# Patient Record
Sex: FEMALE | Race: WHITE | NOT HISPANIC OR LATINO | Employment: FULL TIME | URBAN - METROPOLITAN AREA
[De-identification: names, ages, dates, MRNs, and addresses within clinical notes are randomized per-mention and may not be internally consistent; named-entity substitution may affect disease eponyms.]

---

## 2017-05-31 ENCOUNTER — LAB (OUTPATIENT)
Dept: LAB | Facility: HOSPITAL | Age: 37
End: 2017-05-31

## 2017-05-31 ENCOUNTER — OFFICE VISIT (OUTPATIENT)
Dept: ONCOLOGY | Facility: CLINIC | Age: 37
End: 2017-05-31

## 2017-05-31 VITALS
BODY MASS INDEX: 19.15 KG/M2 | SYSTOLIC BLOOD PRESSURE: 106 MMHG | TEMPERATURE: 98.2 F | RESPIRATION RATE: 12 BRPM | HEIGHT: 64 IN | WEIGHT: 112.2 LBS | OXYGEN SATURATION: 100 % | HEART RATE: 96 BPM | DIASTOLIC BLOOD PRESSURE: 70 MMHG

## 2017-05-31 DIAGNOSIS — D68.61 ANTIPHOSPHOLIPID ANTIBODY SYNDROME (HCC): Primary | ICD-10-CM

## 2017-05-31 LAB
BASOPHILS # BLD AUTO: 0.03 10*3/MM3 (ref 0–0.1)
BASOPHILS NFR BLD AUTO: 0.5 % (ref 0–1.1)
DEPRECATED RDW RBC AUTO: 36.8 FL (ref 37–49)
EOSINOPHIL # BLD AUTO: 0.06 10*3/MM3 (ref 0–0.36)
EOSINOPHIL NFR BLD AUTO: 0.9 % (ref 1–5)
ERYTHROCYTE [DISTWIDTH] IN BLOOD BY AUTOMATED COUNT: 12.3 % (ref 11.7–14.5)
HCT VFR BLD AUTO: 40.4 % (ref 34–45)
HGB BLD-MCNC: 13.6 G/DL (ref 11.5–14.9)
IMM GRANULOCYTES # BLD: 0.02 10*3/MM3 (ref 0–0.03)
IMM GRANULOCYTES NFR BLD: 0.3 % (ref 0–0.5)
LYMPHOCYTES # BLD AUTO: 1.73 10*3/MM3 (ref 1–3.5)
LYMPHOCYTES NFR BLD AUTO: 26.7 % (ref 20–49)
MCH RBC QN AUTO: 27.5 PG (ref 27–33)
MCHC RBC AUTO-ENTMCNC: 33.7 G/DL (ref 32–35)
MCV RBC AUTO: 81.8 FL (ref 83–97)
MONOCYTES # BLD AUTO: 0.54 10*3/MM3 (ref 0.25–0.8)
MONOCYTES NFR BLD AUTO: 8.3 % (ref 4–12)
NEUTROPHILS # BLD AUTO: 4.09 10*3/MM3 (ref 1.5–7)
NEUTROPHILS NFR BLD AUTO: 63.3 % (ref 39–75)
NRBC BLD MANUAL-RTO: 0 /100 WBC (ref 0–0)
PLATELET # BLD AUTO: 68 10*3/MM3 (ref 150–375)
PLATELETS.RETICULATED NFR BLD AUTO: 7.3 % (ref 1.1–6.1)
PMV BLD AUTO: 10.8 FL (ref 8.9–12.1)
RBC # BLD AUTO: 4.94 10*6/MM3 (ref 3.9–5)
WBC NRBC COR # BLD: 6.47 10*3/MM3 (ref 4–10)

## 2017-05-31 PROCEDURE — 85055 RETICULATED PLATELET ASSAY: CPT

## 2017-05-31 PROCEDURE — 99213 OFFICE O/P EST LOW 20 MIN: CPT | Performed by: INTERNAL MEDICINE

## 2017-05-31 PROCEDURE — 36416 COLLJ CAPILLARY BLOOD SPEC: CPT

## 2017-05-31 PROCEDURE — 85025 COMPLETE CBC W/AUTO DIFF WBC: CPT

## 2019-08-26 ENCOUNTER — TELEPHONE (OUTPATIENT)
Dept: ONCOLOGY | Facility: HOSPITAL | Age: 39
End: 2019-08-26

## 2019-08-26 NOTE — TELEPHONE ENCOUNTER
----- Message from Terri Collins sent at 8/26/2019  8:01 AM EDT -----  [phone]   needs an order sent to her local doctor to get her platelets checked      Pt has not been seen in over 2 years. Explained to pt that she would need to have her PCP order the CBC and if there was an area of concern, we would gladly see her again but she would need to be referred back. Pt v/u.

## 2022-02-18 ENCOUNTER — APPOINTMENT (OUTPATIENT)
Dept: WOMENS IMAGING | Facility: HOSPITAL | Age: 42
End: 2022-02-18

## 2022-02-18 PROCEDURE — 77063 BREAST TOMOSYNTHESIS BI: CPT | Performed by: RADIOLOGY

## 2022-02-18 PROCEDURE — 77067 SCR MAMMO BI INCL CAD: CPT | Performed by: RADIOLOGY

## 2022-05-02 ENCOUNTER — OFFICE VISIT (OUTPATIENT)
Dept: GYNECOLOGIC ONCOLOGY | Facility: CLINIC | Age: 42
End: 2022-05-02

## 2022-05-02 ENCOUNTER — PREP FOR SURGERY (OUTPATIENT)
Dept: OTHER | Facility: HOSPITAL | Age: 42
End: 2022-05-02

## 2022-05-02 VITALS
OXYGEN SATURATION: 99 % | TEMPERATURE: 98.1 F | RESPIRATION RATE: 16 BRPM | HEIGHT: 64 IN | WEIGHT: 114.3 LBS | BODY MASS INDEX: 19.52 KG/M2 | DIASTOLIC BLOOD PRESSURE: 77 MMHG | SYSTOLIC BLOOD PRESSURE: 117 MMHG | HEART RATE: 91 BPM

## 2022-05-02 DIAGNOSIS — N83.8 OVARIAN MASS: Primary | ICD-10-CM

## 2022-05-02 DIAGNOSIS — N83.209 CYST OF OVARY, UNSPECIFIED LATERALITY: Primary | ICD-10-CM

## 2022-05-02 PROCEDURE — 99204 OFFICE O/P NEW MOD 45 MIN: CPT | Performed by: OBSTETRICS & GYNECOLOGY

## 2022-05-02 RX ORDER — CETIRIZINE HYDROCHLORIDE 10 MG/1
TABLET ORAL DAILY
COMMUNITY

## 2022-05-02 RX ORDER — MULTIPLE VITAMINS W/ MINERALS TAB 9MG-400MCG
1 TAB ORAL DAILY
COMMUNITY

## 2022-05-02 RX ORDER — SODIUM CHLORIDE 0.9 % (FLUSH) 0.9 %
10 SYRINGE (ML) INJECTION AS NEEDED
Status: CANCELLED | OUTPATIENT
Start: 2022-07-01

## 2022-05-02 RX ORDER — SODIUM CHLORIDE, SODIUM LACTATE, POTASSIUM CHLORIDE, CALCIUM CHLORIDE 600; 310; 30; 20 MG/100ML; MG/100ML; MG/100ML; MG/100ML
100 INJECTION, SOLUTION INTRAVENOUS CONTINUOUS
Status: CANCELLED | OUTPATIENT
Start: 2022-07-01

## 2022-05-02 RX ORDER — SODIUM CHLORIDE 0.9 % (FLUSH) 0.9 %
10 SYRINGE (ML) INJECTION EVERY 12 HOURS SCHEDULED
Status: CANCELLED | OUTPATIENT
Start: 2022-07-01

## 2022-05-02 RX ORDER — ONDANSETRON 2 MG/ML
4 INJECTION INTRAMUSCULAR; INTRAVENOUS EVERY 6 HOURS PRN
Status: CANCELLED | OUTPATIENT
Start: 2022-05-02

## 2022-05-02 RX ORDER — CEFAZOLIN SODIUM 2 G/100ML
2 INJECTION, SOLUTION INTRAVENOUS ONCE
Status: CANCELLED | OUTPATIENT
Start: 2022-07-01 | End: 2022-05-02

## 2022-05-02 NOTE — PROGRESS NOTES
Age: 42 y.o.  Sex: female  :  1980  MRN: [id number]       REFERRING PHYSICIAN: No ref. provider found  DATE OF VISIT: 2022        Regi Glass presents to [facility] for evaluation and management of ovrian cyst. TVUS shows uterus 10cm, ET 8mm, Right ovary 1cm, left ovary 7.5cm. She has no bloating or pelvic pain. No family history of breast or ovarian cancer.   Pt with known h/o ITP, plts usually around 60k. Sees Dr Eastman. +SLE/antiphoscpholipid Ab      Oncology/Hematology History Overview Note   Regi Glass is a 42 y.o. female referred by Dr. Olga Mello ([facility]) for left ovarian cysts.    • 22: Pap smear-negative  • 22: TVUS-uterus-97 x 60 x 72 mm, ET-8.9 mm, right ovary-28 x 15 x 16 mm, left ovary-66 x 55 x 45 mm, cyst-31 x 17 x 29 mm, cyst-46 x 40 x 43 mm.  • 22: TVUS-uterus-107 x 62 x 62 mm, ET-8.8 mm, right ovary-20 x 15 x 17 mm, left ovary-75 x 70 x 52 mm, cyst-61 x 53 x 47 mm, cyst-30 x 30 x 24 mm.         Past Medical History:  Past Medical History:   Diagnosis Date   • Cytopenia    • Endometriosis    • Erythematous rash     ON ABDOMEN, BACK AND LOWER EXTREMITIES   • Gestational thrombocytopenia (HCC)    • H/O idiopathic thrombocytopenic purpura    • Hx of folliculitis     IN THE LEFT OUTER ASPECT OF LABIA   • ITP (idiopathic thrombocytopenic purpura)     INCLUDING PLATELET CLUMPING   • Joint pain    • Thrombocytopenia (HCC)        Past Surgical History:  Past Surgical History:   Procedure Laterality Date   • DIAGNOSTIC LAPAROSCOPY EXPLORATORY LAPAROTOMY      1.Status post exploratory laparoscopy diagnosed with endometriosis in 2006.   • INCISION AND DRAINAGE ABSCESS      FOLLICULITIS IN TE LEFT OUTER ASPECT OF LABIA        MEDICATIONS:    Current Outpatient Medications:   •  cetirizine (zyrTEC) 10 MG tablet, Take  by mouth Daily., Disp: , Rfl:   •  hydroxychloroquine (PLAQUENIL) 200 MG tablet, Take  by mouth daily., Disp: , Rfl:  "  •  multivitamin with minerals (MULTIVITAMIN ADULT PO), Take 1 tablet by mouth Daily., Disp: , Rfl:     ALLERGIES:  Allergies   Allergen Reactions   • Bactrim [Sulfamethoxazole-Trimethoprim]          ROS:  CONSTITUTIONAL:  Denies fever or chills.   NEUROLOGIC:  Denies headache, focal weakness or sensory changes.   EYES:  Denies change in visual acuity.  HEENT:  Denies nasal congestion or sore throat.   RESPIRATORY:  Denies cough or shortness of breath.   CARDIOVASCULAR:  Denies chest pain or edema.   GI:  Denies abdominal pain, nausea, vomiting, bloody stools or diarrhea.   :  Denies dysuria, leaking or incontinence.  MUSCULOSKELETAL:  Denies back pain or joint pain.   INTEGUMENT:  Denies rash.   ENDOCRINE:  Denies polyuria or polydipsia.   LYMPHATIC:  Denies swollen glands or lymphedema.   PSYCHIATRIC:  Denies depression or anxiety.      PHYSICAL EXAM:  Vitals:    05/02/22 1118   BP: 117/77   Pulse: 91   Resp: 16   Temp: 98.1 °F (36.7 °C)   TempSrc: Oral   SpO2: 99%   Weight: 51.8 kg (114 lb 4.8 oz)   Height: 162 cm (63.78\")  Comment: new patient height   PainSc: 0-No pain     Body mass index is 19.76 kg/m².  Current Status 5/2/2022   ECOG score 0     PHQ-9 Total Score:         GEN: alert and oriented x 3, normal affect, well nourished and hydrated.  CARDIO: regular rate and rhythm.  PULM: Lungs CTA b.l, no RRW   ABD: Soft, nontender, nondistended.   GYN: External genitalia normal, no lesions. Speculum with normal vagina, normal appearingcervix without lesions. Bimanual exam does not reveal any palpable lesions. +Left adnexal fullness  EXT: No petechiae, bruising, rash, candida. No CCE.       Result Review :  The pertinent labs, images, and/or pathology as noted in the oncology history were reviewed independently and discussed with the patient.   Aurora Craven DO   05/02/2022    [facility] [place] [facility]   WBC   Date Value Ref Range Status   05/31/2017 6.47 4.00 - 10.00 10*3/mm3 Final     RBC   Date Value Ref Range Status "   05/31/2017 4.94 3.90 - 5.00 10*6/mm3 Final     Hemoglobin   Date Value Ref Range Status   05/31/2017 13.6 11.5 - 14.9 g/dL Final     Hematocrit   Date Value Ref Range Status   05/31/2017 40.4 34.0 - 45.0 % Final     Platelets   Date Value Ref Range Status   05/31/2017 68 (L) 150 - 375 10*3/mm3 Final     No results found for:     [facility]:  No radiology results for the last 90 days.          ASSESSMENT :  • Left ovarian cyst 7.5cm   • Thrombocytopenia (ITP)  • SLE / antiphospholipid Ab          PLAN :  Perioperative mgmt: platelets available in pre op   Med onc clearance - improve platelet counts as much as possible before surgery     The case was reviewed with the patient in detail, taking into consideration symptoms, laboratory results, imaging, pathology and physical exam findings.  At this point in time, I am recommending Exam under anesthesia, Robotic assisted total laparoscopic hysterectomy, bilateral salpingectomy, unilateral oophorecotmy, possible staging, possible laparotomy.       Risks, benefits, alternatives and indications to the procedure were reviewed in detail.    Risks of surgery include bleeding/hemorrhage which may require transfusion and associated transfusion risk (transfusion reaction, HCV, TRALI ); Infection, which may require antibiotic therapy or abscess drainage; Damage to surrounding internal organs (bowel, bladder, or urinary tract) which may result in obstruction or fistula; Nerve, or vascular injury which may result in numbness, pain, swelling or loss of strength. Risk of possible incomplete resolution of symptoms or failure to cure.  She understands that it is possible that intraoperative findings may warrant further treatment.  There is a low, but real, risk of unanticipated return to the OR for a problem associated with the surgery and a remote possibility of death.      • All questions were addressed and answered to the patient's satisfaction. She indicates understanding  of these risks and desires to proceed. She wishes me to use my judgement to do the procedure that I feel is in her best interest. Surgical consents were signed.  •             Aurora Craven D.O  5/2/2022    Gynecologic Oncology   [address] [place] [address] [address] [place] [id number] [phone] office

## 2022-05-05 ENCOUNTER — PATIENT ROUNDING (BHMG ONLY) (OUTPATIENT)
Dept: GYNECOLOGIC ONCOLOGY | Facility: CLINIC | Age: 42
End: 2022-05-05

## 2022-05-05 DIAGNOSIS — D69.6 TEMPORARY LOW PLATELET COUNT: Primary | ICD-10-CM

## 2022-05-05 NOTE — PROGRESS NOTES
May 5, 2022    Hello, may I speak with Regi Glass?    My name is TATA HERNANDEZ     I am  with [facility]  [address] [address] [phone].    Before we get started may I verify your date of birth? 1980    I am calling to officially welcome you to our practice and ask about your recent visit. Is this a good time to talk? YES    Tell me about your visit with us. What things went well?  CHECK IN WAS GREAT. THEY WERE SO NICE. EVERYONE VERY WELCOMING. ALL MY QUESTIONS WERE ANSWERED. VERY INFORMATIVE. I REALLY LIKE THE PT EDUCATION BINDER.        We're always looking for ways to make our patients' experiences even better. Do you have recommendations on ways we may improve?  NO    Overall were you satisfied with your first visit to our practice? YES       I appreciate you taking the time to speak with me today. Is there anything else I can do for you?  NO      Thank you, and have a great day.

## 2022-05-31 ENCOUNTER — CONSULT (OUTPATIENT)
Dept: ONCOLOGY | Facility: CLINIC | Age: 42
End: 2022-05-31

## 2022-05-31 ENCOUNTER — LAB (OUTPATIENT)
Dept: OTHER | Facility: HOSPITAL | Age: 42
End: 2022-05-31

## 2022-05-31 VITALS
OXYGEN SATURATION: 99 % | SYSTOLIC BLOOD PRESSURE: 122 MMHG | BODY MASS INDEX: 19.72 KG/M2 | HEART RATE: 86 BPM | RESPIRATION RATE: 16 BRPM | HEIGHT: 64 IN | DIASTOLIC BLOOD PRESSURE: 84 MMHG | TEMPERATURE: 97.1 F | WEIGHT: 115.5 LBS

## 2022-05-31 DIAGNOSIS — D69.6 TEMPORARY LOW PLATELET COUNT: Primary | ICD-10-CM

## 2022-05-31 DIAGNOSIS — D68.61 ANTIPHOSPHOLIPID ANTIBODY SYNDROME: Primary | ICD-10-CM

## 2022-05-31 DIAGNOSIS — N83.8 OVARIAN MASS: ICD-10-CM

## 2022-05-31 LAB
BASOPHILS # BLD AUTO: 0.02 10*3/MM3 (ref 0–0.2)
BASOPHILS NFR BLD AUTO: 0.2 % (ref 0–1.5)
CANCER AG125 SERPL QL: 90.1 U/ML (ref 0–38.1)
DEPRECATED RDW RBC AUTO: 38.5 FL (ref 37–54)
EOSINOPHIL # BLD AUTO: 0.06 10*3/MM3 (ref 0–0.4)
EOSINOPHIL NFR BLD AUTO: 0.7 % (ref 0.3–6.2)
ERYTHROCYTE [DISTWIDTH] IN BLOOD BY AUTOMATED COUNT: 12.2 % (ref 12.3–15.4)
HCT VFR BLD AUTO: 43.2 % (ref 34–46.6)
HGB BLD-MCNC: 13.9 G/DL (ref 12–15.9)
IMM GRANULOCYTES # BLD AUTO: 0.03 10*3/MM3 (ref 0–0.05)
IMM GRANULOCYTES NFR BLD AUTO: 0.4 % (ref 0–0.5)
LYMPHOCYTES # BLD AUTO: 1.74 10*3/MM3 (ref 0.7–3.1)
LYMPHOCYTES NFR BLD AUTO: 21.3 % (ref 19.6–45.3)
MCH RBC QN AUTO: 27.7 PG (ref 26.6–33)
MCHC RBC AUTO-ENTMCNC: 32.2 G/DL (ref 31.5–35.7)
MCV RBC AUTO: 86.1 FL (ref 79–97)
MONOCYTES # BLD AUTO: 0.8 10*3/MM3 (ref 0.1–0.9)
MONOCYTES NFR BLD AUTO: 9.8 % (ref 5–12)
NEUTROPHILS NFR BLD AUTO: 5.5 10*3/MM3 (ref 1.7–7)
NEUTROPHILS NFR BLD AUTO: 67.6 % (ref 42.7–76)
NRBC BLD AUTO-RTO: 0 /100 WBC (ref 0–0.2)
PLATELET # BLD AUTO: 65 10*3/MM3 (ref 140–450)
PLATELET # BLD AUTO: 65 10*3/MM3 (ref 140–450)
PLATELETS.RETICULATED NFR BLD AUTO: 7.3 % (ref 0.9–6.5)
PMV BLD AUTO: 10.6 FL (ref 6–12)
RBC # BLD AUTO: 5.02 10*6/MM3 (ref 3.77–5.28)
WBC NRBC COR # BLD: 8.15 10*3/MM3 (ref 3.4–10.8)

## 2022-05-31 PROCEDURE — 99215 OFFICE O/P EST HI 40 MIN: CPT | Performed by: INTERNAL MEDICINE

## 2022-05-31 PROCEDURE — 85613 RUSSELL VIPER VENOM DILUTED: CPT | Performed by: INTERNAL MEDICINE

## 2022-05-31 PROCEDURE — 86147 CARDIOLIPIN ANTIBODY EA IG: CPT | Performed by: INTERNAL MEDICINE

## 2022-05-31 PROCEDURE — 86304 IMMUNOASSAY TUMOR CA 125: CPT | Performed by: INTERNAL MEDICINE

## 2022-05-31 PROCEDURE — 36415 COLL VENOUS BLD VENIPUNCTURE: CPT

## 2022-05-31 PROCEDURE — 85055 RETICULATED PLATELET ASSAY: CPT | Performed by: INTERNAL MEDICINE

## 2022-05-31 PROCEDURE — 85025 COMPLETE CBC W/AUTO DIFF WBC: CPT | Performed by: INTERNAL MEDICINE

## 2022-05-31 PROCEDURE — 85670 THROMBIN TIME PLASMA: CPT | Performed by: INTERNAL MEDICINE

## 2022-05-31 PROCEDURE — 85705 THROMBOPLASTIN INHIBITION: CPT | Performed by: INTERNAL MEDICINE

## 2022-05-31 PROCEDURE — 86146 BETA-2 GLYCOPROTEIN ANTIBODY: CPT | Performed by: INTERNAL MEDICINE

## 2022-05-31 PROCEDURE — 85732 THROMBOPLASTIN TIME PARTIAL: CPT | Performed by: INTERNAL MEDICINE

## 2022-05-31 RX ORDER — PREDNISONE 20 MG/1
20 TABLET ORAL
Qty: 60 TABLET | Refills: 1 | Status: SHIPPED | OUTPATIENT
Start: 2022-05-31 | End: 2022-07-02 | Stop reason: HOSPADM

## 2022-05-31 NOTE — PROGRESS NOTES
Subjective .  Discussed previous history, current status                                                                          REASONS FOR FOLLOWUP:    1. Idiopathic thrombocytopenic purpura.   2. Variable thrombocytopenia including platelet clumping. ?Gestational thrombocytopenia noted when reviewed 01/16/2012 and 02/14/2012.   3. Patient seen 01/16/2012 when 16 weeks pregnant.   4. Patient seen 05/01/2012 nearing term. Platelet count approximately 80,000.   5. Patient seen 01/24/2014, further thrombocytopenia, now associated with increasing joint pain-polyarticular joint garo n, rheumatologic assessment initiated, consult requested, and prednisone initiated 20 mg oral daily.   6. Patient status post rheumatologic assessment and results pending, platelet count responsive to oral steroids, reduced; steroid taper planned.   7. Patient seen on 02/21/2014, platelet count approximately 70,000 on 5 mg every other day per prednisone. Rheumatologic assessment ongoing. SLA(?). Mild increase in antiphospholipid antibody levels.   8. The patient seen on 06/13/2014, Plaquenil initiated for apparel sy stemic lupus erythematosus associated elevated antiphospholipid antibody, further thrombocytopenia noted in our office and to restart steroids over a 2 week period.   9. The patient seen 07/14/2014 - improvement per generalized rheumatologic symptoms from cyto penia persisting, Plaquenil continued. Recheck over the subsequent 2 months planned.   10. Patient seen 11/10/2014, platelet count approximately 50,000, pending stabilization of rheumatologic symptoms. Reassessment in 4 months planned.   11. Patient was seen on 03 /23/2015, platelet count between 40,000 and 50,000, again with stabilization of rheumatologic symptoms, reassessment in 6 months' time.   12. Patient seen 09/16/2015, platelet count of 92,000, improvement of rheumatologic symptoms, every 6 month assessments pl anned.   13. Patient seen June 01, 2016 stable,  platelet count 1 or 16,000 additional rheumatologic symptoms controlled?  Length of Imuran use  14. Patient reviewed , off Imuran for approximate 6 months, anticardiolipin antibodies, lupus anticoagulant, beta-2 glycoprotein antibodies rechecked  15. Patient reviewed May 31, 2017 clinically stable, platelet count acceptable, yearly follow-up planned  16. Patient reassessed 22 with bilateral ovarian cysts, plans for surgical intervention per gynecologic oncology, reassessment per antiphospholipid antibody syndrome, steroids initiated with prednisone 1 mg/kg        History of Present Illness          The patient is now a 42-year-old female with a history of ITP and variable thrombocytopenia also associated with platelet clumping and gestational thrombocytopenia. The patient was seen during her pregnancy 2012,  and again 2012 at approximately 27 weeks, platelet count 70,000-80,000. She was admitted to [facility] with a platelet count of 1000 along with red purpura and petechiae. She has a history of thrombocytopenia dating to  when she was pregnant with a platelet count in the 50,000 range. She was also treated throughout her pregnancy with Lovenox at prophylactic doses due to prior miscarriages. She tolerated this well with no bleeding complications, platelet count appar ently spontaneously improved to the 80,000 range, eventually was 95,000 at the time of .   Approximately 10 years prior to hospitalization here at [facility], she had an area of folliculitis in the left outer aspect of labia requi ring incision and drainage treated empirically with antibiotics with Bactrim and doxycycline which she received over 10 days.   On the morning of admission she noted development of diffuse erythematous rash on her abdomen and back as well as lower extremities and buccal mucosa. She presented to Dr. Lee in [place] and was found to  have a white count of 5000, hemoglobin 14.2 a n d platelet count of 1000. She was transferred to [facility] for admission and treatment for presumed ITP. Admitting studies include a CRP of .6, ANTONI 1:80 positive, , IPF 31.8, initial CBC with platelet count of 2000, H and H 13.9 , 40.7, WBC 4140. She was placed on Decadron orally and IVIG. Sedimentation rate was found to be 8 and on the 24th IPF was still 31.7. AFO screening was also positive incidentally and CMV antibody IgG was greater than 13.2, IgM .28, EBV antibodies were a lso considerably high, EBV antibody/nuclear antigen greater than 600 and EBV antibodies ECA IgG of 450 with an EBV antibody early IgG of 17. All of these are excessively high. The patient did fortunately respond however, by the 26th platelet count was u p to 84,000, IPF 6.7. It was elected at this point for the patient to be discharged home and followed back in the office with weekly counts and tapered steroids thereafter.   She had her counts done each week including 12/13/2010 at which time she was 201 ,000 and when seen on 12/20/2010, platelet count was 136,000. In reviewing her circumstances on that day, she went up to 30 mg of steroids and had her cut back very slowly on a weekly basis.   She returned on 01/17/2011, feeling well. Her IPF is also at normal levels today. Additional studies have been done to be certain that she does have additional hypercoagulable state as well including lupus anticoagulant including lipid antibody screening. She has had both Pneumovax and meningococcal vaccine but no Haemophilus.   The patient presented back to the office 02/14/2011 after tapering off steroids and has been off of them for 1 week. Fortunately she remained relatively stable with platelet count 116,000 and we elected to follow her on an every other we ek basis. Fortunately her subsequent counts have been stable. She has been able to complete her vaccinations as  necessary. Her subsequent testing here has shown platelets in the 205,000 range on 03/28/2011, 168,000 on 04/11/2011 and 148 on 04/18/2011. She continues to feel well otherwise and has agreed that she would have monthly checks for a period of time longer for at least a 6 month period from her hospitalization.   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 She may have had a viral illness as of late. Her counts checked 05/17/2011 with a platelet count of 198,000, 06/13/2011 of 138,000. As she returns it i s clear that her platelet count is actually lower into the near 90,000 range. We discussed this in part as possibly not necessarily related to ICP since her IPF is actually quite low and her smears do not show enlarged platelets.   As a result of the ab ove the patient was asked to have counts done closer to home and these were done at every two week intervals with a considerable variation seen. This includes on 08/03 of 146,000, 08/10 of 168,000, 08/17 of 113,000, 8/24 of 96,000 and today 106,000 here in the office. As a result of these findings in its variability thereof suggests that perhaps the patient has platelet clumping ongoing, and today we will test her for this as well.   The patient thereafter continued her usual lifestyle. She and her [facility] nd had made plans for her to try to become pregnant and she did quite quickly do so. Thereafter she now sees Dr. Vic Lock, high-risk obstetrics, and her platelet count checked there approximately every other week has been dropping. She is now seen back in our office and actually has a platelet count of 56,000 with an IPF of 7. A peripheral smear is currently pending.   As result of review 01/16/2012 the patient had multifactorial reasons for her thrombocytopenia including ITP, likely additional pl atelet clumping, and?  gestational thrombocytopenia. In any case, she was reasonably stable at that point and we elected to follow her counts every other week. She returns back today having done this. She was also placed on aspirin by her high-risk OB a n d she is having increasing gum bleeding likely as a result of several reasons now. Her most recent platelet counts including 84,000 on 2012 and 68,000 when seen today, 2012. IPF interestingly is 4.7. Her pregnancy has otherwise gone well wi th no additional issues thus far. She is approximately 20 weeks' gestation.   The patient was asked to be checked at her family physicians office. She also followed up with her high risk OB and has platelet count ranging from 60,000 to as much as 80,000. Today when seen her platelet count is 79,000, IPF of 6.5. I discussed with Dr. Tomi Lock her high risk OB and plans were to try to hold off steroids unless she is below 50,000 wherein she would also discontinue aspirin. When seen today the pregn leanne has progressed nicely without any complication thus far. It is not clear if her delivery date is to be scheduled, but one expects that it might well be.   The patient's case was discussed with Dr. Tomi Lock with plans for her to again hold off steroids until she is below 50,000 at which time she will discontinue aspirin.   When seen again on 2012, pregnancy has progressed nicely and she is to see Dr. Tomi Lock in approximately 2 weeks. It is likely that she will undergo .   The patient did proceed onto steroids just prior to her pregnancy and fortunately did quite well peripartum. The patient had not been seen since that time approximately a year when she is now seen back 2013. She had presented to her PCP' s office jus t recently with abdominal pain, slowly worsening without nausea, vomiting or change in bowel habits. She underwent CT scan of the abdomen, pelvis without abnormalities noted though her blood  count was somewhat suspicious with an elevated white count to 18 , 000 and platelet count down to 33,000. Normal hemoglobin and hematocrit 14.3 and 41.97. Patient rechecked two days later with platelet count of 43,000 and white count apparently normal? She now presents back to our practice for reassessment feeling much b tereza per her abdominal pain with a normal performance status, otherwise no change in bowel function.   The patient thereafter was asked to be seen back and now presents 01/24/2014. She has been noticing in the last several weeks to months, pain in multiple joints including right hip, left shoulder and the bilateral knees. She notes a general stiffness throughout multiple joints in the a.m. as well. This is a new finding for her and she is quite troubled by it. She was undergoing assessment for it recently with additional laboratory studies done including 10/10/2013 with a platelet count found to be reduced to 86,000.   Additional laboratory results done also in [place] in June 2013 include normal serum chemistries; platelet count now is 53,000, WBC 8700, he moglobin and hematocrit 14.6, and 43.6. ANTONI titer positive, RA rheumatoid factor of 7, ASO of 137, uric acid 3.7. The patient was thereafter referred back to our practice for her thrombocytopenia. She feels well today except for again a degree of joint d iscomfort in the distribution as described above.   The patient as a result of the above and suspicions for rheumatologic disease was asked to undergo a series of studies. This included an ANTONI comprehensive panel that revealed anti-double-stranded DNA elevated at 21. Additionally, her RA panel suggested e a rly rheumatoid arthritis with a positive anti-CCP. As the patient's studies with Dr. Gonzáles however were not yet completed at the time she is seen back in our office. The patient at this point had continued steroids though when seen back in the office 01/3 1 /2014 her platelet count was  199,000. She was asked to taper prednisone down to 10 mg daily and when seen 02/07/2014 her platelet count is 89,000. The patient feels well overall today when seen. The joint discomfort has improved remarkably on the steroi d doses.   Patient seen on 02/21/2014 platelet count approximately 70,000 on 5 mg every other day per prednisone. Rheumatologic assessment ongoing mild increase in antiphospholipid antibody level.   The patient, after last being seen, was asked to drop predn isone down to 5 mg daily and then 5 mg every other day. She is now seeing Dr. Minoo Gonzáles who is assessing her as well and we sent additional laboratory studies, demonstrating mild elevation in antiphospholipid antibodies as well as glycoprotein antibody . It was also noted that her TTP level was up to 63. Ms. Glass indicates that she is being considered for potentially lupus as an underlying diagnosis. Dr. Gonzáles would like to check her off steroids and thus we discussed tapering off further at this po int with platelet count is now reasonably acceptable.   The patient thereafter came off steroids and has been doing relatively well. As she now however, presents back it is recognized that her platelet count has been slowly dropping. This includes the 05/27 /2014 assessment with a platelet count of 30,000 with assessment on 06/11/2014 at 25,000 and today when seen on 06/13/2014 the platelet count is 20,000 with IPF 14.5. The patient states she is having increasing joint pain but has had no evidence of ecchym oses or petechial lesions and no additional gingival bleeding, epistaxis or vaginal bleeding. She in fact feels reasonably good except for the joint pain that she feels is manageable. She is, however, concerned about her platelet count and rightly so.   The patient thereafter was started on Plaquenil through Rheumatology and over the next several weeks, she had felt somewhat better. In fact when she is seen back today, 07/14/2014, she  no longer has joint discomfort. She does have thrombocytopenia, however, a t approximately the same levels as she did previously. She had been on steroids in the last several weeks as they were tapered off. This was revealed when her platelet count was 23,000 three weeks ago and is recognized today. Today, however, her platelet count is 28,000. She has had no additional bleeding issues however, since last seen.   The patient, after being reviewed in July 2014, was asked to followup with plans for her to remain on Plaquenil, initiate Imuran which began in August. She stayed on th e medication, fortunately tolerating it well and as she was seen back today, 11/17/2014, overall feels as if she is having fewer rheumatologic symptoms. Unfortunately, hematologically, she remains [id number] with her platelet count at close to 60, 000.   The patient thereafter has followed up with rheumatology on an every 2 month basis and now returned back here 4 months from previous on 03/23/2015. Her symptoms for her rheumatologic disorder remains under control though her Imuran has been moved to 150 mg a day as well as her Plaquenil, maintained at the same dose. She feels reasonably good overall today without any new symptoms, but concerned about her platelet count that was close to 40,000 when reviewed today. The patient has had no bleeding in g ums, epistaxis, GI or  tract.   The patient is seen back in followup and indicates that her rheumatologic symptoms remain under control with Imuran and Plaquenil. We find wonderfully enough that her platelet count is also improved to approximately 100,000 when seen in the office today as well.   Patient is now next reviewed June 01, 2016.  She is feeling well overall without additional rheumatologic symptoms.  She has been on Imuran for approximately 2 years and is a question of how long she'll need to stay on it.  She fortunately is hematologically stable when reviewed today platelet  count of 116,000, and otherwise normal CBC     The patient is now reviewed December 50,016.  As a result of running out of her Imuran not having it refilled by a rheumatologist she has remained off Imuran.  She, fortunately, has not had any additional symptoms including rash development and/or joint discomfort.  We discussed rechecking her anticardiolipin antibodies, beta-2 glycoprotein antibodies and lupus anticoagulant at this point since she has a further degree of thrombocytopenia also present today.    The patient's subsequent laboratory studies were otherwise negative and she is now seen back May 31, 2017.  She continues to feel well without any joint disturbance but she does note bilateral malar rash which she tends to cover with makeup.          The patient presented February 18, 2022 to [facility] for preventive examination at this point having fairly heavy periods-?  Ablation or hysterectomy.Subsequent CBC including H&H of 13.3 and 41.4 white count of 9200, MCV 82.8, MCH of 26.6 and platelet count of 68,000, normal automated differential. Subsequent pelvic ultrasound revealed homogenous appearance to endometrium per uterus, endometrial borders well-defined, no intracavitary mass, normal endometrial thickness, normal cervix, right ovary normal, left ovary of 2 cysts 1 at just over 60 mm and possible endometrioma and 1 at just over 30 mm also possible endometrioma.  Unfortunately by late April 2022 her degree of menorrhagia was worsening and she was referred to Dr. Craven who felt she should undergo a laparoscopic hysterectomy, bilateral salpingo-oophorectomy unilateral oophorectomy and possible staging possible laparotomy.  She is now referred back to try to improve her platelet count prior to any surgical procedure.     The patient is seen 5/31/2022 indicating that she has been routinely assessed by rheumatology at every 6 months while she is remained on Plaquenil undergoing laboratory studies and  ophthalmologic assessments every 6 months.  She has had no issues until recently indicating her platelet count has been between 65 and 80,000.  She understands the above issues and the need to try to bring her platelet count up prior to surgical intervention by Dr. Craven.  She has had no symptoms, however, of progressive rheumatologic disease including lack of Raynaud's, further malar rash, joint discomfort, shortness of breath, cough, abdominal pain, change in bowel habit, joint swelling or neuropathy.  Additionally she has had no pelvic discomfort.        Past Medical History:   Diagnosis Date   • Cytopenia    • Endometriosis 2006   • Erythematous rash     ON ABDOMEN, BACK AND LOWER EXTREMITIES   • Gestational thrombocytopenia (HCC)    • H/O idiopathic thrombocytopenic purpura    • Hx of folliculitis     IN THE LEFT OUTER ASPECT OF LABIA   • ITP (idiopathic thrombocytopenic purpura)     INCLUDING PLATELET CLUMPING   • Joint pain    • Thrombocytopenia (HCC)        ONCOLOGIC HISTORY:  (History from previous dates can be found in the separate document.)    Current Outpatient Medications on File Prior to Visit   Medication Sig Dispense Refill   • cetirizine (zyrTEC) 10 MG tablet Take  by mouth Daily.     • hydroxychloroquine (PLAQUENIL) 200 MG tablet Take  by mouth daily.     • multivitamin with minerals tablet tablet Take 1 tablet by mouth Daily.       No current facility-administered medications on file prior to visit.       ALLERGIES:     Allergies   Allergen Reactions   • Bactrim [Sulfamethoxazole-Trimethoprim]        Social History     Socioeconomic History   • Marital status:    Tobacco Use   • Smoking status: Never Smoker   Substance and Sexual Activity   • Alcohol use: No   • Drug use: No         Cancer-related family history is negative for Cancer.     Review of Systems  A comprehensive 14 point review of systems was performed and was negative except as mentioned.    Objective      Vitals:     "05/31/22 0951   BP: 122/84   Pulse: 86   Resp: 16   Temp: 97.1 °F (36.2 °C)   TempSrc: Temporal   SpO2: 99%   Weight: 52.4 kg (115 lb 8 oz)   Height: 162 cm (63.78\")   PainSc: 0-No pain     Current Status 5/2/2022   ECOG score 0       Physical Exam    GENERAL: Well-developed, well-nourished female in no acute distress.   SKIN: Warm, dry without rashes, purpura or petechiae. Patient has a macular, erythematous rash noted ridge nasally   HEAD: Normocephalic.   EYES: Pupils equal, round and reactive to light. EOMs intact. Conjunctivae normal.   EARS: Hearing intact.   NOSE: Septum midline. No excoriations or nasal discharge.   MOUTH: Tongue is well-papillated; no stomatitis or ulcers. Lips normal.   THROAT: Oropharynx without lesions or exudates.   NECK: Supple with good range of motion; no thyromegaly or masses, no JVD or bruits.   LYMPHATICS: No cervical, supraclavicular, axillary or inguinal adenopathy.   CHEST: Lungs clear to percussion and auscultation.   CARDIAC: Regular rate and rhythm without murmurs, rubs or gallops.   ABDOMEN: Soft, nontender with no organomegaly or masses.   EXTREMITIES: No clubbing, cyanosis or edema.   NEUROLOGICAL: No focal neurological deficits.   RECENT LABS:  Hematology WBC   Date Value Ref Range Status   05/31/2022 8.15 3.40 - 10.80 10*3/mm3 Final     RBC   Date Value Ref Range Status   05/31/2022 5.02 3.77 - 5.28 10*6/mm3 Final     Hemoglobin   Date Value Ref Range Status   05/31/2022 13.9 12.0 - 15.9 g/dL Final     Hematocrit   Date Value Ref Range Status   05/31/2022 43.2 34.0 - 46.6 % Final     Platelets   Date Value Ref Range Status   05/31/2022 65 (L) 140 - 450 10*3/mm3 Final   05/31/2022 65 (L) 140 - 450 10*3/mm3 Final        Assessment & Plan        42-year-old female with a history of idiopathic thrombocytopenic purpura during previous pregnancy. In addition, she has had a degree of pseudothrombocytopenia noted on periodic blood smear and likely a component of gestational " thromboc y topenia previously. She had more recently a relapse of ITP, improved on steroid dosing. Her symptoms suggested she be tested further for rheumatologic disorder. She was seen by Dr. Gonzáles who diagnosed systemic lupus erythematosus, which evidently includes a component of antiphospholipid antibody syndrome as well. The patient was placed on Plaquenil and later Imuran, to which she continues to respond. Her platelet count had been noted to improved somewhat when she was seen in June of this year.  Now which is reviewed in December she continues to do well clinically though her platelet count has dropped modestly with a very minimally elevated IPF.  It was suggested rechecking her anticardiolipin antibodies, beta-2 glycoprotein antibodies and lupus anticoagulant today while she remained off Imuran.  These studies were negative.    She is now seen May 31 again doing relatively well with acceptable platelet count and no additional clinical findings except her malar rash which she states is stable also.  We'll plan to see her now at yearly follow-ups and she'll keep appointments already scheduled with rheumatology.      The patient is seen 5/31/2022 indicating that she has been routinely assessed by rheumatology at every 6 months while she is remained on Plaquenil undergoing laboratory studies and ophthalmologic assessments every 6 months.  She has had no issues until recently indicating her platelet count has been between 65 and 80,000.       The patient presented February 18, 2022 to [facility] for preventive examination at this point having fairly heavy periods-?  Ablation or hysterectomy.Subsequent CBC including H&H of 13.3 and 41.4 white count of 9200, MCV 82.8, MCH of 26.6 and platelet count of 68,000, normal automated differential. Subsequent pelvic ultrasound revealed homogenous appearance to endometrium per uterus, endometrial borders well-defined, no intracavitary mass, normal endometrial thickness,  normal cervix, right ovary normal, left ovary of 2 cysts 1 at just over 60 mm and possible endometrioma and 1 at just over 30 mm also possible endometrioma.  Unfortunately by late April 2022 her degree of menorrhagia was worsening and she was referred to Dr. Craven who felt she should undergo a laparoscopic hysterectomy, bilateral salpingo-oophorectomy unilateral oophorectomy and possible staging possible laparotomy.  She is now referred back to try to improve her platelet count prior to any surgical procedure.        She has had no symptoms, however, of progressive rheumatologic disease including lack of Raynaud's, further malar rash, joint discomfort, shortness of breath, cough, abdominal pain, change in bowel habit, joint swelling or neuropathy.  Additionally she has had no pelvic discomfort.    Plan:  *Return to laboratory today for anticardiolipin antibodies, beta-2 glycoprotein antibodies, lupus anticoagulant testing,  level    *Start prednisone 20 mg p.o. 3 times daily for 40 mg p.o. every morning, 20 mg p.o. every afternoon at breakfast and lunch respectively    *1 week MD follow-up with CBC and IPF.      I spent 45 minutes caring for Regi on this date of service. This time includes time spent by me in the following activities: preparing for the visit, reviewing tests, obtaining and/or reviewing a separately obtained history, performing a medically appropriate examination and/or evaluation, counseling and educating the patient/family/caregiver, ordering medications, tests, or procedures, referring and communicating with other health care professionals, documenting information in the medical record, independently interpreting results and communicating that information with the patient/family/caregiver and care coordination.

## 2022-06-01 ENCOUNTER — TELEPHONE (OUTPATIENT)
Dept: ONCOLOGY | Facility: CLINIC | Age: 42
End: 2022-06-01

## 2022-06-01 LAB
CARDIOLIPIN IGG SER IA-ACNC: <9 GPL U/ML (ref 0–14)
CARDIOLIPIN IGM SER IA-ACNC: <9 MPL U/ML (ref 0–12)

## 2022-06-01 NOTE — TELEPHONE ENCOUNTER
Caller: ISAIAH    Relationship to patient: SELF    Best call back number: [phone]    Patient is needing: TO REQUEST CALL BACK FROM RN OR MD ABOUT CANCER ANTIGEN TEST RESULTS.

## 2022-06-01 NOTE — TELEPHONE ENCOUNTER
Reviewed Ca 125 with Dr. Eastman. Per Dr. Eastman, pt's elevated tumor marker indicates that she needs to have her cysts removed. He says the cysts themselves can elevate this marker. We are working to get her platelets elevated so that she can have the procedure. She will return next week for recheck. Informed pt of this and she v/u.

## 2022-06-02 DIAGNOSIS — D68.61 ANTIPHOSPHOLIPID ANTIBODY SYNDROME: Primary | ICD-10-CM

## 2022-06-02 LAB
APTT SCREEN TO CONFIRM RATIO: 1.04 RATIO (ref 0–1.34)
B2 GLYCOPROT1 IGA SER-ACNC: 12 GPI IGA UNITS (ref 0–25)
B2 GLYCOPROT1 IGG SER-ACNC: <9 GPI IGG UNITS (ref 0–20)
B2 GLYCOPROT1 IGM SER-ACNC: <9 GPI IGM UNITS (ref 0–32)
CONFIRM APTT/NORMAL: 37.8 SEC (ref 0–47.6)
LA 2 SCREEN W REFLEX-IMP: NORMAL
SCREEN APTT: 31 SEC (ref 0–51.9)
SCREEN DRVVT: 36.1 SEC (ref 0–47)
THROMBIN TIME: 18.2 SEC (ref 0–23)

## 2022-06-02 NOTE — PROGRESS NOTES
Subjective .      REASONS FOR FOLLOWUP:    1. Idiopathic thrombocytopenic purpura.   2. Variable thrombocytopenia including platelet clumping. ?Gestational thrombocytopenia noted when reviewed 01/16/2012 and 02/14/2012.   3. Patient seen 01/16/2012 when 16 weeks pregnant.   4. Patient seen 05/01/2012 nearing term. Platelet count approximately 80,000.   5. Patient seen 01/24/2014, further thrombocytopenia, now associated with increasing joint pain-polyarticular joint garo n, rheumatologic assessment initiated, consult requested, and prednisone initiated 20 mg oral daily.   6. Patient status post rheumatologic assessment and results pending, platelet count responsive to oral steroids, reduced; steroid taper planned.   7. Patient seen on 02/21/2014, platelet count approximately 70,000 on 5 mg every other day per prednisone. Rheumatologic assessment ongoing. SLA(?). Mild increase in antiphospholipid antibody levels.   8. The patient seen on 06/13/2014, Plaquenil initiated for apparel sy stemic lupus erythematosus associated elevated antiphospholipid antibody, further thrombocytopenia noted in our office and to restart steroids over a 2 week period.   9. The patient seen 07/14/2014 - improvement per generalized rheumatologic symptoms from cyto penia persisting, Plaquenil continued. Recheck over the subsequent 2 months planned.   10. Patient seen 11/10/2014, platelet count approximately 50,000, pending stabilization of rheumatologic symptoms. Reassessment in 4 months planned.   11. Patient was seen on 03 /23/2015, platelet count between 40,000 and 50,000, again with stabilization of rheumatologic symptoms, reassessment in 6 months' time.   12. Patient seen 09/16/2015, platelet count of 92,000, improvement of rheumatologic symptoms, every 6 month assessments pl anned.   13. Patient seen June 01, 2016 stable, platelet count 1 or 16,000 additional rheumatologic symptoms controlled?  Length of Imuran use  14. Patient  reviewed , off Imuran for approximate 6 months, anticardiolipin antibodies, lupus anticoagulant, beta-2 glycoprotein antibodies rechecked  15. Patient reviewed May 31, 2017 clinically stable, platelet count acceptable, yearly follow-up planned  16. Patient reassessed 22 with bilateral ovarian cysts, plans for surgical intervention per gynecologic oncology, reassessment per antiphospholipid antibody syndrome, steroids initiated with prednisone 1 mg/kg        History of Present Illness     The patient returns today for follow-up.  She continues on prednisone 60 mg daily.  She is tolerating prednisone well aside from occasional heartburn.  Heartburn typically occurs with meals, which is also when she has been taking her prednisone.  She currently does not take anything for heartburn.  She also experiences occasional flushing, maybe once daily.  She is eating and drinking adequately, her weight remains stable.  Her bowels are moving regularly.  She denies insomnia.  She denies fever or chills.  She denies signs or symptoms of bleeding.  She denies nausea or vomiting.    HEMATOLOGY HISTORY:  The patient is now a 42-year-old female with a history of ITP and variable thrombocytopenia also associated with platelet clumping and gestational thrombocytopenia. The patient was seen during her pregnancy 2012,  and again 2012 at approximately 27 weeks, platelet count 70,000-80,000. She was admitted to [facility] with a platelet count of 1000 along with red purpura and petechiae. She has a history of thrombocytopenia dating to  when she was pregnant with a platelet count in the 50,000 range. She was also treated throughout her pregnancy with Lovenox at prophylactic doses due to prior miscarriages. She tolerated this well with no bleeding complications, platelet count appar ently spontaneously improved to the 80,000 range, eventually was 95,000 at the time of .    Approximately 10 years prior to hospitalization here at [facility], she had an area of folliculitis in the left outer aspect of labia requi ring incision and drainage treated empirically with antibiotics with Bactrim and doxycycline which she received over 10 days.   On the morning of admission she noted development of diffuse erythematous rash on her abdomen and back as well as lower extremities and buccal mucosa. She presented to Dr. Lee in [place] and was found to have a white count of 5000, hemoglobin 14.2 a n d platelet count of 1000. She was transferred to [facility] for admission and treatment for presumed ITP. Admitting studies include a CRP of .6, ANTONI 1:80 positive, , IPF 31.8, initial CBC with platelet count of 2000, H and H 13.9 , 40.7, WBC 4140. She was placed on Decadron orally and IVIG. Sedimentation rate was found to be 8 and on the 24th IPF was still 31.7. AFO screening was also positive incidentally and CMV antibody IgG was greater than 13.2, IgM .28, EBV antibodies were a lso considerably high, EBV antibody/nuclear antigen greater than 600 and EBV antibodies ECA IgG of 450 with an EBV antibody early IgG of 17. All of these are excessively high. The patient did fortunately respond however, by the 26th platelet count was u p to 84,000, IPF 6.7. It was elected at this point for the patient to be discharged home and followed back in the office with weekly counts and tapered steroids thereafter.   She had her counts done each week including 12/13/2010 at which time she was 201 ,000 and when seen on 12/20/2010, platelet count was 136,000. In reviewing her circumstances on that day, she went up to 30 mg of steroids and had her cut back very slowly on a weekly basis.   She returned on 01/17/2011, feeling well. Her IPF is also at normal levels today. Additional studies have been done to be certain that she does have additional hypercoagulable state as well  including lupus anticoagulant including lipid antibody screening. She has had both Pneumovax and meningococcal vaccine but no Haemophilus.   The patient presented back to the office 02/14/2011 after tapering off steroids and has been off of them for 1 week. Fortunately she remained relatively stable with platelet count 116,000 and we elected to follow her on an every other we ek basis. Fortunately her subsequent counts have been stable. She has been able to complete her vaccinations as necessary. Her subsequent testing here has shown platelets in the 205,000 range on 03/28/2011, 168,000 on 04/11/2011 and 148 on 04/18/2011. She continues to feel well otherwise and has agreed that she would have monthly checks for a period of time longer for at least a 6 month period from her hospitalization.   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 She may have had a viral illness as of late. Her counts checked 05/17/2011 with a platelet count of 198,000, 06/13/2011 of 138,000. As she returns it i s clear that her platelet count is actually lower into the near 90,000 range. We discussed this in part as possibly not necessarily related to ICP since her IPF is actually quite low and her smears do not show enlarged platelets.   As a result of the ab ove the patient was asked to have counts done closer to home and these were done at every two week intervals with a considerable variation seen. This includes on 08/03 of 146,000, 08/10 of 168,000, 08/17 of 113,000, 8/24 of 96,000 and today 106,000 here in the office. As a result of these findings in its variability thereof suggests that perhaps the patient has platelet clumping ongoing, and today we will test her for this as well.   The patient thereafter continued her usual lifestyle. [facility] plans for her to try to become  pregnant and she did quite quickly do so. Thereafter she now sees Dr. Vic Lock, high-risk obstetrics, and her platelet count checked there approximately every other week has been dropping. She is now seen back in our office and actually has a platelet count of 56,000 with an IPF of 7. A peripheral smear is currently pending.   As result of review 01/16/2012 the patient had multifactorial reasons for her thrombocytopenia including ITP, likely additional pl atelet clumping, and? gestational thrombocytopenia. In any case, she was reasonably stable at that point and we elected to follow her counts every other week. She returns back today having done this. She was also placed on aspirin by her high-risk OB a n d she is having increasing gum bleeding likely as a result of several reasons now. Her most recent platelet counts including 84,000 on 02/06/2012 and 68,000 when seen today, 02/14/2012. IPF interestingly is 4.7. Her pregnancy has otherwise gone well wi th no additional issues thus far. She is approximately 20 weeks' gestation.   The patient was asked to be checked at her family physicians office. She also followed up with her high risk OB and has platelet count ranging from 60,000 to as much as 80,000. Today when seen her platelet count is 79,000, IPF of 6.5. I discussed with Dr. Tomi Lock her high risk OB and plans were to try to hold off steroids unless she is below 50,000 wherein she would also discontinue aspirin. When seen today the pregn leanne has progressed nicely without any complication thus far. It is not clear if her delivery date is to be scheduled, but one expects that it might well be.   The patient's case was discussed with Dr. Tomi Lock with plans for her to again hold off steroids until she is below 50,000 at which time she will discontinue aspirin.   When seen again on 05/01/2012, pregnancy has progressed nicely and she is to see Dr. Tomi Lock in approximately 2 weeks. It is  likely that she will undergo .   The patient did proceed onto steroids just prior to her pregnancy and fortunately did quite well peripartum. The patient had not been seen since that time approximately a year when she is now seen back 2013. She had presented to her PCP' s office jus t recently with abdominal pain, slowly worsening without nausea, vomiting or change in bowel habits. She underwent CT scan of the abdomen, pelvis without abnormalities noted though her blood count was somewhat suspicious with an elevated white count to 18 , 000 and platelet count down to 33,000. Normal hemoglobin and hematocrit 14.3 and 41.97. Patient rechecked two days later with platelet count of 43,000 and white count apparently normal? She now presents back to our practice for reassessment feeling much b tereza per her abdominal pain with a normal performance status, otherwise no change in bowel function.   The patient thereafter was asked to be seen back and now presents 2014. She has been noticing in the last several weeks to months, pain in multiple joints including right hip, left shoulder and the bilateral knees. She notes a general stiffness throughout multiple joints in the a.m. as well. This is a new finding for her and she is quite troubled by it. She was undergoing assessment for it recently with additional laboratory studies done including 10/10/2013 with a platelet count found to be reduced to 86,000.   Additional laboratory results done also in [place] in 2013 include normal serum chemistries; platelet count now is 53,000, WBC 8700, he moglobin and hematocrit 14.6, and 43.6. ANTONI titer positive, RA rheumatoid factor of 7, ASO of 137, uric acid 3.7. The patient was thereafter referred back to our practice for her thrombocytopenia. She feels well today except for again a degree of joint d iscomfort in the distribution as described above.   The patient as a result of the above and suspicions for  rheumatologic disease was asked to undergo a series of studies. This included an ANTONI comprehensive panel that revealed anti-double-stranded DNA elevated at 21. Additionally, her RA panel suggested e a rly rheumatoid arthritis with a positive anti-CCP. As the patient's studies with Dr. Gonzáles however were not yet completed at the time she is seen back in our office. The patient at this point had continued steroids though when seen back in the office 01/3 1 /2014 her platelet count was 199,000. She was asked to taper prednisone down to 10 mg daily and when seen 02/07/2014 her platelet count is 89,000. The patient feels well overall today when seen. The joint discomfort has improved remarkably on the steroi d doses.   Patient seen on 02/21/2014 platelet count approximately 70,000 on 5 mg every other day per prednisone. Rheumatologic assessment ongoing mild increase in antiphospholipid antibody level.   The patient, after last being seen, was asked to drop predn isone down to 5 mg daily and then 5 mg every other day. She is now seeing Dr. Minoo Gonzáles who is assessing her as well and we sent additional laboratory studies, demonstrating mild elevation in antiphospholipid antibodies as well as glycoprotein antibody . It was also noted that her TTP level was up to 63. Ms. Glass indicates that she is being considered for potentially lupus as an underlying diagnosis. Dr. Gonzáles would like to check her off steroids and thus we discussed tapering off further at this po int with platelet count is now reasonably acceptable.   The patient thereafter came off steroids and has been doing relatively well. As she now however, presents back it is recognized that her platelet count has been slowly dropping. This includes the 05/27 /2014 assessment with a platelet count of 30,000 with assessment on 06/11/2014 at 25,000 and today when seen on 06/13/2014 the platelet count is 20,000 with IPF 14.5. The patient states she is having  increasing joint pain but has had no evidence of ecchym oses or petechial lesions and no additional gingival bleeding, epistaxis or vaginal bleeding. She in fact feels reasonably good except for the joint pain that she feels is manageable. She is, however, concerned about her platelet count and rightly so.   The patient thereafter was started on Plaquenil through Rheumatology and over the next several weeks, she had felt somewhat better. In fact when she is seen back today, 07/14/2014, she no longer has joint discomfort. She does have thrombocytopenia, however, a t approximately the same levels as she did previously. She had been on steroids in the last several weeks as they were tapered off. This was revealed when her platelet count was 23,000 three weeks ago and is recognized today. Today, however, her platelet count is 28,000. She has had no additional bleeding issues however, since last seen.   The patient, after being reviewed in July 2014, was asked to followup with plans for her to remain on Plaquenil, initiate Imuran which began in August. She stayed on th e medication, fortunately tolerating it well and as she was seen back today, 11/17/2014, overall feels as if she is having fewer rheumatologic symptoms. Unfortunately, hematologically, she remains [id number] with her platelet count at close to 60, 000.   The patient thereafter has followed up with rheumatology on an every 2 month basis and now returned back here 4 months from previous on 03/23/2015. Her symptoms for her rheumatologic disorder remains under control though her Imuran has been moved to 150 mg a day as well as her Plaquenil, maintained at the same dose. She feels reasonably good overall today without any new symptoms, but concerned about her platelet count that was close to 40,000 when reviewed today. The patient has had no bleeding in g ums, epistaxis, GI or  tract.   The patient is seen back in followup and indicates that her  rheumatologic symptoms remain under control with Imuran and Plaquenil. We find wonderfully enough that her platelet count is also improved to approximately 100,000 when seen in the office today as well.   Patient is now next reviewed June 01, 2016.  She is feeling well overall without additional rheumatologic symptoms.  She has been on Imuran for approximately 2 years and is a question of how long she'll need to stay on it.  She fortunately is hematologically stable when reviewed today platelet count of 116,000, and otherwise normal CBC     The patient is now reviewed December 50,016.  As a result of running out of her Imuran not having it refilled by a rheumatologist she has remained off Imuran.  She, fortunately, has not had any additional symptoms including rash development and/or joint discomfort.  We discussed rechecking her anticardiolipin antibodies, beta-2 glycoprotein antibodies and lupus anticoagulant at this point since she has a further degree of thrombocytopenia also present today.    The patient's subsequent laboratory studies were otherwise negative and she is now seen back May 31, 2017.  She continues to feel well without any joint disturbance but she does note bilateral malar rash which she tends to cover with makeup.    The patient presented February 18, 2022 to [facility] for preventive examination at this point having fairly heavy periods-?  Ablation or hysterectomy.Subsequent CBC including H&H of 13.3 and 41.4 white count of 9200, MCV 82.8, MCH of 26.6 and platelet count of 68,000, normal automated differential. Subsequent pelvic ultrasound revealed homogenous appearance to endometrium per uterus, endometrial borders well-defined, no intracavitary mass, normal endometrial thickness, normal cervix, right ovary normal, left ovary of 2 cysts 1 at just over 60 mm and possible endometrioma and 1 at just over 30 mm also possible endometrioma.  Unfortunately by late April 2022 her degree of  menorrhagia was worsening and she was referred to Dr. Craven who felt she should undergo a laparoscopic hysterectomy, bilateral salpingo-oophorectomy unilateral oophorectomy and possible staging possible laparotomy.  She is now referred back to try to improve her platelet count prior to any surgical procedure.     The patient is seen 5/31/2022 indicating that she has been routinely assessed by rheumatology at every 6 months while she is remained on Plaquenil undergoing laboratory studies and ophthalmologic assessments every 6 months.  She has had no issues until recently indicating her platelet count has been between 65 and 80,000.  She understands the above issues and the need to try to bring her platelet count up prior to surgical intervention by Dr. Craven.  She has had no symptoms, however, of progressive rheumatologic disease including lack of Raynaud's, further malar rash, joint discomfort, shortness of breath, cough, abdominal pain, change in bowel habit, joint swelling or neuropathy.  Additionally she has had no pelvic discomfort.    Past Medical History:   Diagnosis Date   • Cytopenia    • Endometriosis 2006   • Erythematous rash     ON ABDOMEN, BACK AND LOWER EXTREMITIES   • Gestational thrombocytopenia (HCC)    • H/O idiopathic thrombocytopenic purpura    • Hx of folliculitis     IN THE LEFT OUTER ASPECT OF LABIA   • ITP (idiopathic thrombocytopenic purpura)     INCLUDING PLATELET CLUMPING   • Joint pain    • Lupus (HCC)    • Thrombocytopenia (HCC)      ONCOLOGIC HISTORY:  (History from previous dates can be found in the separate document.)    Current Outpatient Medications on File Prior to Visit   Medication Sig Dispense Refill   • cetirizine (zyrTEC) 10 MG tablet Take  by mouth Daily.     • hydroxychloroquine (PLAQUENIL) 200 MG tablet Take  by mouth daily.     • multivitamin with minerals tablet tablet Take 1 tablet by mouth Daily.     • predniSONE (DELTASONE) 20 MG tablet Take 1 tablet by mouth  "Daily With Breakfast, Lunch & Dinner. 60 tablet 1     No current facility-administered medications on file prior to visit.     ALLERGIES:     Allergies   Allergen Reactions   • Bactrim [Sulfamethoxazole-Trimethoprim]        Social History     Socioeconomic History   • Marital status:    Tobacco Use   • Smoking status: Never Smoker   Substance and Sexual Activity   • Alcohol use: No   • Drug use: No         Cancer-related family history includes Testicular cancer in her brother. There is no history of Cancer.     Review of Systems  A comprehensive 14 point review of systems was performed and was negative except as mentioned.    Objective      Vitals:    06/07/22 0811   BP: 123/80   Pulse: 91   Resp: 18   Temp: 98.7 °F (37.1 °C)   TempSrc: Oral   SpO2: 99%   Weight: 52.8 kg (116 lb 6.4 oz)   Height: 162 cm (63.78\")   PainSc: 0-No pain     Current Status 6/7/2022   ECOG score 0     Physical Exam    GENERAL: Well-developed, well-nourished female in no acute distress.   SKIN: Warm, dry without rashes, purpura or petechiae.   HEAD: Normocephalic.   EYES: Pupils equal, round and reactive to light. EOMs intact. Conjunctivae normal.   EARS: Hearing intact.   NOSE: Septum midline. No excoriations or nasal discharge.   MOUTH: Tongue is well-papillated; no stomatitis or ulcers. Lips normal.   THROAT: Oropharynx without lesions or exudates.   NECK: Supple with good range of motion; no thyromegaly or masses, no JVD or bruits.   LYMPHATICS: No cervical, supraclavicular, axillary or inguinal adenopathy.   CHEST: Lungs clear to percussion and auscultation.   CARDIAC: Regular rate and rhythm without murmurs, rubs or gallops.   ABDOMEN: Soft, nontender with no organomegaly or masses.   EXTREMITIES: No clubbing, cyanosis or edema.   NEUROLOGICAL: No focal neurological deficits.   RECENT LABS:  Hematology WBC   Date Value Ref Range Status   06/07/2022 21.94 (H) 3.40 - 10.80 10*3/mm3 Final     RBC   Date Value Ref Range Status "   06/07/2022 5.22 3.77 - 5.28 10*6/mm3 Final     Hemoglobin   Date Value Ref Range Status   06/07/2022 14.9 12.0 - 15.9 g/dL Final     Hematocrit   Date Value Ref Range Status   06/07/2022 43.7 34.0 - 46.6 % Final     Platelets   Date Value Ref Range Status   06/07/2022 273 140 - 450 10*3/mm3 Final   06/07/2022 273 140 - 450 10*3/mm3 Final        Assessment & Plan   *ITP  · 42-year-old female with a history of idiopathic thrombocytopenic purpura during previous pregnancy.  · In addition, she has had a degree of pseudothrombocytopenia noted on periodic blood smear and likely a component of gestational thrombocytopenia previously.   · She had more recently a relapse of ITP, improved on steroid dosing. Her symptoms suggested she be tested further for rheumatologic disorder. She was seen by Dr. Gonzáles who diagnosed systemic lupus erythematosus, which evidently includes a component of antiphospholipid antibody syndrome as well.   · The patient was placed on Plaquenil and later Imuran, to which she continues to respond.   · Her platelet count had been noted to improved somewhat when she was seen in June of this year.    · Now which is reviewed in December she continues to do well clinically though her platelet count has dropped modestly with a very minimally elevated IPF.  It was suggested rechecking her anticardiolipin antibodies, beta-2 glycoprotein antibodies and lupus anticoagulant today while she remained off Imuran.  These studies were negative.  · The patient presented February 18, 2022 to [facility] for preventive examination at this point having fairly heavy periods-?  Ablation or hysterectomy.  Subsequent CBC including H&H of 13.3 and 41.4 white count of 9200, MCV 82.8, MCH of 26.6 and platelet count of 68,000, normal automated differential.   · Unfortunately by late April 2022 her degree of menorrhagia was worsening and she was referred to Dr. Craven who felt she should undergo a laparoscopic hysterectomy,  bilateral salpingo-oophorectomy unilateral oophorectomy and possible staging possible laparotomy.  She is now referred back to try to improve her platelet count prior to any surgical procedure.  · The patient is seen 5/31/2022.  She has had no issues until recently indicating her platelet count has been between 65 and 80,000.  She was initiated on prednisone 20 mg 3 times daily (40 mg at breakfast, 20 mg in the afternoon).  · 6/7/2022, platelet count today has significantly improved, now normal at 273,000.  Discussed with Dr. Eastman, patient will be cleared from our standpoint for surgery with Dr. Craven.  Because she responded so well to prednisone, we will reduce dose to 40 mg.  She will return in 1 week for CBC and MD follow-up to potentially continue to taper prednisone.  · Scheduled for surgery 7/14/2022.    *Systemic lupus erythematosus  · She was seen by Dr. Gonzáles who diagnosed systemic lupus erythematosus, which evidently includes a component of antiphospholipid antibody syndrome as well.   · The patient was placed on Plaquenil and later Imuran, to which she continues to respond.   · It was suggested rechecking her anticardiolipin antibodies, beta-2 glycoprotein antibodies and lupus anticoagulant today while she remained off Imuran.  These studies were negative.  · She continues to follow with rheumatology every 6 months while she is on Plaquenil.  She undergoes every 6-month ophthalmologic assessment and lab assessment.    *Menorrhagia  · The patient presented February 18, 2022 to [facility] for preventive examination at this point having fairly heavy periods-?  Ablation or hysterectomy.  · Subsequent pelvic ultrasound revealed homogenous appearance to endometrium per uterus, endometrial borders well-defined, no intracavitary mass, normal endometrial thickness, normal cervix, right ovary normal, left ovary of 2 cysts 1 at just over 60 mm and possible endometrioma and 1 at just over 30 mm also possible  endometrioma.    · Unfortunately by late April 2022 her degree of menorrhagia was worsening and she was referred to Dr. Craven who felt she should undergo a laparoscopic hysterectomy, bilateral salpingo-oophorectomy unilateral oophorectomy and possible staging possible laparotomy.  She is now referred back to try to improve her platelet count prior to any surgical procedure.  · Ca1 25 elevated at 90.1 indicating that patient needs to have cysts removed.  Cyst themselves can elevate this marker.  We will work to improve platelet count so patient can have procedure.  · Patient will now be cleared for surgery from oncology standpoint, his platelets have returned to normal at 273,000.  · Scheduled for surgery 7/14/2022.    Plan:   · Reduce prednisone to 40 mg daily.  · Start Pepcid 20 mg for heartburn while on prednisone.  · Patient is cleared from oncology standpoint to proceed with surgery with Dr. Craven.  Advised the patient to call their office to schedule, I will also send a message to Dr. Craven.  · Return in 1 week for CBC and MD follow-up.    The patient was discussed with Dr. Eastman who is in agreement with the above-mentioned plan.    I spent 35 minutes caring for Regi on this date of service. This time includes time spent by me in the following activities: preparing for the visit, reviewing tests, obtaining and/or reviewing a separately obtained history, performing a medically appropriate examination and/or evaluation, counseling and educating the patient/family/caregiver, documenting information in the medical record and care coordination.     ALBANIA Ruiz  06/07/22    ADDENDUM: Patient is scheduled for surgery with Dr. Craven on 7/14/2022.

## 2022-06-07 ENCOUNTER — TELEPHONE (OUTPATIENT)
Dept: GYNECOLOGIC ONCOLOGY | Facility: CLINIC | Age: 42
End: 2022-06-07

## 2022-06-07 ENCOUNTER — OFFICE VISIT (OUTPATIENT)
Dept: ONCOLOGY | Facility: CLINIC | Age: 42
End: 2022-06-07

## 2022-06-07 ENCOUNTER — LAB (OUTPATIENT)
Dept: OTHER | Facility: HOSPITAL | Age: 42
End: 2022-06-07

## 2022-06-07 VITALS
OXYGEN SATURATION: 99 % | SYSTOLIC BLOOD PRESSURE: 123 MMHG | BODY MASS INDEX: 19.87 KG/M2 | TEMPERATURE: 98.7 F | HEIGHT: 64 IN | HEART RATE: 91 BPM | WEIGHT: 116.4 LBS | DIASTOLIC BLOOD PRESSURE: 80 MMHG | RESPIRATION RATE: 18 BRPM

## 2022-06-07 DIAGNOSIS — D68.61 ANTIPHOSPHOLIPID ANTIBODY SYNDROME: Primary | ICD-10-CM

## 2022-06-07 DIAGNOSIS — D69.3 ACUTE ITP: ICD-10-CM

## 2022-06-07 DIAGNOSIS — D68.61 ANTIPHOSPHOLIPID ANTIBODY SYNDROME: ICD-10-CM

## 2022-06-07 LAB
BASOPHILS # BLD AUTO: 0.06 10*3/MM3 (ref 0–0.2)
BASOPHILS NFR BLD AUTO: 0.3 % (ref 0–1.5)
DEPRECATED RDW RBC AUTO: 37.7 FL (ref 37–54)
EOSINOPHIL # BLD AUTO: 0 10*3/MM3 (ref 0–0.4)
EOSINOPHIL NFR BLD AUTO: 0 % (ref 0.3–6.2)
ERYTHROCYTE [DISTWIDTH] IN BLOOD BY AUTOMATED COUNT: 12.4 % (ref 12.3–15.4)
HCT VFR BLD AUTO: 43.7 % (ref 34–46.6)
HGB BLD-MCNC: 14.9 G/DL (ref 12–15.9)
IMM GRANULOCYTES # BLD AUTO: 0.23 10*3/MM3 (ref 0–0.05)
IMM GRANULOCYTES NFR BLD AUTO: 1 % (ref 0–0.5)
LYMPHOCYTES # BLD AUTO: 1.59 10*3/MM3 (ref 0.7–3.1)
LYMPHOCYTES NFR BLD AUTO: 7.2 % (ref 19.6–45.3)
MCH RBC QN AUTO: 28.5 PG (ref 26.6–33)
MCHC RBC AUTO-ENTMCNC: 34.1 G/DL (ref 31.5–35.7)
MCV RBC AUTO: 83.7 FL (ref 79–97)
MONOCYTES # BLD AUTO: 0.94 10*3/MM3 (ref 0.1–0.9)
MONOCYTES NFR BLD AUTO: 4.3 % (ref 5–12)
NEUTROPHILS NFR BLD AUTO: 19.12 10*3/MM3 (ref 1.7–7)
NEUTROPHILS NFR BLD AUTO: 87.2 % (ref 42.7–76)
NRBC BLD AUTO-RTO: 0 /100 WBC (ref 0–0.2)
PLATELET # BLD AUTO: 273 10*3/MM3 (ref 140–450)
PLATELET # BLD AUTO: 273 10*3/MM3 (ref 140–450)
PLATELETS.RETICULATED NFR BLD AUTO: 3.6 % (ref 0.9–6.5)
PMV BLD AUTO: 9.8 FL (ref 6–12)
RBC # BLD AUTO: 5.22 10*6/MM3 (ref 3.77–5.28)
WBC NRBC COR # BLD: 21.94 10*3/MM3 (ref 3.4–10.8)

## 2022-06-07 PROCEDURE — 99214 OFFICE O/P EST MOD 30 MIN: CPT | Performed by: NURSE PRACTITIONER

## 2022-06-07 PROCEDURE — 85055 RETICULATED PLATELET ASSAY: CPT | Performed by: INTERNAL MEDICINE

## 2022-06-07 PROCEDURE — 85025 COMPLETE CBC W/AUTO DIFF WBC: CPT | Performed by: INTERNAL MEDICINE

## 2022-06-07 PROCEDURE — 36415 COLL VENOUS BLD VENIPUNCTURE: CPT

## 2022-06-07 NOTE — TELEPHONE ENCOUNTER
Caller: Isaiah Glass    Relationship: Self    Best call back number: [phone]    What was the call regarding: ISAIAH CALLED TO SAY THAT HER DOCTOR HAS CLEARED HER FOR SURGERY. SHE SAYS SHE WAS TOLD TO CALL AND LET DR. ARELLANO KNOW.    Do you require a callback: YES

## 2022-06-07 NOTE — TELEPHONE ENCOUNTER
MA spoke with pt. Pt states just left from Dr. Eastman's office seeing Katelin Carpio NP. States she was cleared for surgery. MA will reach out to Dr. Eastman's nurse (Vanita) and get them to fax us a surgery clearance. After we receive the clearance and [facility] approves, we will have the surgery scheduler reach out to the pt and get her on the books for surgery. Pt verbalized understanding.

## 2022-06-10 NOTE — PROGRESS NOTES
Subjective .  Patient not having substantial side effects from steroid therapy, has follow-up with gynecologic oncology 6/20/2022    REASONS FOR FOLLOWUP:    1. Idiopathic thrombocytopenic purpura.   2. Variable thrombocytopenia including platelet clumping. ?Gestational thrombocytopenia noted when reviewed 01/16/2012 and 02/14/2012.   3. Patient seen 01/16/2012 when 16 weeks pregnant.   4. Patient seen 05/01/2012 nearing term. Platelet count approximately 80,000.   5. Patient seen 01/24/2014, further thrombocytopenia, now associated with increasing joint pain-polyarticular joint garo n, rheumatologic assessment initiated, consult requested, and prednisone initiated 20 mg oral daily.   6. Patient status post rheumatologic assessment and results pending, platelet count responsive to oral steroids, reduced; steroid taper planned.   7. Patient seen on 02/21/2014, platelet count approximately 70,000 on 5 mg every other day per prednisone. Rheumatologic assessment ongoing. SLA(?). Mild increase in antiphospholipid antibody levels.   8. The patient seen on 06/13/2014, Plaquenil initiated for apparel sy stemic lupus erythematosus associated elevated antiphospholipid antibody, further thrombocytopenia noted in our office and to restart steroids over a 2 week period.   9. The patient seen 07/14/2014 - improvement per generalized rheumatologic symptoms from cyto penia persisting, Plaquenil continued. Recheck over the subsequent 2 months planned.   10. Patient seen 11/10/2014, platelet count approximately 50,000, pending stabilization of rheumatologic symptoms. Reassessment in 4 months planned.   11. Patient was seen on 03 /23/2015, platelet count between 40,000 and 50,000, again with stabilization of rheumatologic symptoms, reassessment in 6 months' time.   12. Patient seen 09/16/2015, platelet count of 92,000, improvement of rheumatologic symptoms, every 6 month assessments pl anned.   13. Patient seen June 01, 2016 stable,  platelet count 1 or 16,000 additional rheumatologic symptoms controlled?  Length of Imuran use  14. Patient reviewed December 5, off Imuran for approximate 6 months, anticardiolipin antibodies, lupus anticoagulant, beta-2 glycoprotein antibodies rechecked  15. Patient reviewed May 31, 2017 clinically stable, platelet count acceptable, yearly follow-up planned  16. Patient reassessed 5/31/22 with bilateral ovarian cysts, plans for surgical intervention per gynecologic oncology, reassessment per antiphospholipid antibody syndrome, steroids initiated with prednisone 1 mg/kg  17. Patient seen in follow-up 6/14/2022 responding to p.o. steroids, adjusted as needed.        History of Present Illness        The patient returns today for follow-up.  She continues on prednisone 40 mg daily.  She is tolerating prednisone well aside from occasional heartburn.  Heartburn typically occurs with meals, which is also when she has been taking her prednisone.  She currently does not take anything for heartburn but we discussed adding Pepcid to her therapy.  She also experiences occasional flushing, maybe once daily.  She is eating and drinking adequately, her weight remains stable.  Her bowels are moving regularly.  She denies insomnia.  She denies fever or chills.  She denies signs or symptoms of bleeding.  She denies nausea or vomiting.     She is seen with her mother 6/14/2022 with plans to be seen by Dr. Craven 6/20/2022.      HEMATOLOGY HISTORY:  The patient is now a 42-year-old female with a history of ITP and variable thrombocytopenia also associated with platelet clumping and gestational thrombocytopenia. The patient was seen during her pregnancy 01/16/2012, 02/ 14/2012 and again 03/27/2012 at approximately 27 weeks, platelet count 70,000-80,000. She was admitted to [facility] with a platelet count of 1000 along with red purpura and petechiae. She has a history of thrombocytopenia dating to 06/ 2 009 when  she was pregnant with a platelet count in the 50,000 range. She was also treated throughout her pregnancy with Lovenox at prophylactic doses due to prior miscarriages. She tolerated this well with no bleeding complications, platelet count appar ently spontaneously improved to the 80,000 range, eventually was 95,000 at the time of .   Approximately 10 years prior to hospitalization here at [facility], she had an area of folliculitis in the left outer aspect of labia requi ring incision and drainage treated empirically with antibiotics with Bactrim and doxycycline which she received over 10 days.   On the morning of admission she noted development of diffuse erythematous rash on her abdomen and back as well as lower extremities and buccal mucosa. She presented to Dr. Lee in [place] and was found to have a white count of 5000, hemoglobin 14.2 a n d platelet count of 1000. She was transferred to [facility] for admission and treatment for presumed ITP. Admitting studies include a CRP of .6, ANTONI 1:80 positive, , IPF 31.8, initial CBC with platelet count of 2000, H and H 13.9 , 40.7, WBC 4140. She was placed on Decadron orally and IVIG. Sedimentation rate was found to be 8 and on the  IPF was still 31.7. AFO screening was also positive incidentally and CMV antibody IgG was greater than 13.2, IgM .28, EBV antibodies were a lso considerably high, EBV antibody/nuclear antigen greater than 600 and EBV antibodies ECA IgG of 450 with an EBV antibody early IgG of 17. All of these are excessively high. The patient did fortunately respond however, by the  platelet count was u p to 84,000, IPF 6.7. It was elected at this point for the patient to be discharged home and followed back in the office with weekly counts and tapered steroids thereafter.   She had her counts done each week including 2010 at which time she was 201 ,000 and when seen on 2010, platelet  count was 136,000. In reviewing her circumstances on that day, she went up to 30 mg of steroids and had her cut back very slowly on a weekly basis.   She returned on 01/17/2011, feeling well. Her IPF is also at normal levels today. Additional studies have been done to be certain that she does have additional hypercoagulable state as well including lupus anticoagulant including lipid antibody screening. She has had both Pneumovax and meningococcal vaccine but no Haemophilus.   The patient presented back to the office 02/14/2011 after tapering off steroids and has been off of them for 1 week. Fortunately she remained relatively stable with platelet count 116,000 and we elected to follow her on an every other we ek basis. Fortunately her subsequent counts have been stable. She has been able to complete her vaccinations as necessary. Her subsequent testing here has shown platelets in the 205,000 range on 03/28/2011, 168,000 on 04/11/2011 and 148 on 04/18/2011. She continues to feel well otherwise and has agreed that she would have monthly checks for a period of time longer for at least a 6 month period from her hospitalization.   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 She may have had a viral illness as of late. Her counts checked 05/17/2011 with a platelet count of 198,000, 06/13/2011 of 138,000. As she returns it i s clear that her platelet count is actually lower into the near 90,000 range. We discussed this in part as possibly not necessarily related to ICP since her IPF is actually quite low and her smears do not show enlarged platelets.   As a result of the ab ove the patient was asked to have counts done closer to home and these were done at every two week intervals with a considerable variation seen. This includes on 08/03 of 146,000, 08/10 of 168,000, 08/17 of  113,000, 8/24 of 96,000 and today 106,000 here in the office. As a result of these findings in its variability thereof suggests that perhaps the patient has platelet clumping ongoing, and today we will test her for this as well.   The patient thereafter continued her usual lifestyle. [facility] for her to try to become pregnant and she did quite quickly do so. Thereafter she now sees Dr. Vic Lock, high-risk obstetrics, and her platelet count checked there approximately every other week has been dropping. She is now seen back in our office and actually has a platelet count of 56,000 with an IPF of 7. A peripheral smear is currently pending.   As result of review 01/16/2012 the patient had multifactorial reasons for her thrombocytopenia including ITP, likely additional pl atelet clumping, and? gestational thrombocytopenia. In any case, she was reasonably stable at that point and we elected to follow her counts every other week. She returns back today having done this. She was also placed on aspirin by her high-risk OB a n d she is having increasing gum bleeding likely as a result of several reasons now. Her most recent platelet counts including 84,000 on 02/06/2012 and 68,000 when seen today, 02/14/2012. IPF interestingly is 4.7. Her pregnancy has otherwise gone well wi th no additional issues thus far. She is approximately 20 weeks' gestation.   The patient was asked to be checked at her family physicians office. She also followed up with her high risk OB and has platelet count ranging from 60,000 to as much as 80,000. Today when seen her platelet count is 79,000, IPF of 6.5. I discussed with Dr. Tomi Lock her high risk OB and plans were to try to hold off steroids unless she is below 50,000 wherein she would also discontinue aspirin. When seen today the pregn leanne has progressed nicely without any complication thus far. It is not clear if her delivery date is to be scheduled, but  one expects that it might well be.   The patient's case was discussed with Dr. Tomi Lock with plans for her to again hold off steroids until she is below 50,000 at which time she will discontinue aspirin.   When seen again on 2012, pregnancy has progressed nicely and she is to see Dr. Tomi Lock in approximately 2 weeks. It is likely that she will undergo .   The patient did proceed onto steroids just prior to her pregnancy and fortunately did quite well peripartum. The patient had not been seen since that time approximately a year when she is now seen back 2013. She had presented to her PCP' s office jus t recently with abdominal pain, slowly worsening without nausea, vomiting or change in bowel habits. She underwent CT scan of the abdomen, pelvis without abnormalities noted though her blood count was somewhat suspicious with an elevated white count to 18 , 000 and platelet count down to 33,000. Normal hemoglobin and hematocrit 14.3 and 41.97. Patient rechecked two days later with platelet count of 43,000 and white count apparently normal? She now presents back to our practice for reassessment feeling much b tereza per her abdominal pain with a normal performance status, otherwise no change in bowel function.   The patient thereafter was asked to be seen back and now presents 2014. She has been noticing in the last several weeks to months, pain in multiple joints including right hip, left shoulder and the bilateral knees. She notes a general stiffness throughout multiple joints in the a.m. as well. This is a new finding for her and she is quite troubled by it. She was undergoing assessment for it recently with additional laboratory studies done including 10/10/2013 with a platelet count found to be reduced to 86,000.   Additional laboratory results done also in [place] in 2013 include normal serum chemistries; platelet count now is 53,000, WBC 8700, he moglobin and hematocrit  14.6, and 43.6. ANTONI titer positive, RA rheumatoid factor of 7, ASO of 137, uric acid 3.7. The patient was thereafter referred back to our practice for her thrombocytopenia. She feels well today except for again a degree of joint d iscomfort in the distribution as described above.   The patient as a result of the above and suspicions for rheumatologic disease was asked to undergo a series of studies. This included an ANTONI comprehensive panel that revealed anti-double-stranded DNA elevated at 21. Additionally, her RA panel suggested e a rly rheumatoid arthritis with a positive anti-CCP. As the patient's studies with Dr. Gonzáles however were not yet completed at the time she is seen back in our office. The patient at this point had continued steroids though when seen back in the office 01/3 1 /2014 her platelet count was 199,000. She was asked to taper prednisone down to 10 mg daily and when seen 02/07/2014 her platelet count is 89,000. The patient feels well overall today when seen. The joint discomfort has improved remarkably on the steroi d doses.   Patient seen on 02/21/2014 platelet count approximately 70,000 on 5 mg every other day per prednisone. Rheumatologic assessment ongoing mild increase in antiphospholipid antibody level.   The patient, after last being seen, was asked to drop predn isone down to 5 mg daily and then 5 mg every other day. She is now seeing Dr. Minoo Gonzáles who is assessing her as well and we sent additional laboratory studies, demonstrating mild elevation in antiphospholipid antibodies as well as glycoprotein antibody . It was also noted that her TTP level was up to 63. Ms. Glass indicates that she is being considered for potentially lupus as an underlying diagnosis. Dr. Gonzáles would like to check her off steroids and thus we discussed tapering off further at this po int with platelet count is now reasonably acceptable.   The patient thereafter came off steroids and has been doing relatively  well. As she now however, presents back it is recognized that her platelet count has been slowly dropping. This includes the 05/27 /2014 assessment with a platelet count of 30,000 with assessment on 06/11/2014 at 25,000 and today when seen on 06/13/2014 the platelet count is 20,000 with IPF 14.5. The patient states she is having increasing joint pain but has had no evidence of ecchym oses or petechial lesions and no additional gingival bleeding, epistaxis or vaginal bleeding. She in fact feels reasonably good except for the joint pain that she feels is manageable. She is, however, concerned about her platelet count and rightly so.   The patient thereafter was started on Plaquenil through Rheumatology and over the next several weeks, she had felt somewhat better. In fact when she is seen back today, 07/14/2014, she no longer has joint discomfort. She does have thrombocytopenia, however, a t approximately the same levels as she did previously. She had been on steroids in the last several weeks as they were tapered off. This was revealed when her platelet count was 23,000 three weeks ago and is recognized today. Today, however, her platelet count is 28,000. She has had no additional bleeding issues however, since last seen.   The patient, after being reviewed in July 2014, was asked to followup with plans for her to remain on Plaquenil, initiate Imuran which began in August. She stayed on th e medication, fortunately tolerating it well and as she was seen back today, 11/17/2014, overall feels as if she is having fewer rheumatologic symptoms. Unfortunately, hematologically, she remains [id number] with her platelet count at close to 60, 000.   The patient thereafter has followed up with rheumatology on an every 2 month basis and now returned back here 4 months from previous on 03/23/2015. Her symptoms for her rheumatologic disorder remains under control though her Imuran has been moved to 150 mg a day as well as  her Plaquenil, maintained at the same dose. She feels reasonably good overall today without any new symptoms, but concerned about her platelet count that was close to 40,000 when reviewed today. The patient has had no bleeding in g ums, epistaxis, GI or  tract.   The patient is seen back in followup and indicates that her rheumatologic symptoms remain under control with Imuran and Plaquenil. We find wonderfully enough that her platelet count is also improved to approximately 100,000 when seen in the office today as well.   Patient is now next reviewed June 01, 2016.  She is feeling well overall without additional rheumatologic symptoms.  She has been on Imuran for approximately 2 years and is a question of how long she'll need to stay on it.  She fortunately is hematologically stable when reviewed today platelet count of 116,000, and otherwise normal CBC     The patient is now reviewed December 50,016.  As a result of running out of her Imuran not having it refilled by a rheumatologist she has remained off Imuran.  She, fortunately, has not had any additional symptoms including rash development and/or joint discomfort.  We discussed rechecking her anticardiolipin antibodies, beta-2 glycoprotein antibodies and lupus anticoagulant at this point since she has a further degree of thrombocytopenia also present today.    The patient's subsequent laboratory studies were otherwise negative and she is now seen back May 31, 2017.  She continues to feel well without any joint disturbance but she does note bilateral malar rash which she tends to cover with makeup.    The patient presented February 18, 2022 to [facility] for preventive examination at this point having fairly heavy periods-?  Ablation or hysterectomy.Subsequent CBC including H&H of 13.3 and 41.4 white count of 9200, MCV 82.8, MCH of 26.6 and platelet count of 68,000, normal automated differential. Subsequent pelvic ultrasound revealed homogenous appearance to  endometrium per uterus, endometrial borders well-defined, no intracavitary mass, normal endometrial thickness, normal cervix, right ovary normal, left ovary of 2 cysts 1 at just over 60 mm and possible endometrioma and 1 at just over 30 mm also possible endometrioma.  Unfortunately by late April 2022 her degree of menorrhagia was worsening and she was referred to Dr. Craven who felt she should undergo a laparoscopic hysterectomy, bilateral salpingo-oophorectomy unilateral oophorectomy and possible staging possible laparotomy.  She is now referred back to try to improve her platelet count prior to any surgical procedure.     The patient is seen 5/31/2022 indicating that she has been routinely assessed by rheumatology at every 6 months while she is remained on Plaquenil undergoing laboratory studies and ophthalmologic assessments every 6 months.  She has had no issues until recently indicating her platelet count has been between 65 and 80,000.  She understands the above issues and the need to try to bring her platelet count up prior to surgical intervention by Dr. Craven.  She has had no symptoms, however, of progressive rheumatologic disease including lack of Raynaud's, further malar rash, joint discomfort, shortness of breath, cough, abdominal pain, change in bowel habit, joint swelling or neuropathy.  Additionally she has had no pelvic discomfort.    Patient responded quickly to p.o. steroids at 1 mg/kg with repeat CBC 6/7/2022 and platelet count of 273,000, IPF now reduced to 3.6, previous  level 90.1.  Patient was dropped to 40 mg daily next seen 6/14/2022 with platelet count 197,000, prednisone dropped to 30 mg daily and plans to reassess 6/20/2022.    Past Medical History:   Diagnosis Date   • Cytopenia    • Endometriosis 2006   • Erythematous rash     ON ABDOMEN, BACK AND LOWER EXTREMITIES   • Gestational thrombocytopenia (HCC)    • H/O idiopathic thrombocytopenic purpura    • Hx of folliculitis      "IN THE LEFT OUTER ASPECT OF LABIA   • ITP (idiopathic thrombocytopenic purpura)     INCLUDING PLATELET CLUMPING   • Joint pain    • Lupus (HCC)    • Thrombocytopenia (HCC)      ONCOLOGIC HISTORY:  (History from previous dates can be found in the separate document.)    Current Outpatient Medications on File Prior to Visit   Medication Sig Dispense Refill   • cetirizine (zyrTEC) 10 MG tablet Take  by mouth Daily.     • hydroxychloroquine (PLAQUENIL) 200 MG tablet Take  by mouth daily.     • multivitamin with minerals tablet tablet Take 1 tablet by mouth Daily.     • predniSONE (DELTASONE) 20 MG tablet Take 1 tablet by mouth Daily With Breakfast, Lunch & Dinner. 60 tablet 1     No current facility-administered medications on file prior to visit.     ALLERGIES:     Allergies   Allergen Reactions   • Bactrim [Sulfamethoxazole-Trimethoprim]        Social History     Socioeconomic History   • Marital status:    Tobacco Use   • Smoking status: Never Smoker   Substance and Sexual Activity   • Alcohol use: No   • Drug use: No         Cancer-related family history includes Testicular cancer in her brother. There is no history of Cancer.     Review of Systems  A comprehensive 14 point review of systems was performed and was negative except as mentioned.    Objective      Vitals:    06/14/22 1024   BP: 115/77   Pulse: 92   Resp: 16   Temp: 97.7 °F (36.5 °C)   TempSrc: Temporal   SpO2: 99%   Weight: 53.1 kg (117 lb 1.6 oz)   Height: 162 cm (63.78\")   PainSc: 0-No pain     Current Status 6/14/2022   ECOG score 0     Physical Exam    GENERAL: Well-developed, well-nourished female in no acute distress.   SKIN: Warm, dry without rashes, purpura or petechiae.   HEAD: Normocephalic.   EYES: Pupils equal, round and reactive to light. EOMs intact. Conjunctivae normal.   EARS: Hearing intact.   NOSE: Septum midline. No excoriations or nasal discharge.   MOUTH: Tongue is well-papillated; no stomatitis or ulcers. Lips normal. "   THROAT: Oropharynx without lesions or exudates.   NECK: Supple with good range of motion; no thyromegaly or masses, no JVD or bruits.   LYMPHATICS: No cervical, supraclavicular, axillary or inguinal adenopathy.   CHEST: Lungs clear to percussion and auscultation.   CARDIAC: Regular rate and rhythm without murmurs, rubs or gallops.   ABDOMEN: Soft, nontender with no organomegaly or masses.   EXTREMITIES: No clubbing, cyanosis or edema.   NEUROLOGICAL: No focal neurological deficits.   RECENT LABS:  Hematology WBC   Date Value Ref Range Status   06/14/2022 20.27 (H) 3.40 - 10.80 10*3/mm3 Final     RBC   Date Value Ref Range Status   06/14/2022 5.08 3.77 - 5.28 10*6/mm3 Final     Hemoglobin   Date Value Ref Range Status   06/14/2022 14.4 12.0 - 15.9 g/dL Final     Hematocrit   Date Value Ref Range Status   06/14/2022 44.6 34.0 - 46.6 % Final     Platelets   Date Value Ref Range Status   06/14/2022 197 140 - 450 10*3/mm3 Final        Assessment & Plan   *ITP  · 42-year-old female with a history of idiopathic thrombocytopenic purpura during previous pregnancy.  · In addition, she has had a degree of pseudothrombocytopenia noted on periodic blood smear and likely a component of gestational thrombocytopenia previously.   · She had more recently a relapse of ITP, improved on steroid dosing. Her symptoms suggested she be tested further for rheumatologic disorder. She was seen by Dr. Gonzáles who diagnosed systemic lupus erythematosus, which evidently includes a component of antiphospholipid antibody syndrome as well.   · The patient was placed on Plaquenil and later Imuran, to which she continues to respond.   · Her platelet count had been noted to improved somewhat when she was seen in June of this year.    · Now which is reviewed in December she continues to do well clinically though her platelet count has dropped modestly with a very minimally elevated IPF.  It was suggested rechecking her anticardiolipin antibodies,  beta-2 glycoprotein antibodies and lupus anticoagulant today while she remained off Imuran.  These studies were negative.  · The patient presented February 18, 2022 to [facility] for preventive examination at this point having fairly heavy periods-?  Ablation or hysterectomy.  Subsequent CBC including H&H of 13.3 and 41.4 white count of 9200, MCV 82.8, MCH of 26.6 and platelet count of 68,000, normal automated differential.   · Unfortunately by late April 2022 her degree of menorrhagia was worsening and she was referred to Dr. Craven who felt she should undergo a laparoscopic hysterectomy, bilateral salpingo-oophorectomy unilateral oophorectomy and possible staging possible laparotomy.  She is now referred back to try to improve her platelet count prior to any surgical procedure.  · The patient is seen 5/31/2022.  She has had no issues until recently indicating her platelet count has been between 65 and 80,000.  She was initiated on prednisone 20 mg 3 times daily (40 mg at breakfast, 20 mg in the afternoon).  · 6/7/2022, platelet count today has significantly improved, now normal at 273,000.  Discussed with Dr. Eastman, patient will be cleared from our standpoint for surgery with Dr. Craven.  Because she responded so well to prednisone, we will reduce dose to 40 mg.  She will return in 1 week for CBC and MD follow-up to potentially continue to taper prednisone.  · Patient seen 6/14/2022 with platelet count of 197,000, prednisone dropped to 30 mg daily, follow-up assessment 6/20/2022 same day as gynecologic oncology.    *Systemic lupus erythematosus  · She was seen by Dr. Gonzáles who diagnosed systemic lupus erythematosus, which evidently includes a component of antiphospholipid antibody syndrome as well.   · The patient was placed on Plaquenil and later Imuran, to which she continues to respond.   · It was suggested rechecking her anticardiolipin antibodies, beta-2 glycoprotein antibodies and lupus anticoagulant  today while she remained off Imuran.  These studies were negative.  · She continues to follow with rheumatology every 6 months while she is on Plaquenil.  She undergoes every 6-month ophthalmologic assessment and lab assessment.    *Menorrhagia  · The patient presented February 18, 2022 to [facility] for preventive examination at this point having fairly heavy periods-?  Ablation or hysterectomy.  · Subsequent pelvic ultrasound revealed homogenous appearance to endometrium per uterus, endometrial borders well-defined, no intracavitary mass, normal endometrial thickness, normal cervix, right ovary normal, left ovary of 2 cysts 1 at just over 60 mm and possible endometrioma and 1 at just over 30 mm also possible endometrioma.    · Unfortunately by late April 2022 her degree of menorrhagia was worsening and she was referred to Dr. Craven who felt she should undergo a laparoscopic hysterectomy, bilateral salpingo-oophorectomy unilateral oophorectomy and possible staging possible laparotomy.  She is now referred back to try to improve her platelet count prior to any surgical procedure.  · Ca1 25 elevated at 90.1 indicating that patient needs to have cysts removed.  Cyst themselves can elevate this marker.  We will work to improve platelet count so patient can have procedure.  · Patient will now be cleared for surgery from oncology standpoint, his platelets have returned to normal at 273,000.  · Scheduled for surgery possibly beginning July 2022.    Plan:   · Reduce prednisone to 30 mg daily.  · Start Pepcid 20 mg for heartburn while on prednisone.  · Patient is cleared from oncology standpoint to proceed with surgery with Dr. Craven.  Advised the patient to call their office to schedule, I will also send a message to Dr. Craven.  · Follow-up MD assessment 6/20/2022  Jerad Eastman MD  06/14/22

## 2022-06-14 ENCOUNTER — LAB (OUTPATIENT)
Dept: OTHER | Facility: HOSPITAL | Age: 42
End: 2022-06-14

## 2022-06-14 ENCOUNTER — OFFICE VISIT (OUTPATIENT)
Dept: ONCOLOGY | Facility: CLINIC | Age: 42
End: 2022-06-14

## 2022-06-14 VITALS
RESPIRATION RATE: 16 BRPM | TEMPERATURE: 97.7 F | BODY MASS INDEX: 19.99 KG/M2 | SYSTOLIC BLOOD PRESSURE: 115 MMHG | OXYGEN SATURATION: 99 % | HEIGHT: 64 IN | HEART RATE: 92 BPM | DIASTOLIC BLOOD PRESSURE: 77 MMHG | WEIGHT: 117.1 LBS

## 2022-06-14 DIAGNOSIS — D68.61 ANTIPHOSPHOLIPID ANTIBODY SYNDROME: ICD-10-CM

## 2022-06-14 DIAGNOSIS — D68.61 ANTIPHOSPHOLIPID ANTIBODY SYNDROME: Primary | ICD-10-CM

## 2022-06-14 LAB
BASOPHILS # BLD AUTO: 0.05 10*3/MM3 (ref 0–0.2)
BASOPHILS NFR BLD AUTO: 0.2 % (ref 0–1.5)
DEPRECATED RDW RBC AUTO: 40.6 FL (ref 37–54)
EOSINOPHIL # BLD AUTO: 0.01 10*3/MM3 (ref 0–0.4)
EOSINOPHIL NFR BLD AUTO: 0 % (ref 0.3–6.2)
ERYTHROCYTE [DISTWIDTH] IN BLOOD BY AUTOMATED COUNT: 12.5 % (ref 12.3–15.4)
HCT VFR BLD AUTO: 44.6 % (ref 34–46.6)
HGB BLD-MCNC: 14.4 G/DL (ref 12–15.9)
IMM GRANULOCYTES # BLD AUTO: 0.34 10*3/MM3 (ref 0–0.05)
IMM GRANULOCYTES NFR BLD AUTO: 1.7 % (ref 0–0.5)
LYMPHOCYTES # BLD AUTO: 2.19 10*3/MM3 (ref 0.7–3.1)
LYMPHOCYTES NFR BLD AUTO: 10.8 % (ref 19.6–45.3)
MCH RBC QN AUTO: 28.3 PG (ref 26.6–33)
MCHC RBC AUTO-ENTMCNC: 32.3 G/DL (ref 31.5–35.7)
MCV RBC AUTO: 87.8 FL (ref 79–97)
MONOCYTES # BLD AUTO: 1.41 10*3/MM3 (ref 0.1–0.9)
MONOCYTES NFR BLD AUTO: 7 % (ref 5–12)
NEUTROPHILS NFR BLD AUTO: 16.27 10*3/MM3 (ref 1.7–7)
NEUTROPHILS NFR BLD AUTO: 80.3 % (ref 42.7–76)
NRBC BLD AUTO-RTO: 0 /100 WBC (ref 0–0.2)
PLATELET # BLD AUTO: 197 10*3/MM3 (ref 140–450)
PMV BLD AUTO: 9.7 FL (ref 6–12)
RBC # BLD AUTO: 5.08 10*6/MM3 (ref 3.77–5.28)
WBC NRBC COR # BLD: 20.27 10*3/MM3 (ref 3.4–10.8)

## 2022-06-14 PROCEDURE — 85025 COMPLETE CBC W/AUTO DIFF WBC: CPT | Performed by: INTERNAL MEDICINE

## 2022-06-14 PROCEDURE — 36415 COLL VENOUS BLD VENIPUNCTURE: CPT

## 2022-06-14 PROCEDURE — 99214 OFFICE O/P EST MOD 30 MIN: CPT | Performed by: INTERNAL MEDICINE

## 2022-06-20 ENCOUNTER — OFFICE VISIT (OUTPATIENT)
Dept: GYNECOLOGIC ONCOLOGY | Facility: CLINIC | Age: 42
End: 2022-06-20

## 2022-06-20 ENCOUNTER — OFFICE VISIT (OUTPATIENT)
Dept: ONCOLOGY | Facility: CLINIC | Age: 42
End: 2022-06-20

## 2022-06-20 ENCOUNTER — LAB (OUTPATIENT)
Dept: LAB | Facility: HOSPITAL | Age: 42
End: 2022-06-20

## 2022-06-20 VITALS
HEART RATE: 99 BPM | BODY MASS INDEX: 20.25 KG/M2 | OXYGEN SATURATION: 99 % | SYSTOLIC BLOOD PRESSURE: 125 MMHG | WEIGHT: 118.6 LBS | HEIGHT: 64 IN | DIASTOLIC BLOOD PRESSURE: 77 MMHG | RESPIRATION RATE: 18 BRPM

## 2022-06-20 VITALS
HEIGHT: 64 IN | WEIGHT: 119 LBS | TEMPERATURE: 97.3 F | RESPIRATION RATE: 16 BRPM | HEART RATE: 107 BPM | SYSTOLIC BLOOD PRESSURE: 120 MMHG | BODY MASS INDEX: 20.32 KG/M2 | OXYGEN SATURATION: 100 % | DIASTOLIC BLOOD PRESSURE: 87 MMHG

## 2022-06-20 DIAGNOSIS — N83.8 OVARIAN MASS: Primary | ICD-10-CM

## 2022-06-20 DIAGNOSIS — D68.61 ANTIPHOSPHOLIPID ANTIBODY SYNDROME: ICD-10-CM

## 2022-06-20 DIAGNOSIS — D68.61 ANTIPHOSPHOLIPID ANTIBODY SYNDROME: Primary | ICD-10-CM

## 2022-06-20 LAB
BASOPHILS # BLD AUTO: 0.04 10*3/MM3 (ref 0–0.2)
BASOPHILS NFR BLD AUTO: 0.2 % (ref 0–1.5)
DEPRECATED RDW RBC AUTO: 41.7 FL (ref 37–54)
EOSINOPHIL # BLD AUTO: 0 10*3/MM3 (ref 0–0.4)
EOSINOPHIL NFR BLD AUTO: 0 % (ref 0.3–6.2)
ERYTHROCYTE [DISTWIDTH] IN BLOOD BY AUTOMATED COUNT: 12.8 % (ref 12.3–15.4)
HCT VFR BLD AUTO: 44.3 % (ref 34–46.6)
HGB BLD-MCNC: 14.3 G/DL (ref 12–15.9)
IMM GRANULOCYTES # BLD AUTO: 0.28 10*3/MM3 (ref 0–0.05)
IMM GRANULOCYTES NFR BLD AUTO: 1.7 % (ref 0–0.5)
LYMPHOCYTES # BLD AUTO: 0.86 10*3/MM3 (ref 0.7–3.1)
LYMPHOCYTES NFR BLD AUTO: 5.3 % (ref 19.6–45.3)
MCH RBC QN AUTO: 28.6 PG (ref 26.6–33)
MCHC RBC AUTO-ENTMCNC: 32.3 G/DL (ref 31.5–35.7)
MCV RBC AUTO: 88.6 FL (ref 79–97)
MONOCYTES # BLD AUTO: 0.38 10*3/MM3 (ref 0.1–0.9)
MONOCYTES NFR BLD AUTO: 2.3 % (ref 5–12)
NEUTROPHILS NFR BLD AUTO: 14.7 10*3/MM3 (ref 1.7–7)
NEUTROPHILS NFR BLD AUTO: 90.5 % (ref 42.7–76)
NRBC BLD AUTO-RTO: 0 /100 WBC (ref 0–0.2)
PLATELET # BLD AUTO: 111 10*3/MM3 (ref 140–450)
PLATELETS.RETICULATED NFR BLD AUTO: 4 % (ref 0.9–6.5)
PMV BLD AUTO: 9.3 FL (ref 6–12)
RBC # BLD AUTO: 5 10*6/MM3 (ref 3.77–5.28)
WBC NRBC COR # BLD: 16.26 10*3/MM3 (ref 3.4–10.8)

## 2022-06-20 PROCEDURE — 36415 COLL VENOUS BLD VENIPUNCTURE: CPT

## 2022-06-20 PROCEDURE — 99214 OFFICE O/P EST MOD 30 MIN: CPT | Performed by: PHYSICIAN ASSISTANT

## 2022-06-20 PROCEDURE — 99214 OFFICE O/P EST MOD 30 MIN: CPT | Performed by: INTERNAL MEDICINE

## 2022-06-20 PROCEDURE — 85055 RETICULATED PLATELET ASSAY: CPT

## 2022-06-20 PROCEDURE — 85025 COMPLETE CBC W/AUTO DIFF WBC: CPT

## 2022-06-20 NOTE — PROGRESS NOTES
Subjective .  Patient not having substantial side effects from steroid therapy, surgery discussed with Dr. Craven with patient currently acceptable platelet count.    REASONS FOR FOLLOWUP:    1. Idiopathic thrombocytopenic purpura.   2. Variable thrombocytopenia including platelet clumping. ?Gestational thrombocytopenia noted when reviewed 01/16/2012 and 02/14/2012.   3. Patient seen 01/16/2012 when 16 weeks pregnant.   4. Patient seen 05/01/2012 nearing term. Platelet count approximately 80,000.   5. Patient seen 01/24/2014, further thrombocytopenia, now associated with increasing joint pain-polyarticular joint garo n, rheumatologic assessment initiated, consult requested, and prednisone initiated 20 mg oral daily.   6. Patient status post rheumatologic assessment and results pending, platelet count responsive to oral steroids, reduced; steroid taper planned.   7. Patient seen on 02/21/2014, platelet count approximately 70,000 on 5 mg every other day per prednisone. Rheumatologic assessment ongoing. SLA(?). Mild increase in antiphospholipid antibody levels.   8. The patient seen on 06/13/2014, Plaquenil initiated for apparel sy stemic lupus erythematosus associated elevated antiphospholipid antibody, further thrombocytopenia noted in our office and to restart steroids over a 2 week period.   9. The patient seen 07/14/2014 - improvement per generalized rheumatologic symptoms from cyto penia persisting, Plaquenil continued. Recheck over the subsequent 2 months planned.   10. Patient seen 11/10/2014, platelet count approximately 50,000, pending stabilization of rheumatologic symptoms. Reassessment in 4 months planned.   11. Patient was seen on 03 /23/2015, platelet count between 40,000 and 50,000, again with stabilization of rheumatologic symptoms, reassessment in 6 months' time.   12. Patient seen 09/16/2015, platelet count of 92,000, improvement of rheumatologic symptoms, every 6 month assessments pl anned.    13. Patient seen June 01, 2016 stable, platelet count 1 or 16,000 additional rheumatologic symptoms controlled?  Length of Imuran use  14. Patient reviewed December 5, off Imuran for approximate 6 months, anticardiolipin antibodies, lupus anticoagulant, beta-2 glycoprotein antibodies rechecked  15. Patient reviewed May 31, 2017 clinically stable, platelet count acceptable, yearly follow-up planned  16. Patient reassessed 5/31/22 with bilateral ovarian cysts, plans for surgical intervention per gynecologic oncology, reassessment per antiphospholipid antibody syndrome, steroids initiated with prednisone 1 mg/kg  17. Patient seen in follow-up 6/14/2022 responding to p.o. steroids, adjusted as needed.  18. Patient seen 6/20/2022, acceptable platelet count, surgery planned 7/1/2022                The patient returns today for follow-up.  She continues on prednisone 30 mg daily.  She is tolerating prednisone well aside from occasional heartburn.  Heartburn typically occurs with meals, which is also when she has been taking her prednisone.  She currently does not take anything for heartburn but we discussed adding Pepcid to her therapy.  She also experiences occasional flushing, maybe once daily.  She is eating and drinking adequately, her weight remains stable.  Her bowels are moving regularly.  She denies insomnia.  She denies fever or chills.  She denies signs or symptoms of bleeding.  She denies nausea or vomiting.     She was seen by Dr. Craven today in advance for surgery scheduled 7/1/2022.  Her platelet count is currently adequate for the procedure the platelets will be available perioperatively.  The case was discussed with her surgeon today just before the patient being seen in office.      HEMATOLOGY HISTORY:  The patient is now a 42-year-old female with a history of ITP and variable thrombocytopenia also associated with platelet clumping and gestational thrombocytopenia. The patient was seen during her  pregnancy 2012,  and again 2012 at approximately 27 weeks, platelet count 70,000-80,000. She was admitted to [facility] with a platelet count of 1000 along with red purpura and petechiae. She has a history of thrombocytopenia dating to  when she was pregnant with a platelet count in the 50,000 range. She was also treated throughout her pregnancy with Lovenox at prophylactic doses due to prior miscarriages. She tolerated this well with no bleeding complications, platelet count appar ently spontaneously improved to the 80,000 range, eventually was 95,000 at the time of .   Approximately 10 years prior to hospitalization here at [facility], she had an area of folliculitis in the left outer aspect of labia requi ring incision and drainage treated empirically with antibiotics with Bactrim and doxycycline which she received over 10 days.   On the morning of admission she noted development of diffuse erythematous rash on her abdomen and back as well as lower extremities and buccal mucosa. She presented to Dr. Lee in [place] and was found to have a white count of 5000, hemoglobin 14.2 a n d platelet count of 1000. She was transferred to [facility] for admission and treatment for presumed ITP. Admitting studies include a CRP of .6, ANTONI 1:80 positive, , IPF 31.8, initial CBC with platelet count of 2000, H and H 13.9 , 40.7, WBC 4140. She was placed on Decadron orally and IVIG. Sedimentation rate was found to be 8 and on the  IPF was still 31.7. AFO screening was also positive incidentally and CMV antibody IgG was greater than 13.2, IgM .28, EBV antibodies were a lso considerably high, EBV antibody/nuclear antigen greater than 600 and EBV antibodies ECA IgG of 450 with an EBV antibody early IgG of 17. All of these are excessively high. The patient did fortunately respond however, by the  platelet count was u p to  84,000, IPF 6.7. It was elected at this point for the patient to be discharged home and followed back in the office with weekly counts and tapered steroids thereafter.   She had her counts done each week including 12/13/2010 at which time she was 201 ,000 and when seen on 12/20/2010, platelet count was 136,000. In reviewing her circumstances on that day, she went up to 30 mg of steroids and had her cut back very slowly on a weekly basis.   She returned on 01/17/2011, feeling well. Her IPF is also at normal levels today. Additional studies have been done to be certain that she does have additional hypercoagulable state as well including lupus anticoagulant including lipid antibody screening. She has had both Pneumovax and meningococcal vaccine but no Haemophilus.   The patient presented back to the office 02/14/2011 after tapering off steroids and has been off of them for 1 week. Fortunately she remained relatively stable with platelet count 116,000 and we elected to follow her on an every other we ek basis. Fortunately her subsequent counts have been stable. She has been able to complete her vaccinations as necessary. Her subsequent testing here has shown platelets in the 205,000 range on 03/28/2011, 168,000 on 04/11/2011 and 148 on 04/18/2011. She continues to feel well otherwise and has agreed that she would have monthly checks for a period of time longer for at least a 6 month period from her hospitalization.   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 She may have had a viral illness as of late. Her counts checked 05/17/2011 with a platelet count of 198,000, 06/13/2011 of 138,000. As she returns it i s clear that her platelet count is actually lower into the near 90,000 range. We discussed this in part as possibly not necessarily related to ICP since her IPF is actually  quite low and her smears do not show enlarged platelets.   As a result of the ab ove the patient was asked to have counts done closer to home and these were done at every two week intervals with a considerable variation seen. This includes on 08/03 of 146,000, 08/10 of 168,000, 08/17 of 113,000, 8/24 of 96,000 and today 106,000 here in the office. As a result of these findings in its variability thereof suggests that perhaps the patient has platelet clumping ongoing, and today we will test her for this as well.   The patient thereafter continued her usual lifestyle. [facility] for her to try to become pregnant and she did quite quickly do so. Thereafter she now sees Dr. Vic Lock, high-risk obstetrics, and her platelet count checked there approximately every other week has been dropping. She is now seen back in our office and actually has a platelet count of 56,000 with an IPF of 7. A peripheral smear is currently pending.   As result of review 01/16/2012 the patient had multifactorial reasons for her thrombocytopenia including ITP, likely additional pl atelet clumping, and? gestational thrombocytopenia. In any case, she was reasonably stable at that point and we elected to follow her counts every other week. She returns back today having done this. She was also placed on aspirin by her high-risk OB a n d she is having increasing gum bleeding likely as a result of several reasons now. Her most recent platelet counts including 84,000 on 02/06/2012 and 68,000 when seen today, 02/14/2012. IPF interestingly is 4.7. Her pregnancy has otherwise gone well wi th no additional issues thus far. She is approximately 20 weeks' gestation.   The patient was asked to be checked at her family physicians office. She also followed up with her high risk OB and has platelet count ranging from 60,000 to as much as 80,000. Today when seen her platelet count is 79,000, IPF of 6.5. I discussed with Dr. Krishna  Ashvin her high risk OB and plans were to try to hold off steroids unless she is below 50,000 wherein she would also discontinue aspirin. When seen today the pregn leanne has progressed nicely without any complication thus far. It is not clear if her delivery date is to be scheduled, but one expects that it might well be.   The patient's case was discussed with Dr. Tomi Lock with plans for her to again hold off steroids until she is below 50,000 at which time she will discontinue aspirin.   When seen again on 2012, pregnancy has progressed nicely and she is to see Dr. Tomi Lock in approximately 2 weeks. It is likely that she will undergo .   The patient did proceed onto steroids just prior to her pregnancy and fortunately did quite well peripartum. The patient had not been seen since that time approximately a year when she is now seen back 2013. She had presented to her PCP' s office jus t recently with abdominal pain, slowly worsening without nausea, vomiting or change in bowel habits. She underwent CT scan of the abdomen, pelvis without abnormalities noted though her blood count was somewhat suspicious with an elevated white count to 18 , 000 and platelet count down to 33,000. Normal hemoglobin and hematocrit 14.3 and 41.97. Patient rechecked two days later with platelet count of 43,000 and white count apparently normal? She now presents back to our practice for reassessment feeling much b tereza per her abdominal pain with a normal performance status, otherwise no change in bowel function.   The patient thereafter was asked to be seen back and now presents 2014. She has been noticing in the last several weeks to months, pain in multiple joints including right hip, left shoulder and the bilateral knees. She notes a general stiffness throughout multiple joints in the a.m. as well. This is a new finding for her and she is quite troubled by it. She was undergoing assessment for it  recently with additional laboratory studies done including 10/10/2013 with a platelet count found to be reduced to 86,000.   Additional laboratory results done also in [place] in June 2013 include normal serum chemistries; platelet count now is 53,000, WBC 8700, he moglobin and hematocrit 14.6, and 43.6. ANTONI titer positive, RA rheumatoid factor of 7, ASO of 137, uric acid 3.7. The patient was thereafter referred back to our practice for her thrombocytopenia. She feels well today except for again a degree of joint d iscomfort in the distribution as described above.   The patient as a result of the above and suspicions for rheumatologic disease was asked to undergo a series of studies. This included an ANTONI comprehensive panel that revealed anti-double-stranded DNA elevated at 21. Additionally, her RA panel suggested e a rly rheumatoid arthritis with a positive anti-CCP. As the patient's studies with Dr. Gonzáles however were not yet completed at the time she is seen back in our office. The patient at this point had continued steroids though when seen back in the office 01/3 1 /2014 her platelet count was 199,000. She was asked to taper prednisone down to 10 mg daily and when seen 02/07/2014 her platelet count is 89,000. The patient feels well overall today when seen. The joint discomfort has improved remarkably on the steroi d doses.   Patient seen on 02/21/2014 platelet count approximately 70,000 on 5 mg every other day per prednisone. Rheumatologic assessment ongoing mild increase in antiphospholipid antibody level.   The patient, after last being seen, was asked to drop predn isone down to 5 mg daily and then 5 mg every other day. She is now seeing Dr. Minoo Gonzáles who is assessing her as well and we sent additional laboratory studies, demonstrating mild elevation in antiphospholipid antibodies as well as glycoprotein antibody . It was also noted that her TTP level was up to 63. Ms. Glass indicates that she is  being considered for potentially lupus as an underlying diagnosis. Dr. Gonzáles would like to check her off steroids and thus we discussed tapering off further at this po int with platelet count is now reasonably acceptable.   The patient thereafter came off steroids and has been doing relatively well. As she now however, presents back it is recognized that her platelet count has been slowly dropping. This includes the 05/27 /2014 assessment with a platelet count of 30,000 with assessment on 06/11/2014 at 25,000 and today when seen on 06/13/2014 the platelet count is 20,000 with IPF 14.5. The patient states she is having increasing joint pain but has had no evidence of ecchym oses or petechial lesions and no additional gingival bleeding, epistaxis or vaginal bleeding. She in fact feels reasonably good except for the joint pain that she feels is manageable. She is, however, concerned about her platelet count and rightly so.   The patient thereafter was started on Plaquenil through Rheumatology and over the next several weeks, she had felt somewhat better. In fact when she is seen back today, 07/14/2014, she no longer has joint discomfort. She does have thrombocytopenia, however, a t approximately the same levels as she did previously. She had been on steroids in the last several weeks as they were tapered off. This was revealed when her platelet count was 23,000 three weeks ago and is recognized today. Today, however, her platelet count is 28,000. She has had no additional bleeding issues however, since last seen.   The patient, after being reviewed in July 2014, was asked to followup with plans for her to remain on Plaquenil, initiate Imuran which began in August. She stayed on th e medication, fortunately tolerating it well and as she was seen back today, 11/17/2014, overall feels as if she is having fewer rheumatologic symptoms. Unfortunately, hematologically, she remains [id number] with her platelet count at  close to 60, 000.   The patient thereafter has followed up with rheumatology on an every 2 month basis and now returned back here 4 months from previous on 03/23/2015. Her symptoms for her rheumatologic disorder remains under control though her Imuran has been moved to 150 mg a day as well as her Plaquenil, maintained at the same dose. She feels reasonably good overall today without any new symptoms, but concerned about her platelet count that was close to 40,000 when reviewed today. The patient has had no bleeding in g ums, epistaxis, GI or  tract.   The patient is seen back in followup and indicates that her rheumatologic symptoms remain under control with Imuran and Plaquenil. We find wonderfully enough that her platelet count is also improved to approximately 100,000 when seen in the office today as well.   Patient is now next reviewed June 01, 2016.  She is feeling well overall without additional rheumatologic symptoms.  She has been on Imuran for approximately 2 years and is a question of how long she'll need to stay on it.  She fortunately is hematologically stable when reviewed today platelet count of 116,000, and otherwise normal CBC     The patient is now reviewed December 50,016.  As a result of running out of her Imuran not having it refilled by a rheumatologist she has remained off Imuran.  She, fortunately, has not had any additional symptoms including rash development and/or joint discomfort.  We discussed rechecking her anticardiolipin antibodies, beta-2 glycoprotein antibodies and lupus anticoagulant at this point since she has a further degree of thrombocytopenia also present today.    The patient's subsequent laboratory studies were otherwise negative and she is now seen back May 31, 2017.  She continues to feel well without any joint disturbance but she does note bilateral malar rash which she tends to cover with makeup.    The patient presented February 18, 2022 to [facility] for preventive  examination at this point having fairly heavy periods-?  Ablation or hysterectomy.Subsequent CBC including H&H of 13.3 and 41.4 white count of 9200, MCV 82.8, MCH of 26.6 and platelet count of 68,000, normal automated differential. Subsequent pelvic ultrasound revealed homogenous appearance to endometrium per uterus, endometrial borders well-defined, no intracavitary mass, normal endometrial thickness, normal cervix, right ovary normal, left ovary of 2 cysts 1 at just over 60 mm and possible endometrioma and 1 at just over 30 mm also possible endometrioma.  Unfortunately by late April 2022 her degree of menorrhagia was worsening and she was referred to Dr. Craven who felt she should undergo a laparoscopic hysterectomy, bilateral salpingo-oophorectomy unilateral oophorectomy and possible staging possible laparotomy.  She is now referred back to try to improve her platelet count prior to any surgical procedure.     The patient is seen 5/31/2022 indicating that she has been routinely assessed by rheumatology at every 6 months while she is remained on Plaquenil undergoing laboratory studies and ophthalmologic assessments every 6 months.  She has had no issues until recently indicating her platelet count has been between 65 and 80,000.  She understands the above issues and the need to try to bring her platelet count up prior to surgical intervention by Dr. Craven.  She has had no symptoms, however, of progressive rheumatologic disease including lack of Raynaud's, further malar rash, joint discomfort, shortness of breath, cough, abdominal pain, change in bowel habit, joint swelling or neuropathy.  Additionally she has had no pelvic discomfort.    Patient responded quickly to p.o. steroids at 1 mg/kg with repeat CBC 6/7/2022 and platelet count of 273,000, IPF now reduced to 3.6, previous  level 90.1.  Patient was dropped to 40 mg daily next seen 6/14/2022 with platelet count 197,000, prednisone dropped to 30 mg  daily and plans to reassess 6/20/2022.    The patient seen 6/20/2022 feeling well, platelet count at 111,000, IPF at 4.0.  Her surgery is now scheduled 7/1/2022 we plan to continue her current dosing check and her platelet count preoperatively 6/29/2022.    Past Medical History:   Diagnosis Date   • Cytopenia    • Endometriosis 2006   • Erythematous rash     ON ABDOMEN, BACK AND LOWER EXTREMITIES   • Gestational thrombocytopenia (HCC)    • H/O idiopathic thrombocytopenic purpura    • Hx of folliculitis     IN THE LEFT OUTER ASPECT OF LABIA   • ITP (idiopathic thrombocytopenic purpura)     INCLUDING PLATELET CLUMPING   • Joint pain    • Lupus (HCC)    • Thrombocytopenia (HCC)      ONCOLOGIC HISTORY:  (History from previous dates can be found in the separate document.)    Current Outpatient Medications on File Prior to Visit   Medication Sig Dispense Refill   • cetirizine (zyrTEC) 10 MG tablet Take  by mouth Daily.     • hydroxychloroquine (PLAQUENIL) 200 MG tablet Take  by mouth daily.     • multivitamin with minerals tablet tablet Take 1 tablet by mouth Daily.     • predniSONE (DELTASONE) 20 MG tablet Take 1 tablet by mouth Daily With Breakfast, Lunch & Dinner. 60 tablet 1     No current facility-administered medications on file prior to visit.     ALLERGIES:     Allergies   Allergen Reactions   • Bactrim [Sulfamethoxazole-Trimethoprim]        Social History     Socioeconomic History   • Marital status:    Tobacco Use   • Smoking status: Never Smoker   Substance and Sexual Activity   • Alcohol use: No   • Drug use: No         Cancer-related family history includes Testicular cancer in her brother. There is no history of Cancer.     Review of Systems  A comprehensive 14 point review of systems was performed and was negative except as mentioned.    Objective     Vitals:    06/20/22 1223   BP: 120/87   Pulse: 107   Resp: 16   Temp: 97.3 °F (36.3 °C)   TempSrc: Temporal   SpO2: 100%   Weight: 54 kg (119 lb)  "  Height: 162 cm (63.78\")   PainSc: 0-No pain     Current Status 6/20/2022   ECOG score 0     Physical Exam    GENERAL: Well-developed, well-nourished female in no acute distress.   SKIN: Warm, dry without rashes, purpura or petechiae.   HEAD: Normocephalic.   EYES: Pupils equal, round and reactive to light. EOMs intact. Conjunctivae normal.   EARS: Hearing intact.   NOSE: Septum midline. No excoriations or nasal discharge.   MOUTH: Tongue is well-papillated; no stomatitis or ulcers. Lips normal.   THROAT: Oropharynx with occasional whitish plaque?  Early thrush  NECK: Supple with good range of motion; no thyromegaly or masses, no JVD or bruits.   LYMPHATICS: No cervical, supraclavicular, axillary or inguinal adenopathy.   CHEST: Lungs clear to percussion and auscultation.   CARDIAC: Regular rate and rhythm without murmurs, rubs or gallops.   ABDOMEN: Soft, nontender with no organomegaly or masses.   EXTREMITIES: No clubbing, cyanosis or edema.   NEUROLOGICAL: No focal neurological deficits.   RECENT LABS:  Hematology WBC   Date Value Ref Range Status   06/20/2022 16.26 (H) 3.40 - 10.80 10*3/mm3 Final     RBC   Date Value Ref Range Status   06/20/2022 5.00 3.77 - 5.28 10*6/mm3 Final     Hemoglobin   Date Value Ref Range Status   06/20/2022 14.3 12.0 - 15.9 g/dL Final     Hematocrit   Date Value Ref Range Status   06/20/2022 44.3 34.0 - 46.6 % Final     Platelets   Date Value Ref Range Status   06/20/2022 111 (L) 140 - 450 10*3/mm3 Final        Assessment & Plan   *ITP  · 42-year-old female with a history of idiopathic thrombocytopenic purpura during previous pregnancy.  · In addition, she has had a degree of pseudothrombocytopenia noted on periodic blood smear and likely a component of gestational thrombocytopenia previously.   · She had more recently a relapse of ITP, improved on steroid dosing. Her symptoms suggested she be tested further for rheumatologic disorder. She was seen by Dr. Gonzáles who diagnosed systemic " lupus erythematosus, which evidently includes a component of antiphospholipid antibody syndrome as well.   · The patient was placed on Plaquenil and later Imuran, to which she continues to respond.   · Her platelet count had been noted to improved somewhat when she was seen in June of this year.    · Now which is reviewed in December she continues to do well clinically though her platelet count has dropped modestly with a very minimally elevated IPF.  It was suggested rechecking her anticardiolipin antibodies, beta-2 glycoprotein antibodies and lupus anticoagulant today while she remained off Imuran.  These studies were negative.  · The patient presented February 18, 2022 to [facility] for preventive examination at this point having fairly heavy periods-?  Ablation or hysterectomy.  Subsequent CBC including H&H of 13.3 and 41.4 white count of 9200, MCV 82.8, MCH of 26.6 and platelet count of 68,000, normal automated differential.   · Unfortunately by late April 2022 her degree of menorrhagia was worsening and she was referred to Dr. Craven who felt she should undergo a laparoscopic hysterectomy, bilateral salpingo-oophorectomy unilateral oophorectomy and possible staging possible laparotomy.  She is now referred back to try to improve her platelet count prior to any surgical procedure.  · The patient is seen 5/31/2022.  She has had no issues until recently indicating her platelet count has been between 65 and 80,000.  She was initiated on prednisone 20 mg 3 times daily (40 mg at breakfast, 20 mg in the afternoon).  · 6/7/2022, platelet count today has significantly improved, now normal at 273,000.  Discussed with Dr. Eastman, patient will be cleared from our standpoint for surgery with Dr. Craven.  Because she responded so well to prednisone, we will reduce dose to 40 mg.  She will return in 1 week for CBC and MD follow-up to potentially continue to taper prednisone.  · Patient seen 6/14/2022 with platelet count  of 197,000, prednisone dropped to 30 mg daily, follow-up assessment 6/20/2022 same day as gynecologic oncology.  · Patient assessed 6/20/2022 with platelet count of 1 11,000, normal IPF, prednisone 30 mg daily continued    *Systemic lupus erythematosus  · She was seen by Dr. Gonzáles who diagnosed systemic lupus erythematosus, which evidently includes a component of antiphospholipid antibody syndrome as well.   · The patient was placed on Plaquenil and later Imuran, to which she continues to respond.   · It was suggested rechecking her anticardiolipin antibodies, beta-2 glycoprotein antibodies and lupus anticoagulant today while she remained off Imuran.  These studies were negative.  · She continues to follow with rheumatology every 6 months while she is on Plaquenil.  She undergoes every 6-month ophthalmologic assessment and lab assessment.    *Menorrhagia  · The patient presented February 18, 2022 to [facility] for preventive examination at this point having fairly heavy periods-?  Ablation or hysterectomy.  · Subsequent pelvic ultrasound revealed homogenous appearance to endometrium per uterus, endometrial borders well-defined, no intracavitary mass, normal endometrial thickness, normal cervix, right ovary normal, left ovary of 2 cysts 1 at just over 60 mm and possible endometrioma and 1 at just over 30 mm also possible endometrioma.    · Unfortunately by late April 2022 her degree of menorrhagia was worsening and she was referred to Dr. Craven who felt she should undergo a laparoscopic hysterectomy, bilateral salpingo-oophorectomy unilateral oophorectomy and possible staging possible laparotomy.  She is now referred back to try to improve her platelet count prior to any surgical procedure.  · Ca1 25 elevated at 90.1 indicating that patient needs to have cysts removed.  Cyst themselves can elevate this marker.  We will work to improve platelet count so patient can have procedure.  · Patient will now be cleared  for surgery from oncology standpoint, his platelets have returned to normal at 273,000.  · Scheduled for surgery 7/20/2022    Plan:   · Continue prednisone to 30 mg daily.  · Start Pepcid 20 mg for heartburn while on prednisone.  · Nystatin 5 cc p.o. 4 times daily E scribed to pharmacy the patient asked to start swish and spit twice daily  · Patient is cleared from oncology standpoint to proceed with surgery with Dr. Craven who the case was discussed with directly 6/20/2022.  · Follow-up MD assessment 7/11/2022 with tapering her steroids planned, though we may be asked to see her during her hospitalization.  Jerad Eastman MD  06/20/22

## 2022-06-20 NOTE — PROGRESS NOTES
Age: 42 y.o.  Sex: female  :  1980  MRN: [id number]       REFERRING PHYSICIAN: No ref. provider found  DATE OF VISIT: 2022        Regi Glass presents to [facility] for evaluation and management of left ovarian cyst. TVUS shows uterus 10cm, ET 8mm, Right ovary 1cm, left ovary 7.5cm. She has no bloating or pelvic pain. No family history of breast or ovarian cancer.  Pt with known h/o ITP, platelets usually around 60k. Sees Dr Eastman. +SLE/antiphoscpholipid Ab.    She was advised after last visit on 22 to follow up with Dr. Eastman to boost platelet count prior to surgery.  Pt placed on prednisone- responded well, currently taking Prednisone 40 mg daily.  Platelets within normal range- most recently 197 on 22.  She has been cleared for surgery from oncology perspective.        Oncology/Hematology History Overview Note   Regi Glass is a 42 y.o. female referred by Dr. Olga Mello ([facility]) for left ovarian cysts.    • 22: Pap smear-negative  • 22: TVUS-uterus-97 x 60 x 72 mm, ET-8.9 mm, right ovary-28 x 15 x 16 mm, left ovary-66 x 55 x 45 mm, cyst-31 x 17 x 29 mm, cyst-46 x 40 x 43 mm.  • 22: TVUS-uterus-107 x 62 x 62 mm, ET-8.8 mm, right ovary-20 x 15 x 17 mm, left ovary-75 x 70 x 52 mm, cyst-61 x 53 x 47 mm, cyst-30 x 30 x 24 mm.    22: exam prior to surgery       Date Value Ref Range Status   2022 90.1 (H) 0.0 - 38.1 U/mL Final            Past Medical History:  Past Medical History:   Diagnosis Date   • Cytopenia    • Endometriosis    • Erythematous rash     ON ABDOMEN, BACK AND LOWER EXTREMITIES   • Gestational thrombocytopenia (HCC)    • H/O idiopathic thrombocytopenic purpura    • Hx of folliculitis     IN THE LEFT OUTER ASPECT OF LABIA   • ITP (idiopathic thrombocytopenic purpura)     INCLUDING PLATELET CLUMPING   • Joint pain    • Lupus (HCC)    • Thrombocytopenia (HCC)        Past Surgical History:  Past Surgical  "History:   Procedure Laterality Date   •  SECTION     • DIAGNOSTIC LAPAROSCOPY EXPLORATORY LAPAROTOMY      1.Status post exploratory laparoscopy diagnosed with endometriosis in 2006.   • INCISION AND DRAINAGE ABSCESS      FOLLICULITIS IN THE LEFT OUTER ASPECT OF LABIA        MEDICATIONS:    Current Outpatient Medications:   •  cetirizine (zyrTEC) 10 MG tablet, Take  by mouth Daily., Disp: , Rfl:   •  multivitamin with minerals tablet tablet, Take 1 tablet by mouth Daily., Disp: , Rfl:   •  predniSONE (DELTASONE) 20 MG tablet, Take 1 tablet by mouth Daily With Breakfast, Lunch & Dinner., Disp: 60 tablet, Rfl: 1  •  hydroxychloroquine (PLAQUENIL) 200 MG tablet, Take  by mouth daily., Disp: , Rfl:     ALLERGIES:  Allergies   Allergen Reactions   • Bactrim [Sulfamethoxazole-Trimethoprim]          ROS:  CONSTITUTIONAL:  Denies fever or chills.   NEUROLOGIC:  Denies headache, focal weakness or sensory changes.   EYES:  Denies change in visual acuity.  HEENT:  Denies nasal congestion or sore throat.   RESPIRATORY:  Denies cough or shortness of breath.   CARDIOVASCULAR:  Denies chest pain or edema.   GI:  Denies abdominal pain, nausea, vomiting, bloody stools or diarrhea.   :  Denies dysuria, leaking or incontinence.  MUSCULOSKELETAL:  Denies back pain or joint pain.   INTEGUMENT:  Denies rash.   ENDOCRINE:  Denies polyuria or polydipsia.   LYMPHATIC:  Denies swollen glands or lymphedema.   PSYCHIATRIC:  Denies depression or anxiety.      PHYSICAL EXAM:  Vitals:    22 1119   BP: 125/77   Pulse: 99   Resp: 18   SpO2: 99%   Weight: 53.8 kg (118 lb 9.6 oz)   Height: 162 cm (63.78\")   PainSc: 0-No pain     Body mass index is 20.5 kg/m².  Current Status 2022   ECOG score 0     PHQ-9 Total Score:         GEN: alert and oriented x 3, normal affect, well nourished and hydrated.  CARDIO: regular rate and rhythm.  PULM: Lungs CTA b.l, no RRW   ABD: Soft, nontender, nondistended.   GYN: External genitalia " normal, no lesions. Speculum with normal vagina, normal appearingcervix without lesions. Bimanual exam does not reveal any palpable lesions. +Left adnexal fullness   EXT: No petechiae, bruising, rash, candida. No CCE.       Result Review :  The pertinent labs, images, and/or pathology as noted in the oncology history were reviewed independently and discussed with the patient.   Aurora SAAB Herber, DO   05/02/2022    [facility]:   WBC   Date Value Ref Range Status   06/14/2022 20.27 (H) 3.40 - 10.80 10*3/mm3 Final     RBC   Date Value Ref Range Status   06/14/2022 5.08 3.77 - 5.28 10*6/mm3 Final     Hemoglobin   Date Value Ref Range Status   06/14/2022 14.4 12.0 - 15.9 g/dL Final     Hematocrit   Date Value Ref Range Status   06/14/2022 44.6 34.0 - 46.6 % Final     Platelets   Date Value Ref Range Status   06/14/2022 197 140 - 450 10*3/mm3 Final        Date Value Ref Range Status   05/31/2022 90.1 (H) 0.0 - 38.1 U/mL Final       [facility] IMAGING:  No radiology results for the last 90 days.          ASSESSMENT :  • Left ovarian cyst 7.5cm   • Thrombocytopenia (ITP)  o Platelets stable- most recently 197 on 6/14/22  o Prednisone 40 mg daily  o Followed by Dr. Eastman  o cleared for surgery from oncology perspective  • SLE / antiphospholipid Ab          PLAN :  Perioperative mgmt: platelets available in pre op   Med onc clearance - improve platelet counts as much as possible before surgery     The case was reviewed with the patient in detail, taking into consideration symptoms, laboratory results, imaging, pathology and physical exam findings.  At this point in time, I am recommending Exam under anesthesia, Robotic assisted total laparoscopic hysterectomy, bilateral salpingectomy, unilateral oophorecotmy, possible staging, possible laparotomy.       Risks, benefits, alternatives and indications to the procedure were reviewed in detail.    Risks of surgery include bleeding/hemorrhage which may require transfusion and  associated transfusion risk (transfusion reaction, HCV, TRALI ); Infection, which may require antibiotic therapy or abscess drainage; Damage to surrounding internal organs (bowel, bladder, or urinary tract) which may result in obstruction or fistula; Nerve, or vascular injury which may result in numbness, pain, swelling or loss of strength. Risk of possible incomplete resolution of symptoms or failure to cure.  She understands that it is possible that intraoperative findings may warrant further treatment.  There is a low, but real, risk of unanticipated return to the OR for a problem associated with the surgery and a remote possibility of death.       • All questions were addressed and answered to the patient's satisfaction. She indicates understanding of these risks and desires to proceed. She wishes me to use my judgement to do the procedure that I feel is in her best interest. Surgical consents were signed.              Aurora Craven D.O  6/20/2022    Gynecologic Oncology   [address] [place] [address]  [phone] office

## 2022-06-29 ENCOUNTER — PRE-ADMISSION TESTING (OUTPATIENT)
Dept: PREADMISSION TESTING | Facility: HOSPITAL | Age: 42
End: 2022-06-29

## 2022-06-29 ENCOUNTER — HOSPITAL ENCOUNTER (OUTPATIENT)
Dept: GENERAL RADIOLOGY | Facility: HOSPITAL | Age: 42
Discharge: HOME | End: 2022-06-29

## 2022-06-29 VITALS
HEART RATE: 110 BPM | DIASTOLIC BLOOD PRESSURE: 75 MMHG | OXYGEN SATURATION: 100 % | SYSTOLIC BLOOD PRESSURE: 130 MMHG | BODY MASS INDEX: 20.76 KG/M2 | TEMPERATURE: 98.9 F | HEIGHT: 64 IN | RESPIRATION RATE: 16 BRPM | WEIGHT: 121.6 LBS

## 2022-06-29 DIAGNOSIS — N83.8 OVARIAN MASS: ICD-10-CM

## 2022-06-29 LAB
ABO GROUP BLD: NORMAL
ANION GAP SERPL CALCULATED.3IONS-SCNC: 11 MMOL/L (ref 5–15)
APTT PPP: 21.9 SECONDS (ref 22.7–35.4)
BACTERIA UR QL AUTO: NORMAL /HPF
BASOPHILS # BLD AUTO: 0.04 10*3/MM3 (ref 0–0.2)
BASOPHILS NFR BLD AUTO: 0.3 % (ref 0–1.5)
BILIRUB UR QL STRIP: NEGATIVE
BLD GP AB SCN SERPL QL: NEGATIVE
BUN SERPL-MCNC: 9 MG/DL (ref 6–20)
BUN/CREAT SERPL: 12.9 (ref 7–25)
CALCIUM SPEC-SCNC: 9.1 MG/DL (ref 8.6–10.5)
CHLORIDE SERPL-SCNC: 101 MMOL/L (ref 98–107)
CLARITY UR: CLEAR
CO2 SERPL-SCNC: 28 MMOL/L (ref 22–29)
COLOR UR: YELLOW
CREAT SERPL-MCNC: 0.7 MG/DL (ref 0.57–1)
DEPRECATED RDW RBC AUTO: 37.4 FL (ref 37–54)
EGFRCR SERPLBLD CKD-EPI 2021: 110.9 ML/MIN/1.73
EOSINOPHIL # BLD AUTO: 0 10*3/MM3 (ref 0–0.4)
EOSINOPHIL NFR BLD AUTO: 0 % (ref 0.3–6.2)
ERYTHROCYTE [DISTWIDTH] IN BLOOD BY AUTOMATED COUNT: 12.6 % (ref 12.3–15.4)
GLUCOSE SERPL-MCNC: 169 MG/DL (ref 65–99)
GLUCOSE UR STRIP-MCNC: NEGATIVE MG/DL
HCG SERPL QL: NEGATIVE
HCT VFR BLD AUTO: 41.9 % (ref 34–46.6)
HGB BLD-MCNC: 14.3 G/DL (ref 12–15.9)
HGB UR QL STRIP.AUTO: ABNORMAL
HYALINE CASTS UR QL AUTO: NORMAL /LPF
IMM GRANULOCYTES # BLD AUTO: 0.2 10*3/MM3 (ref 0–0.05)
IMM GRANULOCYTES NFR BLD AUTO: 1.5 % (ref 0–0.5)
INR PPP: 0.89 (ref 0.9–1.1)
KETONES UR QL STRIP: NEGATIVE
LEUKOCYTE ESTERASE UR QL STRIP.AUTO: ABNORMAL
LYMPHOCYTES # BLD AUTO: 0.83 10*3/MM3 (ref 0.7–3.1)
LYMPHOCYTES NFR BLD AUTO: 6.2 % (ref 19.6–45.3)
MCH RBC QN AUTO: 28.7 PG (ref 26.6–33)
MCHC RBC AUTO-ENTMCNC: 34.1 G/DL (ref 31.5–35.7)
MCV RBC AUTO: 84.1 FL (ref 79–97)
MONOCYTES # BLD AUTO: 0.28 10*3/MM3 (ref 0.1–0.9)
MONOCYTES NFR BLD AUTO: 2.1 % (ref 5–12)
NEUTROPHILS NFR BLD AUTO: 11.96 10*3/MM3 (ref 1.7–7)
NEUTROPHILS NFR BLD AUTO: 89.9 % (ref 42.7–76)
NITRITE UR QL STRIP: NEGATIVE
NRBC BLD AUTO-RTO: 0 /100 WBC (ref 0–0.2)
PH UR STRIP.AUTO: 7.5 [PH] (ref 5–8)
PLATELET # BLD AUTO: 113 10*3/MM3 (ref 140–450)
PMV BLD AUTO: 9.9 FL (ref 6–12)
POTASSIUM SERPL-SCNC: 4.1 MMOL/L (ref 3.5–5.2)
PROT UR QL STRIP: NEGATIVE
PROTHROMBIN TIME: 12 SECONDS (ref 11.7–14.2)
QT INTERVAL: 312 MS
RBC # BLD AUTO: 4.98 10*6/MM3 (ref 3.77–5.28)
RBC # UR STRIP: NORMAL /HPF
REF LAB TEST METHOD: NORMAL
RH BLD: POSITIVE
SARS-COV-2 ORF1AB RESP QL NAA+PROBE: NOT DETECTED
SODIUM SERPL-SCNC: 140 MMOL/L (ref 136–145)
SP GR UR STRIP: 1.01 (ref 1–1.03)
SQUAMOUS #/AREA URNS HPF: NORMAL /HPF
T&S EXPIRATION DATE: NORMAL
UROBILINOGEN UR QL STRIP: ABNORMAL
WBC # UR STRIP: NORMAL /HPF
WBC NRBC COR # BLD: 13.31 10*3/MM3 (ref 3.4–10.8)

## 2022-06-29 PROCEDURE — 71045 X-RAY EXAM CHEST 1 VIEW: CPT

## 2022-06-29 PROCEDURE — 86850 RBC ANTIBODY SCREEN: CPT

## 2022-06-29 PROCEDURE — 85025 COMPLETE CBC W/AUTO DIFF WBC: CPT

## 2022-06-29 PROCEDURE — 86900 BLOOD TYPING SEROLOGIC ABO: CPT

## 2022-06-29 PROCEDURE — 86901 BLOOD TYPING SEROLOGIC RH(D): CPT

## 2022-06-29 PROCEDURE — 36415 COLL VENOUS BLD VENIPUNCTURE: CPT

## 2022-06-29 PROCEDURE — 80048 BASIC METABOLIC PNL TOTAL CA: CPT

## 2022-06-29 PROCEDURE — 84703 CHORIONIC GONADOTROPIN ASSAY: CPT

## 2022-06-29 PROCEDURE — C9803 HOPD COVID-19 SPEC COLLECT: HCPCS

## 2022-06-29 PROCEDURE — 93005 ELECTROCARDIOGRAM TRACING: CPT

## 2022-06-29 PROCEDURE — 93010 ELECTROCARDIOGRAM REPORT: CPT | Performed by: INTERNAL MEDICINE

## 2022-06-29 PROCEDURE — U0004 COV-19 TEST NON-CDC HGH THRU: HCPCS

## 2022-06-29 PROCEDURE — 85610 PROTHROMBIN TIME: CPT

## 2022-06-29 PROCEDURE — 81001 URINALYSIS AUTO W/SCOPE: CPT

## 2022-06-29 PROCEDURE — 85730 THROMBOPLASTIN TIME PARTIAL: CPT

## 2022-06-30 ENCOUNTER — TELEPHONE (OUTPATIENT)
Dept: GYNECOLOGIC ONCOLOGY | Facility: CLINIC | Age: 42
End: 2022-06-30

## 2022-06-30 NOTE — TELEPHONE ENCOUNTER
Received a call from Surgery and they said since this is a unilateral oophorecotmy they need the order to say which side it is for. I sent  and PA this message.

## 2022-07-01 ENCOUNTER — ANESTHESIA EVENT (OUTPATIENT)
Dept: PERIOP | Facility: HOSPITAL | Age: 42
End: 2022-07-01

## 2022-07-01 ENCOUNTER — TELEPHONE (OUTPATIENT)
Dept: GYNECOLOGIC ONCOLOGY | Facility: CLINIC | Age: 42
End: 2022-07-01

## 2022-07-01 ENCOUNTER — ANESTHESIA (OUTPATIENT)
Dept: PERIOP | Facility: HOSPITAL | Age: 42
End: 2022-07-01

## 2022-07-01 ENCOUNTER — HOSPITAL ENCOUNTER (OUTPATIENT)
Facility: HOSPITAL | Age: 42
Discharge: HOME | End: 2022-07-02
Attending: OBSTETRICS & GYNECOLOGY | Admitting: OBSTETRICS & GYNECOLOGY

## 2022-07-01 DIAGNOSIS — N83.8 OVARIAN MASS: ICD-10-CM

## 2022-07-01 LAB
BASOPHILS # BLD AUTO: 0.04 10*3/MM3 (ref 0–0.2)
BASOPHILS NFR BLD AUTO: 0.2 % (ref 0–1.5)
DEPRECATED RDW RBC AUTO: 38.3 FL (ref 37–54)
EOSINOPHIL # BLD AUTO: 0 10*3/MM3 (ref 0–0.4)
EOSINOPHIL NFR BLD AUTO: 0 % (ref 0.3–6.2)
ERYTHROCYTE [DISTWIDTH] IN BLOOD BY AUTOMATED COUNT: 12.2 % (ref 12.3–15.4)
HCT VFR BLD AUTO: 40.9 % (ref 34–46.6)
HGB BLD-MCNC: 13.2 G/DL (ref 12–15.9)
IMM GRANULOCYTES # BLD AUTO: 0.19 10*3/MM3 (ref 0–0.05)
IMM GRANULOCYTES NFR BLD AUTO: 1.1 % (ref 0–0.5)
LYMPHOCYTES # BLD AUTO: 0.68 10*3/MM3 (ref 0.7–3.1)
LYMPHOCYTES NFR BLD AUTO: 4 % (ref 19.6–45.3)
MCH RBC QN AUTO: 27.8 PG (ref 26.6–33)
MCHC RBC AUTO-ENTMCNC: 32.3 G/DL (ref 31.5–35.7)
MCV RBC AUTO: 86.1 FL (ref 79–97)
MONOCYTES # BLD AUTO: 0.34 10*3/MM3 (ref 0.1–0.9)
MONOCYTES NFR BLD AUTO: 2 % (ref 5–12)
NEUTROPHILS NFR BLD AUTO: 15.9 10*3/MM3 (ref 1.7–7)
NEUTROPHILS NFR BLD AUTO: 92.7 % (ref 42.7–76)
NRBC BLD AUTO-RTO: 0 /100 WBC (ref 0–0.2)
PLATELET # BLD AUTO: 99 10*3/MM3 (ref 140–450)
PMV BLD AUTO: 9.5 FL (ref 6–12)
RBC # BLD AUTO: 4.75 10*6/MM3 (ref 3.77–5.28)
WBC NRBC COR # BLD: 17.15 10*3/MM3 (ref 3.4–10.8)

## 2022-07-01 PROCEDURE — 25010000002 CEFAZOLIN IN DEXTROSE 2-4 GM/100ML-% SOLUTION: Performed by: OBSTETRICS & GYNECOLOGY

## 2022-07-01 PROCEDURE — 25010000002 ONDANSETRON PER 1 MG

## 2022-07-01 PROCEDURE — 88331 PATH CONSLTJ SURG 1 BLK 1SPC: CPT | Performed by: OBSTETRICS & GYNECOLOGY

## 2022-07-01 PROCEDURE — 25010000002 DEXAMETHASONE PER 1 MG

## 2022-07-01 PROCEDURE — 25010000002 FENTANYL CITRATE (PF) 50 MCG/ML SOLUTION

## 2022-07-01 PROCEDURE — 58571 TLH W/T/O 250 G OR LESS: CPT | Performed by: OBSTETRICS & GYNECOLOGY

## 2022-07-01 PROCEDURE — 88307 TISSUE EXAM BY PATHOLOGIST: CPT | Performed by: OBSTETRICS & GYNECOLOGY

## 2022-07-01 PROCEDURE — 85025 COMPLETE CBC W/AUTO DIFF WBC: CPT | Performed by: OBSTETRICS & GYNECOLOGY

## 2022-07-01 PROCEDURE — 25010000002 HYDROMORPHONE 1 MG/ML SOLUTION

## 2022-07-01 PROCEDURE — 25010000002 KETOROLAC TROMETHAMINE PER 15 MG: Performed by: OBSTETRICS & GYNECOLOGY

## 2022-07-01 PROCEDURE — G0378 HOSPITAL OBSERVATION PER HR: HCPCS

## 2022-07-01 PROCEDURE — 58571 TLH W/T/O 250 G OR LESS: CPT | Performed by: PHYSICIAN ASSISTANT

## 2022-07-01 PROCEDURE — 25010000002 PROPOFOL 10 MG/ML EMULSION

## 2022-07-01 PROCEDURE — 25010000002 HYDROMORPHONE PER 4 MG

## 2022-07-01 PROCEDURE — 25010000002 HYDROMORPHONE PER 4 MG: Performed by: OBSTETRICS & GYNECOLOGY

## 2022-07-01 PROCEDURE — 25010000002 HYDROCORTISONE SODIUM SUCCINATE 100 MG RECONSTITUTED SOLUTION

## 2022-07-01 PROCEDURE — 25010000002 MIDAZOLAM PER 1 MG: Performed by: ANESTHESIOLOGY

## 2022-07-01 DEVICE — SEALANT WND FIBRIN TISSEEL PREFIL/SYR/PRIMAFZ 10ML: Type: IMPLANTABLE DEVICE | Site: ABDOMEN | Status: FUNCTIONAL

## 2022-07-01 RX ORDER — LIDOCAINE HYDROCHLORIDE 10 MG/ML
0.5 INJECTION, SOLUTION EPIDURAL; INFILTRATION; INTRACAUDAL; PERINEURAL ONCE AS NEEDED
Status: DISCONTINUED | OUTPATIENT
Start: 2022-07-01 | End: 2022-07-01 | Stop reason: HOSPADM

## 2022-07-01 RX ORDER — ROCURONIUM BROMIDE 10 MG/ML
INJECTION, SOLUTION INTRAVENOUS AS NEEDED
Status: DISCONTINUED | OUTPATIENT
Start: 2022-07-01 | End: 2022-07-01 | Stop reason: SURG

## 2022-07-01 RX ORDER — PROMETHAZINE HYDROCHLORIDE 12.5 MG/1
12.5 TABLET ORAL EVERY 6 HOURS PRN
Status: DISCONTINUED | OUTPATIENT
Start: 2022-07-01 | End: 2022-07-02 | Stop reason: HOSPADM

## 2022-07-01 RX ORDER — NALOXONE HCL 0.4 MG/ML
0.1 VIAL (ML) INJECTION
Status: DISCONTINUED | OUTPATIENT
Start: 2022-07-01 | End: 2022-07-02 | Stop reason: HOSPADM

## 2022-07-01 RX ORDER — CEFAZOLIN SODIUM 2 G/100ML
2 INJECTION, SOLUTION INTRAVENOUS ONCE
Status: COMPLETED | OUTPATIENT
Start: 2022-07-01 | End: 2022-07-01

## 2022-07-01 RX ORDER — SODIUM CHLORIDE 9 MG/ML
100 INJECTION, SOLUTION INTRAVENOUS CONTINUOUS
Status: DISCONTINUED | OUTPATIENT
Start: 2022-07-01 | End: 2022-07-02 | Stop reason: HOSPADM

## 2022-07-01 RX ORDER — NALOXONE HCL 0.4 MG/ML
0.4 VIAL (ML) INJECTION
Status: DISCONTINUED | OUTPATIENT
Start: 2022-07-01 | End: 2022-07-01

## 2022-07-01 RX ORDER — SIMETHICONE 80 MG
120 TABLET,CHEWABLE ORAL
Status: DISCONTINUED | OUTPATIENT
Start: 2022-07-01 | End: 2022-07-02 | Stop reason: HOSPADM

## 2022-07-01 RX ORDER — DOCUSATE SODIUM 100 MG/1
100 CAPSULE, LIQUID FILLED ORAL 2 TIMES DAILY PRN
Status: DISCONTINUED | OUTPATIENT
Start: 2022-07-01 | End: 2022-07-02 | Stop reason: HOSPADM

## 2022-07-01 RX ORDER — FENTANYL CITRATE 50 UG/ML
INJECTION, SOLUTION INTRAMUSCULAR; INTRAVENOUS AS NEEDED
Status: DISCONTINUED | OUTPATIENT
Start: 2022-07-01 | End: 2022-07-01 | Stop reason: SURG

## 2022-07-01 RX ORDER — NALOXONE HCL 0.4 MG/ML
0.2 VIAL (ML) INJECTION AS NEEDED
Status: DISCONTINUED | OUTPATIENT
Start: 2022-07-01 | End: 2022-07-01 | Stop reason: HOSPADM

## 2022-07-01 RX ORDER — PROMETHAZINE HYDROCHLORIDE 25 MG/1
25 SUPPOSITORY RECTAL ONCE AS NEEDED
Status: DISCONTINUED | OUTPATIENT
Start: 2022-07-01 | End: 2022-07-01 | Stop reason: HOSPADM

## 2022-07-01 RX ORDER — SODIUM CHLORIDE, SODIUM LACTATE, POTASSIUM CHLORIDE, CALCIUM CHLORIDE 600; 310; 30; 20 MG/100ML; MG/100ML; MG/100ML; MG/100ML
9 INJECTION, SOLUTION INTRAVENOUS CONTINUOUS
Status: DISCONTINUED | OUTPATIENT
Start: 2022-07-01 | End: 2022-07-02 | Stop reason: HOSPADM

## 2022-07-01 RX ORDER — HYDROCODONE BITARTRATE AND ACETAMINOPHEN 10; 325 MG/1; MG/1
1 TABLET ORAL EVERY 4 HOURS PRN
Status: DISCONTINUED | OUTPATIENT
Start: 2022-07-01 | End: 2022-07-02 | Stop reason: HOSPADM

## 2022-07-01 RX ORDER — HYDROCODONE BITARTRATE AND ACETAMINOPHEN 5; 325 MG/1; MG/1
1 TABLET ORAL EVERY 4 HOURS PRN
Status: DISCONTINUED | OUTPATIENT
Start: 2022-07-01 | End: 2022-07-02 | Stop reason: HOSPADM

## 2022-07-01 RX ORDER — WOUND DRESSING ADHESIVE - LIQUID
LIQUID MISCELLANEOUS AS NEEDED
Status: DISCONTINUED | OUTPATIENT
Start: 2022-07-01 | End: 2022-07-01 | Stop reason: HOSPADM

## 2022-07-01 RX ORDER — FLUMAZENIL 0.1 MG/ML
0.2 INJECTION INTRAVENOUS AS NEEDED
Status: DISCONTINUED | OUTPATIENT
Start: 2022-07-01 | End: 2022-07-01 | Stop reason: HOSPADM

## 2022-07-01 RX ORDER — PROMETHAZINE HYDROCHLORIDE 25 MG/1
25 TABLET ORAL ONCE AS NEEDED
Status: DISCONTINUED | OUTPATIENT
Start: 2022-07-01 | End: 2022-07-01 | Stop reason: HOSPADM

## 2022-07-01 RX ORDER — ONDANSETRON 2 MG/ML
INJECTION INTRAMUSCULAR; INTRAVENOUS AS NEEDED
Status: DISCONTINUED | OUTPATIENT
Start: 2022-07-01 | End: 2022-07-01 | Stop reason: SURG

## 2022-07-01 RX ORDER — DIPHENHYDRAMINE HCL 25 MG
25 CAPSULE ORAL
Status: DISCONTINUED | OUTPATIENT
Start: 2022-07-01 | End: 2022-07-01 | Stop reason: HOSPADM

## 2022-07-01 RX ORDER — SODIUM CHLORIDE 9 MG/ML
INJECTION, SOLUTION INTRAVENOUS AS NEEDED
Status: DISCONTINUED | OUTPATIENT
Start: 2022-07-01 | End: 2022-07-01 | Stop reason: HOSPADM

## 2022-07-01 RX ORDER — IBUPROFEN 600 MG/1
600 TABLET ORAL ONCE AS NEEDED
Status: DISCONTINUED | OUTPATIENT
Start: 2022-07-01 | End: 2022-07-01 | Stop reason: HOSPADM

## 2022-07-01 RX ORDER — DIPHENHYDRAMINE HYDROCHLORIDE 50 MG/ML
25 INJECTION INTRAMUSCULAR; INTRAVENOUS NIGHTLY PRN
Status: DISCONTINUED | OUTPATIENT
Start: 2022-07-01 | End: 2022-07-02 | Stop reason: HOSPADM

## 2022-07-01 RX ORDER — ACETAMINOPHEN 325 MG/1
650 TABLET ORAL EVERY 4 HOURS PRN
Status: DISCONTINUED | OUTPATIENT
Start: 2022-07-01 | End: 2022-07-02 | Stop reason: HOSPADM

## 2022-07-01 RX ORDER — SODIUM CHLORIDE 0.9 % (FLUSH) 0.9 %
10 SYRINGE (ML) INJECTION EVERY 12 HOURS SCHEDULED
Status: DISCONTINUED | OUTPATIENT
Start: 2022-07-01 | End: 2022-07-01 | Stop reason: HOSPADM

## 2022-07-01 RX ORDER — HYDROCODONE BITARTRATE AND ACETAMINOPHEN 7.5; 325 MG/1; MG/1
1 TABLET ORAL ONCE AS NEEDED
Status: DISCONTINUED | OUTPATIENT
Start: 2022-07-01 | End: 2022-07-01 | Stop reason: HOSPADM

## 2022-07-01 RX ORDER — HYDRALAZINE HYDROCHLORIDE 20 MG/ML
5 INJECTION INTRAMUSCULAR; INTRAVENOUS
Status: DISCONTINUED | OUTPATIENT
Start: 2022-07-01 | End: 2022-07-01 | Stop reason: HOSPADM

## 2022-07-01 RX ORDER — LIDOCAINE HYDROCHLORIDE 20 MG/ML
INJECTION, SOLUTION INFILTRATION; PERINEURAL AS NEEDED
Status: DISCONTINUED | OUTPATIENT
Start: 2022-07-01 | End: 2022-07-01 | Stop reason: SURG

## 2022-07-01 RX ORDER — GLYCOPYRROLATE 0.2 MG/ML
INJECTION INTRAMUSCULAR; INTRAVENOUS AS NEEDED
Status: DISCONTINUED | OUTPATIENT
Start: 2022-07-01 | End: 2022-07-01 | Stop reason: SURG

## 2022-07-01 RX ORDER — BUPIVACAINE HYDROCHLORIDE AND EPINEPHRINE 5; 5 MG/ML; UG/ML
INJECTION, SOLUTION EPIDURAL; INTRACAUDAL; PERINEURAL AS NEEDED
Status: DISCONTINUED | OUTPATIENT
Start: 2022-07-01 | End: 2022-07-01 | Stop reason: HOSPADM

## 2022-07-01 RX ORDER — KETOROLAC TROMETHAMINE 30 MG/ML
30 INJECTION, SOLUTION INTRAMUSCULAR; INTRAVENOUS EVERY 8 HOURS
Status: DISCONTINUED | OUTPATIENT
Start: 2022-07-01 | End: 2022-07-02 | Stop reason: HOSPADM

## 2022-07-01 RX ORDER — SODIUM CHLORIDE 0.9 % (FLUSH) 0.9 %
3 SYRINGE (ML) INJECTION EVERY 12 HOURS SCHEDULED
Status: DISCONTINUED | OUTPATIENT
Start: 2022-07-01 | End: 2022-07-01 | Stop reason: HOSPADM

## 2022-07-01 RX ORDER — DIPHENHYDRAMINE HCL 25 MG
25 CAPSULE ORAL NIGHTLY PRN
Status: DISCONTINUED | OUTPATIENT
Start: 2022-07-01 | End: 2022-07-02 | Stop reason: HOSPADM

## 2022-07-01 RX ORDER — SCOLOPAMINE TRANSDERMAL SYSTEM 1 MG/1
1 PATCH, EXTENDED RELEASE TRANSDERMAL ONCE
Status: COMPLETED | OUTPATIENT
Start: 2022-07-01 | End: 2022-07-02

## 2022-07-01 RX ORDER — PROMETHAZINE HYDROCHLORIDE 12.5 MG/1
12.5 SUPPOSITORY RECTAL EVERY 6 HOURS PRN
Status: DISCONTINUED | OUTPATIENT
Start: 2022-07-01 | End: 2022-07-02 | Stop reason: HOSPADM

## 2022-07-01 RX ORDER — HYDROMORPHONE HYDROCHLORIDE 1 MG/ML
0.5 INJECTION, SOLUTION INTRAMUSCULAR; INTRAVENOUS; SUBCUTANEOUS
Status: DISCONTINUED | OUTPATIENT
Start: 2022-07-01 | End: 2022-07-02 | Stop reason: HOSPADM

## 2022-07-01 RX ORDER — HYDROMORPHONE HYDROCHLORIDE 1 MG/ML
0.5 INJECTION, SOLUTION INTRAMUSCULAR; INTRAVENOUS; SUBCUTANEOUS
Status: DISCONTINUED | OUTPATIENT
Start: 2022-07-01 | End: 2022-07-01

## 2022-07-01 RX ORDER — FENTANYL CITRATE 50 UG/ML
50 INJECTION, SOLUTION INTRAMUSCULAR; INTRAVENOUS
Status: DISCONTINUED | OUTPATIENT
Start: 2022-07-01 | End: 2022-07-01 | Stop reason: HOSPADM

## 2022-07-01 RX ORDER — ONDANSETRON 2 MG/ML
4 INJECTION INTRAMUSCULAR; INTRAVENOUS EVERY 6 HOURS PRN
Status: DISCONTINUED | OUTPATIENT
Start: 2022-07-01 | End: 2022-07-02 | Stop reason: HOSPADM

## 2022-07-01 RX ORDER — EPHEDRINE SULFATE 50 MG/ML
5 INJECTION, SOLUTION INTRAVENOUS ONCE AS NEEDED
Status: DISCONTINUED | OUTPATIENT
Start: 2022-07-01 | End: 2022-07-01 | Stop reason: HOSPADM

## 2022-07-01 RX ORDER — ONDANSETRON 2 MG/ML
4 INJECTION INTRAMUSCULAR; INTRAVENOUS ONCE AS NEEDED
Status: DISCONTINUED | OUTPATIENT
Start: 2022-07-01 | End: 2022-07-01 | Stop reason: HOSPADM

## 2022-07-01 RX ORDER — OXYCODONE AND ACETAMINOPHEN 7.5; 325 MG/1; MG/1
1 TABLET ORAL EVERY 4 HOURS PRN
Status: DISCONTINUED | OUTPATIENT
Start: 2022-07-01 | End: 2022-07-01 | Stop reason: HOSPADM

## 2022-07-01 RX ORDER — DEXAMETHASONE SODIUM PHOSPHATE 10 MG/ML
INJECTION INTRAMUSCULAR; INTRAVENOUS AS NEEDED
Status: DISCONTINUED | OUTPATIENT
Start: 2022-07-01 | End: 2022-07-01 | Stop reason: SURG

## 2022-07-01 RX ORDER — MIDAZOLAM HYDROCHLORIDE 1 MG/ML
1 INJECTION INTRAMUSCULAR; INTRAVENOUS
Status: DISCONTINUED | OUTPATIENT
Start: 2022-07-01 | End: 2022-07-01 | Stop reason: HOSPADM

## 2022-07-01 RX ORDER — SODIUM CHLORIDE 0.9 % (FLUSH) 0.9 %
10 SYRINGE (ML) INJECTION AS NEEDED
Status: DISCONTINUED | OUTPATIENT
Start: 2022-07-01 | End: 2022-07-01 | Stop reason: HOSPADM

## 2022-07-01 RX ORDER — CETIRIZINE HYDROCHLORIDE 10 MG/1
10 TABLET ORAL DAILY
Status: DISCONTINUED | OUTPATIENT
Start: 2022-07-01 | End: 2022-07-02 | Stop reason: HOSPADM

## 2022-07-01 RX ORDER — FAMOTIDINE 20 MG/1
20 TABLET, FILM COATED ORAL DAILY
Status: DISCONTINUED | OUTPATIENT
Start: 2022-07-01 | End: 2022-07-02 | Stop reason: HOSPADM

## 2022-07-01 RX ORDER — MAGNESIUM HYDROXIDE 1200 MG/15ML
LIQUID ORAL AS NEEDED
Status: DISCONTINUED | OUTPATIENT
Start: 2022-07-01 | End: 2022-07-01 | Stop reason: HOSPADM

## 2022-07-01 RX ORDER — ONDANSETRON 4 MG/1
4 TABLET, FILM COATED ORAL EVERY 6 HOURS PRN
Status: DISCONTINUED | OUTPATIENT
Start: 2022-07-01 | End: 2022-07-02 | Stop reason: HOSPADM

## 2022-07-01 RX ORDER — PROPOFOL 10 MG/ML
VIAL (ML) INTRAVENOUS AS NEEDED
Status: DISCONTINUED | OUTPATIENT
Start: 2022-07-01 | End: 2022-07-01 | Stop reason: SURG

## 2022-07-01 RX ORDER — LABETALOL HYDROCHLORIDE 5 MG/ML
5 INJECTION, SOLUTION INTRAVENOUS
Status: DISCONTINUED | OUTPATIENT
Start: 2022-07-01 | End: 2022-07-01 | Stop reason: HOSPADM

## 2022-07-01 RX ORDER — DIPHENHYDRAMINE HYDROCHLORIDE 50 MG/ML
12.5 INJECTION INTRAMUSCULAR; INTRAVENOUS
Status: DISCONTINUED | OUTPATIENT
Start: 2022-07-01 | End: 2022-07-01 | Stop reason: HOSPADM

## 2022-07-01 RX ORDER — ACETAMINOPHEN 160 MG/5ML
650 SOLUTION ORAL EVERY 4 HOURS PRN
Status: DISCONTINUED | OUTPATIENT
Start: 2022-07-01 | End: 2022-07-02 | Stop reason: HOSPADM

## 2022-07-01 RX ORDER — FAMOTIDINE 10 MG/ML
20 INJECTION, SOLUTION INTRAVENOUS ONCE
Status: COMPLETED | OUTPATIENT
Start: 2022-07-01 | End: 2022-07-01

## 2022-07-01 RX ORDER — CALCIUM CARBONATE 200(500)MG
2 TABLET,CHEWABLE ORAL 3 TIMES DAILY PRN
Status: DISCONTINUED | OUTPATIENT
Start: 2022-07-01 | End: 2022-07-02 | Stop reason: HOSPADM

## 2022-07-01 RX ORDER — SODIUM CHLORIDE 0.9 % (FLUSH) 0.9 %
3-10 SYRINGE (ML) INJECTION AS NEEDED
Status: DISCONTINUED | OUTPATIENT
Start: 2022-07-01 | End: 2022-07-01 | Stop reason: HOSPADM

## 2022-07-01 RX ORDER — BISACODYL 10 MG
10 SUPPOSITORY, RECTAL RECTAL DAILY PRN
Status: DISCONTINUED | OUTPATIENT
Start: 2022-07-01 | End: 2022-07-02 | Stop reason: HOSPADM

## 2022-07-01 RX ORDER — SODIUM CHLORIDE, SODIUM LACTATE, POTASSIUM CHLORIDE, CALCIUM CHLORIDE 600; 310; 30; 20 MG/100ML; MG/100ML; MG/100ML; MG/100ML
100 INJECTION, SOLUTION INTRAVENOUS CONTINUOUS
Status: DISCONTINUED | OUTPATIENT
Start: 2022-07-01 | End: 2022-07-02 | Stop reason: HOSPADM

## 2022-07-01 RX ORDER — HYDROMORPHONE HYDROCHLORIDE 1 MG/ML
0.5 INJECTION, SOLUTION INTRAMUSCULAR; INTRAVENOUS; SUBCUTANEOUS
Status: DISCONTINUED | OUTPATIENT
Start: 2022-07-01 | End: 2022-07-01 | Stop reason: HOSPADM

## 2022-07-01 RX ADMIN — SCOPALAMINE 1 PATCH: 1 PATCH, EXTENDED RELEASE TRANSDERMAL at 11:10

## 2022-07-01 RX ADMIN — ONDANSETRON 4 MG: 2 INJECTION INTRAMUSCULAR; INTRAVENOUS at 12:55

## 2022-07-01 RX ADMIN — SODIUM CHLORIDE 100 ML/HR: 9 INJECTION, SOLUTION INTRAVENOUS at 15:51

## 2022-07-01 RX ADMIN — ROCURONIUM BROMIDE 40 MG: 50 INJECTION INTRAVENOUS at 11:30

## 2022-07-01 RX ADMIN — PROPOFOL 50 MG: 10 INJECTION, EMULSION INTRAVENOUS at 11:57

## 2022-07-01 RX ADMIN — CEFAZOLIN SODIUM 2 G: 2 INJECTION, SOLUTION INTRAVENOUS at 11:10

## 2022-07-01 RX ADMIN — FAMOTIDINE 20 MG: 10 INJECTION, SOLUTION INTRAVENOUS at 11:10

## 2022-07-01 RX ADMIN — KETOROLAC TROMETHAMINE 30 MG: 30 INJECTION, SOLUTION INTRAMUSCULAR at 21:05

## 2022-07-01 RX ADMIN — SUGAMMADEX 200 MG: 100 INJECTION, SOLUTION INTRAVENOUS at 13:12

## 2022-07-01 RX ADMIN — DEXAMETHASONE SODIUM PHOSPHATE 8 MG: 10 INJECTION INTRAMUSCULAR; INTRAVENOUS at 11:40

## 2022-07-01 RX ADMIN — SIMETHICONE 120 MG: 80 TABLET, CHEWABLE ORAL at 21:04

## 2022-07-01 RX ADMIN — MIDAZOLAM 1 MG: 1 INJECTION INTRAMUSCULAR; INTRAVENOUS at 11:19

## 2022-07-01 RX ADMIN — HYDROCORTISONE SODIUM SUCCINATE 100 MG: 100 INJECTION, POWDER, FOR SOLUTION INTRAMUSCULAR; INTRAVENOUS at 11:40

## 2022-07-01 RX ADMIN — SODIUM CHLORIDE, POTASSIUM CHLORIDE, SODIUM LACTATE AND CALCIUM CHLORIDE 9 ML/HR: 600; 310; 30; 20 INJECTION, SOLUTION INTRAVENOUS at 11:09

## 2022-07-01 RX ADMIN — SODIUM CHLORIDE, POTASSIUM CHLORIDE, SODIUM LACTATE AND CALCIUM CHLORIDE 100 ML/HR: 600; 310; 30; 20 INJECTION, SOLUTION INTRAVENOUS at 11:05

## 2022-07-01 RX ADMIN — LIDOCAINE HYDROCHLORIDE 60 MG: 20 INJECTION, SOLUTION INFILTRATION; PERINEURAL at 11:30

## 2022-07-01 RX ADMIN — HYDROMORPHONE HYDROCHLORIDE 0.5 MG: 1 INJECTION, SOLUTION INTRAMUSCULAR; INTRAVENOUS; SUBCUTANEOUS at 13:58

## 2022-07-01 RX ADMIN — HYDROMORPHONE HYDROCHLORIDE 0.5 MG: 1 INJECTION, SOLUTION INTRAMUSCULAR; INTRAVENOUS; SUBCUTANEOUS at 15:24

## 2022-07-01 RX ADMIN — HYDROMORPHONE HYDROCHLORIDE 0.25 MG: 1 INJECTION, SOLUTION INTRAMUSCULAR; INTRAVENOUS; SUBCUTANEOUS at 12:40

## 2022-07-01 RX ADMIN — FENTANYL CITRATE 50 MCG: 50 INJECTION INTRAMUSCULAR; INTRAVENOUS at 11:30

## 2022-07-01 RX ADMIN — GLYCOPYRROLATE 0.1 MG: 0.2 INJECTION INTRAMUSCULAR; INTRAVENOUS at 11:40

## 2022-07-01 RX ADMIN — FENTANYL CITRATE 50 MCG: 50 INJECTION INTRAMUSCULAR; INTRAVENOUS at 11:43

## 2022-07-01 RX ADMIN — PROPOFOL 200 MG: 10 INJECTION, EMULSION INTRAVENOUS at 11:30

## 2022-07-01 RX ADMIN — HYDROMORPHONE HYDROCHLORIDE 0.25 MG: 1 INJECTION, SOLUTION INTRAMUSCULAR; INTRAVENOUS; SUBCUTANEOUS at 13:20

## 2022-07-01 RX ADMIN — FENTANYL CITRATE 50 MCG: 50 INJECTION INTRAMUSCULAR; INTRAVENOUS at 13:39

## 2022-07-01 RX ADMIN — SIMETHICONE 120 MG: 80 TABLET, CHEWABLE ORAL at 19:01

## 2022-07-01 NOTE — PERIOPERATIVE NURSING NOTE
Notified Dr. Dumont office, [facility], that pt is in surgery now and will be aadmitted post surgery.

## 2022-07-01 NOTE — ANESTHESIA POSTPROCEDURE EVALUATION
"Patient: Regi Glass    Procedure Summary     Date: 07/01/22 Room / Location: [facility] OR  / [facility] MAIN OR    Anesthesia Start: 1125 Anesthesia Stop: 1331    Procedure: Exam under anesthesia, Robotic assisted total laparoscopic hysterectomy, bilateral salpingectomy, unilateral oophorecotmy, (N/A Abdomen) Diagnosis:       Ovarian mass      (Ovarian mass [N83.8])    Surgeons: Aurora Craven DO Provider: Shady Dash MD    Anesthesia Type: general ASA Status: 1          Anesthesia Type: general    Vitals  Vitals Value Taken Time   /93 07/01/22 1416   Temp 36.4 °C (97.6 °F) 07/01/22 1329   Pulse 86 07/01/22 1427   Resp 18 07/01/22 1415   SpO2 94 % 07/01/22 1427   Vitals shown include unvalidated device data.        Post Anesthesia Care and Evaluation    Patient location during evaluation: bedside  Patient participation: complete - patient participated  Level of consciousness: awake and alert  Pain management: adequate    Airway patency: patent  Anesthetic complications: No anesthetic complications  PONV Status: controlled  Cardiovascular status: acceptable and hemodynamically stable  Respiratory status: acceptable, spontaneous ventilation and nonlabored ventilation  Hydration status: acceptable    Comments: /93 (BP Location: Right arm, Patient Position: Lying)   Pulse 93   Temp 36.4 °C (97.6 °F) (Oral)   Resp 18   Ht 160 cm (63\")   Wt 53.9 kg (118 lb 13.3 oz)   LMP 06/13/2022 (Exact Date) Comment: NEGATIVE SERUM HCG 6/29/22  SpO2 100%   BMI 21.05 kg/m²         "

## 2022-07-01 NOTE — ANESTHESIA PROCEDURE NOTES
Airway  Urgency: elective    Date/Time: 7/1/2022 11:32 AM  Airway not difficult    General Information and Staff    Patient location during procedure: OR  Anesthesiologist: Shady Dash MD  CRNA/CAA: Princess Johnston CRNA    Indications and Patient Condition  Indications for airway management: airway protection    Preoxygenated: yes  MILS not maintained throughout  Mask difficulty assessment: 1 - vent by mask    Final Airway Details  Final airway type: endotracheal airway      Successful airway: ETT  Cuffed: yes   Successful intubation technique: direct laryngoscopy  Facilitating devices/methods: intubating stylet  Blade: Sam  Blade size: 3  ETT size (mm): 7.0  Cormack-Lehane Classification: grade I - full view of glottis  Placement verified by: chest auscultation and capnometry   Cuff volume (mL): 8  Measured from: teeth  ETT/EBT  to teeth (cm): 21  Number of attempts at approach: 1  Assessment: lips, teeth, and gum same as pre-op and atraumatic intubation

## 2022-07-01 NOTE — ANESTHESIA PREPROCEDURE EVALUATION
Anesthesia Evaluation     Patient summary reviewed and Nursing notes reviewed   no history of anesthetic complications:  NPO Solid Status: > 8 hours  NPO Liquid Status: > 4 hours           Airway   Mallampati: I  Dental - normal exam     Pulmonary - negative pulmonary ROS and normal exam   Cardiovascular - negative cardio ROS and normal exam        Neuro/Psych- negative ROS  GI/Hepatic/Renal/Endo - negative ROS     Musculoskeletal     Abdominal    Substance History      OB/GYN          Other          Other Comment: ITP                  Anesthesia Plan    ASA 1     general     intravenous induction     Anesthetic plan, risks, benefits, and alternatives have been provided, discussed and informed consent has been obtained with: patient.        CODE STATUS:

## 2022-07-01 NOTE — TELEPHONE ENCOUNTER
CALLED PATIENT TO SEE WHY SHE WAS NOT HERE YET FOR HER SURGERY. SHE SAID SINCE IT WAS CHANGED LAST MIN THAT SHE HAD TO FIND SOMEONE TO GET HER KIDS. SHE IS HEADED HERE NOW AND WILL BE HERE BY 10 AM.

## 2022-07-01 NOTE — PLAN OF CARE
Goal Outcome Evaluation:  Plan of Care Reviewed With: patient        Progress: no change  Outcome Evaluation: VSS. Dilauded for pain. Zaldivar to BSD. Tolerating reg diet

## 2022-07-01 NOTE — OP NOTE
Gynecologic Oncology - Operative Report         PATIENT NAME: Regi Glass  YOB: 1980   AGE: 42 y.o.  MRN#: [id number]  [facility]#: [id number]      DATE OF OPERATION: 7/1/2022    PREOPERATIVE DIAGNOSIS:   1. Adnexal mass  2. AUB     POSTOPERATIVE DIAGNOSIS:  1. same    OPERATION PERFORMED:    1. Robotic assisted hysterectomy bilateral salpingectomy and left oophorectomy     SURGEON: Aurora Craven D.O     ASSISTANT: Brooklyn SIMS - who assisted with all aspects of the case including positioning, set up, retraction, suction, suturing and closure.       ANESTHESIA: GEN    ESTIMATED BLOOD LOSS: 100mL  IVF: 1350mL L R  UO: 150mL  LINES/DRAINS: n/a      PROCEDURE:   After assuring informed consent the patient was taken back to the operating suite and induction of general anesthesia was performed.  The patient was placed in the modified dorsal lithotomy position in Mike stirrups then prepped and draped in the normal sterile fashion.  The open sided bi-valve speculum was placed in the patient's vagina and the cervix was grasped at the 12 O'clock position with a single tooth tenaculum.  The uterus was sounded to 12 cm and the cervix was dilated with the garth dilators up to a number 15.  A V-Care uterine manipulator was placed inside the uterine cavity.  Zaldivar catheter was placed.  Gloves were changed and attention was then taken to the abdomen.    A supraumbilical skin incision was then made approximately 26cm above the pubic symphysis and a veress needle was introduced into this incision.  Proper placement was confirmed by sterile water flowing easily into this incision and a low opening CO2 pressure.  High flow insufflation was utilized to create pneumoperitoneum to a maximum pressure of 15 mmHg.  An 8mm trocar was then inserted through this incision and the Robotic camera verified proper placement and safe entry for camera port/robotic arm #3.  Three additional 8mm ports were placed.  Robotic arm #4  port was placed 10cm right lateral and 15 degrees inferiorly.  Robotic arm #2 port was placed 10 cm left lateral and 15 degrees inferiorly. Arm 1 was placed 10cm left lateral and 15 degrees superior to arm #2.  A 10/12 accessory port was placed beneath the right costal margin.  The patient was then placed in steep Trendelenberg position.  Pelvic washings were obtained.  Blunt grasper was used to move bowel and omentum into the upper abdomen for maximum visualization. The robot was docked.  Monopolar scissors were placed in arm #4; bipolar fenestrated graspers in arm #2 and the double fenestrated graspers were placed in arm #1.      Right tube was dissected off the complex. Bilateral round ligaments were taken down with monopolar cautery and the retroperitoneal space was opened.  The underlying ureters were visualized. A window was created in the posterior leaf of the broad ligament.  The right uteroovarian ligment and the left infundibulopelvic ligament was cauterized with bipolar cautery and cut with monopolar cautery.  The vesicouterine peritoneum was incised and the bladder was dissected off the lower uterine segment and cervix.  The uterine vessels were then cauterized bilaterally with the bipolar and transected with the monopolar cautery.  The cardinal and uterosacral ligaments were then cauterized with the bipolar and transected with the monopolar cautery.  A posterior colpotomy was made at the level of the Vcare and brought around circumferentially freeing up the specimen, which was brought out through the vagina.    The vaginal cuff was then reapproximated with 0 PDS suture in a running fashion, starting from either end and tying in the middle.  The pelvis was then irrigated and the vaginal cuff was noted to be hemostatic.  The infundibulopelvic ligaments were noted to be hemostatic as well.  The instruments were then removed and the robot was undocked.  The pneumoperitoneum was then evacuated and all  trocars were removed.  The 10-12 mm trocar site fascia was reapproximated with a figure of eight stitch of 0 vicryl.  All skin incisions were closed with a 4-0 vicryl subcuticular stitch.  Incisions were reinjected with additional local anesthetic. Steri-strips were placed as well as bandages.  The vagina was examined with sponge sticks x 2 and noted to be hemostatic.  Sponge, lap, needle and instrument counts were correct x 2.  The patient was taken to the recovery room in awake and stable condition.        Aurora Craven D.O  7/1/2022    Gynecologic Oncology   [address] [address] [phone] office

## 2022-07-01 NOTE — H&P
Pt seen and examined in pre op holding area. There are no changes to her original H & P.   We reviewed the procedure as planned, as well as the benefits, risks, alternatives and possible complications (bleeding, infection, hemorrhage, damage to surrournding structures including bladder or bowel, failure to cure, need for additional treatment, as well as perioperative cardiopulmonary, thromboembolic or other medical events.)   We reviewed post operative care and home instructions.  All questions were answered and she wishes to proceed with surgery.           Aurora Craven D.O  2022  10:13 EDT    Gynecologic Oncology   [address] [facility] [address]  [place] [address]    [phone] office            Age: 42 y.o.  Sex: female  :  1980  MRN: [id number]       REFERRING PHYSICIAN: Aurora Craven DO  DATE OF VISIT: 2022        Regi Glass presents to [facility] for evaluation and management of left ovarian cyst. TVUS shows uterus 10cm, ET 8mm, Right ovary 1cm, left ovary 7.5cm. She has no bloating or pelvic pain. No family history of breast or ovarian cancer.  Pt with known h/o ITP, platelets usually around 60k. Sees Dr Eastman. +SLE/antiphoscpholipid Ab.    She was advised after last visit on 22 to follow up with Dr. Eastman to boost platelet count prior to surgery.  Pt placed on prednisone- responded well, currently taking Prednisone 40 mg daily.  Platelets within normal range- most recently 197 on 22.  She has been cleared for surgery from oncology perspective.        Oncology/Hematology History Overview Note   Regi Glass is a 42 y.o. female referred by Dr. Olga Mello ([facility]) for left ovarian cysts.    • 22: Pap smear-negative  • 22: TVUS-uterus-97 x 60 x 72 mm, ET-8.9 mm, right ovary-28 x 15 x 16 mm, left ovary-66 x 55 x 45 mm, cyst-31 x 17 x 29 mm, cyst-46 x 40 x 43 mm.  • 22: TVUS-uterus-107 x 62 x 62 mm, ET-8.8 mm, right ovary-20 x 15 x  17 mm, left ovary-75 x 70 x 52 mm, cyst-61 x 53 x 47 mm, cyst-30 x 30 x 24 mm.    22: exam prior to surgery       Date Value Ref Range Status   2022 90.1 (H) 0.0 - 38.1 U/mL Final            Past Medical History:  Past Medical History:   Diagnosis Date   • Cytopenia    • Endometriosis    • Gestational thrombocytopenia (HCC)    • Hx of folliculitis     IN THE LEFT OUTER ASPECT OF LABIA   • ITP (idiopathic thrombocytopenic purpura)     INCLUDING PLATELET CLUMPING   • Joint pain    • Left ovarian cyst    • Lupus (HCC)    • Seasonal allergies    • Thrombocytopenia (HCC)        Past Surgical History:  Past Surgical History:   Procedure Laterality Date   •  SECTION      X2   • DIAGNOSTIC LAPAROSCOPY EXPLORATORY LAPAROTOMY      1.Status post exploratory laparoscopy diagnosed with endometriosis in 2006.   • DILATATION AND CURETTAGE     • INCISION AND DRAINAGE ABSCESS      FOLLICULITIS IN THE LEFT OUTER ASPECT OF LABIA        MEDICATIONS:  No current facility-administered medications for this encounter.    ALLERGIES:  Allergies   Allergen Reactions   • Bactrim [Sulfamethoxazole-Trimethoprim] Rash         ROS:  CONSTITUTIONAL:  Denies fever or chills.   NEUROLOGIC:  Denies headache, focal weakness or sensory changes.   EYES:  Denies change in visual acuity.  HEENT:  Denies nasal congestion or sore throat.   RESPIRATORY:  Denies cough or shortness of breath.   CARDIOVASCULAR:  Denies chest pain or edema.   GI:  Denies abdominal pain, nausea, vomiting, bloody stools or diarrhea.   :  Denies dysuria, leaking or incontinence.  MUSCULOSKELETAL:  Denies back pain or joint pain.   INTEGUMENT:  Denies rash.   ENDOCRINE:  Denies polyuria or polydipsia.   LYMPHATIC:  Denies swollen glands or lymphedema.   PSYCHIATRIC:  Denies depression or anxiety.      PHYSICAL EXAM:  There were no vitals filed for this visit.  There is no height or weight on file to calculate BMI.  Current Status 2022    ECOG score 0     PHQ-9 Total Score:         GEN: alert and oriented x 3, normal affect, well nourished and hydrated.  CARDIO: regular rate and rhythm.  PULM: Lungs CTA b.l, no RRW   ABD: Soft, nontender, nondistended.   GYN: External genitalia normal, no lesions. Speculum with normal vagina, normal appearingcervix without lesions. Bimanual exam does not reveal any palpable lesions. +Left adnexal fullness   EXT: No petechiae, bruising, rash, candida. No CCE.       Result Review :  The pertinent labs, images, and/or pathology as noted in the oncology history were reviewed independently and discussed with the patient.   No name on file.   05/02/2022    [facility] LABS:   WBC   Date Value Ref Range Status   06/29/2022 13.31 (H) 3.40 - 10.80 10*3/mm3 Final     RBC   Date Value Ref Range Status   06/29/2022 4.98 3.77 - 5.28 10*6/mm3 Final     Hemoglobin   Date Value Ref Range Status   06/29/2022 14.3 12.0 - 15.9 g/dL Final     Hematocrit   Date Value Ref Range Status   06/29/2022 41.9 34.0 - 46.6 % Final     Platelets   Date Value Ref Range Status   06/29/2022 113 (L) 140 - 450 10*3/mm3 Final        Date Value Ref Range Status   05/31/2022 90.1 (H) 0.0 - 38.1 U/mL Final       [facility] IMAGING:  XR Chest 1 View    Result Date: 6/30/2022  No evidence for active disease in the chest.  This report was finalized on 6/30/2022 6:08 AM by Dr. Aroldo Islas M.D.              ASSESSMENT :  • Left ovarian cyst 7.5cm   • Thrombocytopenia (ITP)  o Platelets stable- most recently 197 on 6/14/22  o Prednisone 40 mg daily  o Followed by Dr. Eastman  o cleared for surgery from oncology perspective  • SLE / antiphospholipid Ab          PLAN :  Perioperative mgmt: platelets available in pre op   Med onc clearance - improve platelet counts as much as possible before surgery     The case was reviewed with the patient in detail, taking into consideration symptoms, laboratory results, imaging, pathology and physical exam findings.  At this  point in time, I am recommending Exam under anesthesia, Robotic assisted total laparoscopic hysterectomy, bilateral salpingectomy, unilateral oophorecotmy, possible staging, possible laparotomy.       Risks, benefits, alternatives and indications to the procedure were reviewed in detail.    Risks of surgery include bleeding/hemorrhage which may require transfusion and associated transfusion risk (transfusion reaction, HCV, TRALI ); Infection, which may require antibiotic therapy or abscess drainage; Damage to surrounding internal organs (bowel, bladder, or urinary tract) which may result in obstruction or fistula; Nerve, or vascular injury which may result in numbness, pain, swelling or loss of strength. Risk of possible incomplete resolution of symptoms or failure to cure.  She understands that it is possible that intraoperative findings may warrant further treatment.  There is a low, but real, risk of unanticipated return to the OR for a problem associated with the surgery and a remote possibility of death.       • All questions were addressed and answered to the patient's satisfaction. She indicates understanding of these risks and desires to proceed. She wishes me to use my judgement to do the procedure that I feel is in her best interest. Surgical consents were signed.              Aurora Craven D.O  7/1/2022    Gynecologic Oncology   [address] [place] [address]  [phone] office

## 2022-07-02 VITALS
BODY MASS INDEX: 21.05 KG/M2 | HEIGHT: 63 IN | WEIGHT: 118.83 LBS | HEART RATE: 80 BPM | RESPIRATION RATE: 16 BRPM | TEMPERATURE: 97.7 F | SYSTOLIC BLOOD PRESSURE: 104 MMHG | OXYGEN SATURATION: 95 % | DIASTOLIC BLOOD PRESSURE: 57 MMHG

## 2022-07-02 LAB
ANION GAP SERPL CALCULATED.3IONS-SCNC: 10 MMOL/L (ref 5–15)
BASOPHILS # BLD AUTO: 0.04 10*3/MM3 (ref 0–0.2)
BASOPHILS NFR BLD AUTO: 0.2 % (ref 0–1.5)
BUN SERPL-MCNC: 9 MG/DL (ref 6–20)
BUN/CREAT SERPL: 17 (ref 7–25)
CALCIUM SPEC-SCNC: 8.3 MG/DL (ref 8.6–10.5)
CHLORIDE SERPL-SCNC: 104 MMOL/L (ref 98–107)
CO2 SERPL-SCNC: 24 MMOL/L (ref 22–29)
CREAT SERPL-MCNC: 0.53 MG/DL (ref 0.57–1)
DEPRECATED RDW RBC AUTO: 38.7 FL (ref 37–54)
EGFRCR SERPLBLD CKD-EPI 2021: 118.6 ML/MIN/1.73
EOSINOPHIL # BLD AUTO: 0 10*3/MM3 (ref 0–0.4)
EOSINOPHIL NFR BLD AUTO: 0 % (ref 0.3–6.2)
ERYTHROCYTE [DISTWIDTH] IN BLOOD BY AUTOMATED COUNT: 12.2 % (ref 12.3–15.4)
GLUCOSE SERPL-MCNC: 108 MG/DL (ref 65–99)
HCT VFR BLD AUTO: 38.1 % (ref 34–46.6)
HGB BLD-MCNC: 12.4 G/DL (ref 12–15.9)
IMM GRANULOCYTES # BLD AUTO: 0.19 10*3/MM3 (ref 0–0.05)
IMM GRANULOCYTES NFR BLD AUTO: 1.1 % (ref 0–0.5)
LYMPHOCYTES # BLD AUTO: 0.92 10*3/MM3 (ref 0.7–3.1)
LYMPHOCYTES NFR BLD AUTO: 5.1 % (ref 19.6–45.3)
MCH RBC QN AUTO: 28.4 PG (ref 26.6–33)
MCHC RBC AUTO-ENTMCNC: 32.5 G/DL (ref 31.5–35.7)
MCV RBC AUTO: 87.2 FL (ref 79–97)
MONOCYTES # BLD AUTO: 0.98 10*3/MM3 (ref 0.1–0.9)
MONOCYTES NFR BLD AUTO: 5.4 % (ref 5–12)
NEUTROPHILS NFR BLD AUTO: 15.96 10*3/MM3 (ref 1.7–7)
NEUTROPHILS NFR BLD AUTO: 88.2 % (ref 42.7–76)
NRBC BLD AUTO-RTO: 0 /100 WBC (ref 0–0.2)
PLATELET # BLD AUTO: 127 10*3/MM3 (ref 140–450)
PMV BLD AUTO: 10.9 FL (ref 6–12)
POTASSIUM SERPL-SCNC: 4.5 MMOL/L (ref 3.5–5.2)
RBC # BLD AUTO: 4.37 10*6/MM3 (ref 3.77–5.28)
SODIUM SERPL-SCNC: 138 MMOL/L (ref 136–145)
WBC NRBC COR # BLD: 18.09 10*3/MM3 (ref 3.4–10.8)

## 2022-07-02 PROCEDURE — 85025 COMPLETE CBC W/AUTO DIFF WBC: CPT | Performed by: OBSTETRICS & GYNECOLOGY

## 2022-07-02 PROCEDURE — 25010000002 KETOROLAC TROMETHAMINE PER 15 MG: Performed by: OBSTETRICS & GYNECOLOGY

## 2022-07-02 PROCEDURE — 80048 BASIC METABOLIC PNL TOTAL CA: CPT | Performed by: OBSTETRICS & GYNECOLOGY

## 2022-07-02 PROCEDURE — G0378 HOSPITAL OBSERVATION PER HR: HCPCS

## 2022-07-02 RX ORDER — HYDROCODONE BITARTRATE AND ACETAMINOPHEN 5; 325 MG/1; MG/1
1 TABLET ORAL EVERY 4 HOURS PRN
Qty: 30 TABLET | Refills: 0 | Status: SHIPPED | OUTPATIENT
Start: 2022-07-02 | End: 2022-07-08

## 2022-07-02 RX ADMIN — HYDROCODONE BITARTRATE AND ACETAMINOPHEN 1 TABLET: 5; 325 TABLET ORAL at 11:51

## 2022-07-02 RX ADMIN — CETIRIZINE HYDROCHLORIDE 10 MG: 10 TABLET ORAL at 09:01

## 2022-07-02 RX ADMIN — SIMETHICONE 120 MG: 80 TABLET, CHEWABLE ORAL at 09:01

## 2022-07-02 RX ADMIN — KETOROLAC TROMETHAMINE 30 MG: 30 INJECTION, SOLUTION INTRAMUSCULAR at 05:56

## 2022-07-02 RX ADMIN — SODIUM CHLORIDE 100 ML/HR: 9 INJECTION, SOLUTION INTRAVENOUS at 00:59

## 2022-07-02 RX ADMIN — FAMOTIDINE 20 MG: 20 TABLET ORAL at 09:01

## 2022-07-02 NOTE — DISCHARGE SUMMARY
DISCHARGE SUMMARY       PATIENT INFO:  NAME: Regi Glass  : 1980  MRN#: [id number]      ADMIT DATE: 2022 10:03 AM   DISCHARGE DATE: 22      ADMITTING DIAGNOSIS:   • Ovarian mass [N83.8]       DISCHARGE DIAGNOSIS:   Post operative state    Patient Active Problem List   Diagnosis   • Antiphospholipid antibody syndrome (HCC)   • Ovarian mass   •         HOSPITAL CONSULTANTS:   •  None      Surgical Procedures Since Admission:  Procedure(s):  Exam under anesthesia, Robotic assisted total laparoscopic hysterectomy, bilateral salpingectomy, unilateral oophorecotmy,  Surgeon:  Aurora Craven, DO  Status:  1 Day Post-Op  -------------------  •        OUTPATIENT CARE TEAM:   • Patient Care Team:  • Provider, No Known as PCP - General  • Provider, No Known as PCP - Family Medicine  • Jerad Eastman MD as Consulting Physician (Hematology and Oncology)  • Shady Rivera MD as Referring Physician (Internal Medicine)  • Suly Mello MD as Referring Physician (Obstetrics and Gynecology)    HOSPITAL COURSE:   Admitted post op   Did well no complications   Clear for DC POD #1          DC MEDICATIONS:   Current Discharge Medication List      CONTINUE these medications which have NOT CHANGED    Details   hydroxychloroquine (PLAQUENIL) 200 MG tablet Take 200 mg by mouth Every Night.      multivitamin with minerals tablet tablet Take 1 tablet by mouth Daily. HOLDING FOR DOS      cetirizine (zyrTEC) 10 MG tablet Take  by mouth Daily.            Current Discharge Medication List      START taking these medications    Details   HYDROcodone-acetaminophen (NORCO) 5-325 MG per tablet Take 1 tablet by mouth Every 4 (Four) Hours As Needed for Moderate Pain  for up to 6 days.  Qty: 30 tablet, Refills: 0    Associated Diagnoses: Ovarian mass                DISCHARGE INSTRUCTIONS:    • Maintain proper balance of hydration, nutrition, mobility and rest.   • Continue to use your incentive spirometer  for the rest of the week.    • Do not lift anything heavier than 10 lbs. for the next two weeks.   • Do not perform strenuous activity.  • Continue PELVC REST for 6 weeks, which means no tampons, intercourse, douching or submerging in sitting water (baths, jacuzzis or swimming).  • Keep your wound clean and dry.     • Notify the office if you experience:   - Fever > 101F.  - Foul wound drainage, redness or large separation.  - Severe nausea and vomiting.  - Severe increase in pain.   - Severe swelling in either calf.     • Driving is OK if you are not taking narcotics.  • Stairs are OK, just take it slow.                  Aurora Craven D.O  7/2/2022    Gynecologic Oncology   [address] [place] [address] [phone] office

## 2022-07-02 NOTE — PLAN OF CARE
Goal Outcome Evaluation:           Progress: improving  Outcome Evaluation: VSS, afebrile, on room air, lap sites x 5 CD&I, pain controlled with scheduled Toradol, IV fluids infusing, Zaldivar with good urine output, Zaldivar to be discontinued this AM, pt will be due to void. IS use and TCDB encouraged, scd's on, no c/o nausea this shift, scop patch in place, tolerating regular diet, will continue to monitor.

## 2022-07-05 LAB
LAB AP CASE REPORT: NORMAL
Lab: NORMAL
PATH REPORT.FINAL DX SPEC: NORMAL
PATH REPORT.GROSS SPEC: NORMAL

## 2022-07-05 NOTE — PROGRESS NOTES
Case Management Discharge Note      Final Note: Discharged home. Terri Mendoza, DWIGHT         Transportation Services  Private: Car    Final Discharge Disposition Code: 01 - home or self-care

## 2022-07-06 RX ORDER — PREDNISONE 20 MG/1
TABLET ORAL
Qty: 15 TABLET | Refills: 0 | Status: SHIPPED | OUTPATIENT
Start: 2022-07-06

## 2022-07-07 ENCOUNTER — TELEPHONE (OUTPATIENT)
Dept: GYNECOLOGIC ONCOLOGY | Age: 42
End: 2022-07-07

## 2022-07-07 NOTE — TELEPHONE ENCOUNTER
Caller: Isaiah Glass    Relationship: Self        What was the call regarding:     WANTED TO SEE IF CAN FAX DISABILITY PAPER WORK TO THE OFFICE TOMORROW AND WOULD IT BE READY TO  BY POST OP APPT 7/15    I CALLED AND SPOKE TO [employer]  SHE SAID THAT WOULD BE FINE AND TO FAX [phone] AND THEY SHOULD BE ABLE TO HAVE THAT READY BY TIME COMES IN ON 7/15    I ADVISED THIS TO ISAIAH VEGA V/U WILL FAX OVER TOMORROW

## 2022-07-11 NOTE — PROGRESS NOTES
Age: 42 y.o.  Sex: female  :  1980  MRN: [id number]       REFERRING PHYSICIAN: No ref. provider found  DATE OF VISIT: 2022          Regi Glass presents to [facility] for scheduled post-operative appointment.  Pt is s/p RATLH, bilateral salpingectomy, left oophorectomy on 22.  No complaints today, pain well controlled, tolerating diet without issue, normal BMs, no urinary complaints.   No drainage, redness, or swelling to incisions.  Pathology benign- left ovarian endometrioma.      Oncology/Hematology History Overview Note   Regi Glass is a 42 y.o. female referred by Dr. Olga Mello ([facility]) for left ovarian cysts.    • 22: Pap smear-negative  • 22: TVUS-uterus-97 x 60 x 72 mm, ET-8.9 mm, right ovary-28 x 15 x 16 mm, left ovary-66 x 55 x 45 mm, cyst-31 x 17 x 29 mm, cyst-46 x 40 x 43 mm.  • 22: TVUS-uterus-107 x 62 x 62 mm, ET-8.8 mm, right ovary-20 x 15 x 17 mm, left ovary-75 x 70 x 52 mm, cyst-61 x 53 x 47 mm, cyst-30 x 30 x 24 mm.  • 22: RATLH, bilateral salpingectomy, left oophorectomy  • 22: Pathology - Uterus, Cervix, Bilateral Fallopian Tubes and Left Ovary, Robotic Assisted Total Hysterectomy, Bilateral Salpingectomy and Left Oophorectomy (including FS):  o Left ovarian endometrioma  o Cervix with endometriosis.  o Secretory endometrium.  o Adenomyosis.  o Bilateral fallopian tubes with paratubal cysts.  o Left fallopian tube with endometriosis.  o Uterine serosa with endometriosis.        7/15/22: POST OP        Date Value Ref Range Status   2022 90.1 (H) 0.0 - 38.1 U/mL Final            Past Medical History:  Past Medical History:   Diagnosis Date   • Cytopenia    • Endometriosis 2006   • Gestational thrombocytopenia (HCC)    • Hx of folliculitis     IN THE LEFT OUTER ASPECT OF LABIA   • ITP (idiopathic thrombocytopenic purpura)     INCLUDING PLATELET CLUMPING   • Joint pain    • Left ovarian cyst    • Lupus (HCC)     • Seasonal allergies    • Thrombocytopenia (HCC)        Past Surgical History:  Past Surgical History:   Procedure Laterality Date   •  SECTION      X2   • DIAGNOSTIC LAPAROSCOPY EXPLORATORY LAPAROTOMY      1.Status post exploratory laparoscopy diagnosed with endometriosis in 2006.   • DILATATION AND CURETTAGE     • INCISION AND DRAINAGE ABSCESS      FOLLICULITIS IN THE LEFT OUTER ASPECT OF LABIA   • TOTAL LAPAROSCOPIC HYSTERECTOMY N/A 2022    Procedure: Exam under anesthesia, Robotic assisted total laparoscopic hysterectomy, bilateral salpingectomy, unilateral oophorecotmy,;  Surgeon: Aurora Craven DO;  Location: [facility] OR;  Service: Robotics - [facility];  Laterality: N/A;        MEDICATIONS:    Current Outpatient Medications:   •  cetirizine (zyrTEC) 10 MG tablet, Take  by mouth Daily., Disp: , Rfl:   •  hydroxychloroquine (PLAQUENIL) 200 MG tablet, Take 200 mg by mouth Every Night., Disp: , Rfl:   •  multivitamin with minerals tablet tablet, Take 1 tablet by mouth Daily. HOLDING FOR DOS, Disp: , Rfl:   •  predniSONE (DELTASONE) 20 MG tablet, 20mg daily x 2 days, 10mg daily x 3 days, 10mg every other day x 4 days, then off, Disp: 15 tablet, Rfl: 0    ALLERGIES:  Allergies   Allergen Reactions   • Bactrim [Sulfamethoxazole-Trimethoprim] Rash         ROS:  CONSTITUTIONAL:  Denies fever or chills.   NEUROLOGIC:  Denies headache, focal weakness or sensory changes.   EYES:  Denies change in visual acuity.  HEENT:  Denies nasal congestion or sore throat.   RESPIRATORY:  Denies cough or shortness of breath.   CARDIOVASCULAR:  Denies chest pain or edema.   GI:  Denies abdominal pain, nausea, vomiting, bloody stools or diarrhea.   :  Denies dysuria, leaking or incontinence.  MUSCULOSKELETAL:  Denies back pain or joint pain.   INTEGUMENT:  Denies rash.   ENDOCRINE:  Denies polyuria or polydipsia.   LYMPHATIC:  Denies swollen glands or lymphedema.   PSYCHIATRIC:  Denies depression or  anxiety.      PHYSICAL EXAM:  There were no vitals filed for this visit.    There is no height or weight on file to calculate BMI.    Current Status 6/20/2022   ECOG score 0     PHQ-9 Total Score:           GEN: alert and oriented x 3, normal affect, well nourished and hydrated  ABD: Soft, nontender, nondistended; incisions healing well, without erythema or drainage noted  GYN: deferred  EXT: No petechiae, bruising, rash, candida. No CCE.       Result Review :  The pertinent labs, images, and/or pathology as noted in the oncology history were reviewed independently and discussed with the patient.   Brooklyn Constantino PA-C   07/15/2022    [facility] LABS:   WBC   Date Value Ref Range Status   07/02/2022 18.09 (H) 3.40 - 10.80 10*3/mm3 Final     RBC   Date Value Ref Range Status   07/02/2022 4.37 3.77 - 5.28 10*6/mm3 Final     Hemoglobin   Date Value Ref Range Status   07/02/2022 12.4 12.0 - 15.9 g/dL Final     Hematocrit   Date Value Ref Range Status   07/02/2022 38.1 34.0 - 46.6 % Final     Platelets   Date Value Ref Range Status   07/02/2022 127 (L) 140 - 450 10*3/mm3 Final        Date Value Ref Range Status   05/31/2022 90.1 (H) 0.0 - 38.1 U/mL Final       [facility] IMAGING:  XR Chest 1 View    Result Date: 6/30/2022  No evidence for active disease in the chest.  This report was finalized on 6/30/2022 6:08 AM by Dr. Aroldo Islas M.D.              ASSESSMENT :  • Post operative assessment  o 7/1/22: RATLH, bilateral salpingectomy, left oophorectomy  o Pathology: Left ovarian endometrioma        PLAN :  • No need for further follow up  • Advised to follow up with gynecologist, as scheduled  • Continue to honor post-operative restrictions for full six weeks  • Call with questions or concerns            Brooklyn Constantino PA-C  7/11/2022    Gynecologic Oncology   [address] [place] [address]  [phone] office

## 2022-07-12 NOTE — PROGRESS NOTES
Subjective .  Patient with steroid withdrawal symptoms, now improved with steroid taper, discussed benign findings on REASONS FOR FOLLOWUP:    1. Idiopathic thrombocytopenic purpura.   2. Variable thrombocytopenia including platelet clumping. ?Gestational thrombocytopenia noted when reviewed 01/16/2012 and 02/14/2012.   3. Patient seen 01/16/2012 when 16 weeks pregnant.   4. Patient seen 05/01/2012 nearing term. Platelet count approximately 80,000.   5. Patient seen 01/24/2014, further thrombocytopenia, now associated with increasing joint pain-polyarticular joint garo n, rheumatologic assessment initiated, consult requested, and prednisone initiated 20 mg oral daily.   6. Patient status post rheumatologic assessment and results pending, platelet count responsive to oral steroids, reduced; steroid taper planned.   7. Patient seen on 02/21/2014, platelet count approximately 70,000 on 5 mg every other day per prednisone. Rheumatologic assessment ongoing. SLA(?). Mild increase in antiphospholipid antibody levels.   8. The patient seen on 06/13/2014, Plaquenil initiated for apparel sy stemic lupus erythematosus associated elevated antiphospholipid antibody, further thrombocytopenia noted in our office and to restart steroids over a 2 week period.   9. The patient seen 07/14/2014 - improvement per generalized rheumatologic symptoms from cyto penia persisting, Plaquenil continued. Recheck over the subsequent 2 months planned.   10. Patient seen 11/10/2014, platelet count approximately 50,000, pending stabilization of rheumatologic symptoms. Reassessment in 4 months planned.   11. Patient was seen on 03 /23/2015, platelet count between 40,000 and 50,000, again with stabilization of rheumatologic symptoms, reassessment in 6 months' time.   12. Patient seen 09/16/2015, platelet count of 92,000, improvement of rheumatologic symptoms, every 6 month assessments pl anned.   13. Patient seen June 01, 2016 stable, platelet count  1 or 16,000 additional rheumatologic symptoms controlled?  Length of Imuran use  14. Patient reviewed December 5, off Imuran for approximate 6 months, anticardiolipin antibodies, lupus anticoagulant, beta-2 glycoprotein antibodies rechecked  15. Patient reviewed May 31, 2017 clinically stable, platelet count acceptable, yearly follow-up planned  16. Patient reassessed 5/31/22 with bilateral ovarian cysts, plans for surgical intervention per gynecologic oncology, reassessment per antiphospholipid antibody syndrome, steroids initiated with prednisone 1 mg/kg  17. Patient seen in follow-up 6/14/2022 responding to p.o. steroids, adjusted as needed.  18. Patient seen 6/20/2022, acceptable platelet count, surgery planned 7/1/2022                The patient returns today for follow-up.  She continues on prednisone 30 mg daily.  She is tolerating prednisone well aside from occasional heartburn.  Heartburn typically occurs with meals, which is also when she has been taking her prednisone.  She currently does not take anything for heartburn but we discussed adding Pepcid to her therapy.  She also experiences occasional flushing, maybe once daily.  She is eating and drinking adequately, her weight remains stable.  Her bowels are moving regularly.  She denies insomnia.  She denies fever or chills.  She denies signs or symptoms of bleeding.  She denies nausea or vomiting.     She was seen by Dr. Craven today in advance for surgery scheduled 7/1/2022.  Her platelet count is currently adequate for the procedure the platelets will be available perioperatively.  The case was discussed with her surgeon today just before the patient being seen in office.     Patient admitted 7/1 through 7/2/2022 undergoing robotic assisted total laparoscopic hysterectomy, BSO and unilateral oophorectomy.  Pathology revealed left ovarian endometrioma, cervix with endometriosis, secretory endometrium, adenomyosis, bilateral fallopian tubes with  paratubal cysts, left fallopian tube with endometriosis and uterine serosa with endometriosis.  Unfortunately she did develop steroid withdrawal symptoms and needed to taper with an additional treatment plan which was put into place and which was effective.  She seen back in office 2022 feeling considerably better we plan further steroid taper.      HEMATOLOGY HISTORY:  The patient is now a 42-year-old female with a history of ITP and variable thrombocytopenia also associated with platelet clumping and gestational thrombocytopenia. The patient was seen during her pregnancy 2012,  and again 2012 at approximately 27 weeks, platelet count 70,000-80,000. She was admitted to [facility] with a platelet count of 1000 along with red purpura and petechiae. She has a history of thrombocytopenia dating to  when she was pregnant with a platelet count in the 50,000 range. She was also treated throughout her pregnancy with Lovenox at prophylactic doses due to prior miscarriages. She tolerated this well with no bleeding complications, platelet count appar ently spontaneously improved to the 80,000 range, eventually was 95,000 at the time of .   Approximately 10 years prior to hospitalization here at [facility], she had an area of folliculitis in the left outer aspect of labia requi ring incision and drainage treated empirically with antibiotics with Bactrim and doxycycline which she received over 10 days.   On the morning of admission she noted development of diffuse erythematous rash on her abdomen and back as well as lower extremities and buccal mucosa. She presented to Dr. Lee in [place] and was found to have a white count of 5000, hemoglobin 14.2 a n d platelet count of 1000. She was transferred to [facility] for admission and treatment for presumed ITP. Admitting studies include a CRP of .6, ANTONI 1:80 positive, , IPF  31.8, initial CBC with platelet count of 2000, H and H 13.9 , 40.7, WBC 4140. She was placed on Decadron orally and IVIG. Sedimentation rate was found to be 8 and on the 24th IPF was still 31.7. AFO screening was also positive incidentally and CMV antibody IgG was greater than 13.2, IgM .28, EBV antibodies were a lso considerably high, EBV antibody/nuclear antigen greater than 600 and EBV antibodies ECA IgG of 450 with an EBV antibody early IgG of 17. All of these are excessively high. The patient did fortunately respond however, by the 26th platelet count was u p to 84,000, IPF 6.7. It was elected at this point for the patient to be discharged home and followed back in the office with weekly counts and tapered steroids thereafter.   She had her counts done each week including 12/13/2010 at which time she was 201 ,000 and when seen on 12/20/2010, platelet count was 136,000. In reviewing her circumstances on that day, she went up to 30 mg of steroids and had her cut back very slowly on a weekly basis.   She returned on 01/17/2011, feeling well. Her IPF is also at normal levels today. Additional studies have been done to be certain that she does have additional hypercoagulable state as well including lupus anticoagulant including lipid antibody screening. She has had both Pneumovax and meningococcal vaccine but no Haemophilus.   The patient presented back to the office 02/14/2011 after tapering off steroids and has been off of them for 1 week. Fortunately she remained relatively stable with platelet count 116,000 and we elected to follow her on an every other we ek basis. Fortunately her subsequent counts have been stable. She has been able to complete her vaccinations as necessary. Her subsequent testing here has shown platelets in the 205,000 range on 03/28/2011, 168,000 on 04/11/2011 and 148 on 04/18/2011. She continues to feel well otherwise and has agreed that she would have monthly checks for a period of time  longer for at least a 6 month period from her hospitalization.   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 She may have had a viral illness as of late. Her counts checked 05/17/2011 with a platelet count of 198,000, 06/13/2011 of 138,000. As she returns it i s clear that her platelet count is actually lower into the near 90,000 range. We discussed this in part as possibly not necessarily related to ICP since her IPF is actually quite low and her smears do not show enlarged platelets.   As a result of the ab ove the patient was asked to have counts done closer to home and these were done at every two week intervals with a considerable variation seen. This includes on 08/03 of 146,000, 08/10 of 168,000, 08/17 of 113,000, 8/24 of 96,000 and today 106,000 here in the office. As a result of these findings in its variability thereof suggests that perhaps the patient has platelet clumping ongoing, and today we will test her for this as well.   The patient thereafter continued her usual lifestyle. She and her [facility] nd had made plans for her to try to become pregnant and she did quite quickly do so. Thereafter she now sees Dr. Vic Lock, high-risk obstetrics, and her platelet count checked there approximately every other week has been dropping. She is now seen back in our office and actually has a platelet count of 56,000 with an IPF of 7. A peripheral smear is currently pending.   As result of review 01/16/2012 the patient had multifactorial reasons for her thrombocytopenia including ITP, likely additional pl atelet clumping, and? gestational thrombocytopenia. In any case, she was reasonably stable at that point and we elected to follow her counts every other week. She returns back today having done this. She was also placed on aspirin by her high-risk OB a n d she is having  increasing gum bleeding likely as a result of several reasons now. Her most recent platelet counts including 84,000 on 2012 and 68,000 when seen today, 2012. IPF interestingly is 4.7. Her pregnancy has otherwise gone well wi th no additional issues thus far. She is approximately 20 weeks' gestation.   The patient was asked to be checked at her family physicians office. She also followed up with her high risk OB and has platelet count ranging from 60,000 to as much as 80,000. Today when seen her platelet count is 79,000, IPF of 6.5. I discussed with Dr. Tomi Lock her high risk OB and plans were to try to hold off steroids unless she is below 50,000 wherein she would also discontinue aspirin. When seen today the pregn leanne has progressed nicely without any complication thus far. It is not clear if her delivery date is to be scheduled, but one expects that it might well be.   The patient's case was discussed with Dr. Tomi Lock with plans for her to again hold off steroids until she is below 50,000 at which time she will discontinue aspirin.   When seen again on 2012, pregnancy has progressed nicely and she is to see Dr. Tomi Lcok in approximately 2 weeks. It is likely that she will undergo .   The patient did proceed onto steroids just prior to her pregnancy and fortunately did quite well peripartum. The patient had not been seen since that time approximately a year when she is now seen back 2013. She had presented to her PCP' s office jus t recently with abdominal pain, slowly worsening without nausea, vomiting or change in bowel habits. She underwent CT scan of the abdomen, pelvis without abnormalities noted though her blood count was somewhat suspicious with an elevated white count to 18 , 000 and platelet count down to 33,000. Normal hemoglobin and hematocrit 14.3 and 41.97. Patient rechecked two days later with platelet count of 43,000 and white count apparently  normal? She now presents back to our practice for reassessment feeling much b tereza per her abdominal pain with a normal performance status, otherwise no change in bowel function.   The patient thereafter was asked to be seen back and now presents 01/24/2014. She has been noticing in the last several weeks to months, pain in multiple joints including right hip, left shoulder and the bilateral knees. She notes a general stiffness throughout multiple joints in the a.m. as well. This is a new finding for her and she is quite troubled by it. She was undergoing assessment for it recently with additional laboratory studies done including 10/10/2013 with a platelet count found to be reduced to 86,000.   Additional laboratory results done also in [place] in June 2013 include normal serum chemistries; platelet count now is 53,000, WBC 8700, he moglobin and hematocrit 14.6, and 43.6. ANTONI titer positive, RA rheumatoid factor of 7, ASO of 137, uric acid 3.7. The patient was thereafter referred back to our practice for her thrombocytopenia. She feels well today except for again a degree of joint d iscomfort in the distribution as described above.   The patient as a result of the above and suspicions for rheumatologic disease was asked to undergo a series of studies. This included an ANTONI comprehensive panel that revealed anti-double-stranded DNA elevated at 21. Additionally, her RA panel suggested e a rly rheumatoid arthritis with a positive anti-CCP. As the patient's studies with Dr. Gonzáles however were not yet completed at the time she is seen back in our office. The patient at this point had continued steroids though when seen back in the office 01/3 1 /2014 her platelet count was 199,000. She was asked to taper prednisone down to 10 mg daily and when seen 02/07/2014 her platelet count is 89,000. The patient feels well overall today when seen. The joint discomfort has improved remarkably on the steroi d doses.   Patient seen on  02/21/2014 platelet count approximately 70,000 on 5 mg every other day per prednisone. Rheumatologic assessment ongoing mild increase in antiphospholipid antibody level.   The patient, after last being seen, was asked to drop predn isone down to 5 mg daily and then 5 mg every other day. She is now seeing Dr. Minoo Gonzáles who is assessing her as well and we sent additional laboratory studies, demonstrating mild elevation in antiphospholipid antibodies as well as glycoprotein antibody . It was also noted that her TTP level was up to 63. Ms. Glass indicates that she is being considered for potentially lupus as an underlying diagnosis. Dr. Gonzáles would like to check her off steroids and thus we discussed tapering off further at this po int with platelet count is now reasonably acceptable.   The patient thereafter came off steroids and has been doing relatively well. As she now however, presents back it is recognized that her platelet count has been slowly dropping. This includes the 05/27 /2014 assessment with a platelet count of 30,000 with assessment on 06/11/2014 at 25,000 and today when seen on 06/13/2014 the platelet count is 20,000 with IPF 14.5. The patient states she is having increasing joint pain but has had no evidence of ecchym oses or petechial lesions and no additional gingival bleeding, epistaxis or vaginal bleeding. She in fact feels reasonably good except for the joint pain that she feels is manageable. She is, however, concerned about her platelet count and rightly so.   The patient thereafter was started on Plaquenil through Rheumatology and over the next several weeks, she had felt somewhat better. In fact when she is seen back today, 07/14/2014, she no longer has joint discomfort. She does have thrombocytopenia, however, a t approximately the same levels as she did previously. She had been on steroids in the last several weeks as they were tapered off. This was revealed when her platelet count was  23,000 three weeks ago and is recognized today. Today, however, her platelet count is 28,000. She has had no additional bleeding issues however, since last seen.   The patient, after being reviewed in July 2014, was asked to followup with plans for her to remain on Plaquenil, initiate Imuran which began in August. She stayed on th e medication, fortunately tolerating it well and as she was seen back today, 11/17/2014, overall feels as if she is having fewer rheumatologic symptoms. Unfortunately, hematologically, she remains [id number] with her platelet count at close to 60, 000.   The patient thereafter has followed up with rheumatology on an every 2 month basis and now returned back here 4 months from previous on 03/23/2015. Her symptoms for her rheumatologic disorder remains under control though her Imuran has been moved to 150 mg a day as well as her Plaquenil, maintained at the same dose. She feels reasonably good overall today without any new symptoms, but concerned about her platelet count that was close to 40,000 when reviewed today. The patient has had no bleeding in g ums, epistaxis, GI or  tract.   The patient is seen back in followup and indicates that her rheumatologic symptoms remain under control with Imuran and Plaquenil. We find wonderfully enough that her platelet count is also improved to approximately 100,000 when seen in the office today as well.   Patient is now next reviewed June 01, 2016.  She is feeling well overall without additional rheumatologic symptoms.  She has been on Imuran for approximately 2 years and is a question of how long she'll need to stay on it.  She fortunately is hematologically stable when reviewed today platelet count of 116,000, and otherwise normal CBC     The patient is now reviewed December 50,016.  As a result of running out of her Imuran not having it refilled by a rheumatologist she has remained off Imuran.  She, fortunately, has not had any additional  symptoms including rash development and/or joint discomfort.  We discussed rechecking her anticardiolipin antibodies, beta-2 glycoprotein antibodies and lupus anticoagulant at this point since she has a further degree of thrombocytopenia also present today.    The patient's subsequent laboratory studies were otherwise negative and she is now seen back May 31, 2017.  She continues to feel well without any joint disturbance but she does note bilateral malar rash which she tends to cover with makeup.    The patient presented February 18, 2022 to [facility] for preventive examination at this point having fairly heavy periods-?  Ablation or hysterectomy.Subsequent CBC including H&H of 13.3 and 41.4 white count of 9200, MCV 82.8, MCH of 26.6 and platelet count of 68,000, normal automated differential. Subsequent pelvic ultrasound revealed homogenous appearance to endometrium per uterus, endometrial borders well-defined, no intracavitary mass, normal endometrial thickness, normal cervix, right ovary normal, left ovary of 2 cysts 1 at just over 60 mm and possible endometrioma and 1 at just over 30 mm also possible endometrioma.  Unfortunately by late April 2022 her degree of menorrhagia was worsening and she was referred to Dr. Craven who felt she should undergo a laparoscopic hysterectomy, bilateral salpingo-oophorectomy unilateral oophorectomy and possible staging possible laparotomy.  She is now referred back to try to improve her platelet count prior to any surgical procedure.     The patient is seen 5/31/2022 indicating that she has been routinely assessed by rheumatology at every 6 months while she is remained on Plaquenil undergoing laboratory studies and ophthalmologic assessments every 6 months.  She has had no issues until recently indicating her platelet count has been between 65 and 80,000.  She understands the above issues and the need to try to bring her platelet count up prior to surgical intervention by  Dr. Craven.  She has had no symptoms, however, of progressive rheumatologic disease including lack of Raynaud's, further malar rash, joint discomfort, shortness of breath, cough, abdominal pain, change in bowel habit, joint swelling or neuropathy.  Additionally she has had no pelvic discomfort.    Patient responded quickly to p.o. steroids at 1 mg/kg with repeat CBC 6/7/2022 and platelet count of 273,000, IPF now reduced to 3.6, previous  level 90.1.  Patient was dropped to 40 mg daily next seen 6/14/2022 with platelet count 197,000, prednisone dropped to 30 mg daily and plans to reassess 6/20/2022.    The patient seen 6/20/2022 feeling well, platelet count at 111,000, IPF at 4.0.  Her surgery is now scheduled 7/1/2022 we plan to continue her current dosing check and her platelet count preoperatively 6/29/2022.    Patient's assessment 6/29 included a platelet count of 1 13,000 she was admitted 7/1 through 7/2/2022 undergoing robotic assisted total laparoscopic hysterectomy, BSO and unilateral oophorectomy.  Pathology revealed left ovarian endometrioma, cervix with endometriosis, secretory endometrium, adenomyosis, bilateral fallopian tubes with paratubal cysts, left fallopian tube with endometriosis and uterine serosa with endometriosis.    She is next seen back 7/13/2022.  She continues to recover postoperatively and we discussed a further steroid taper.        Past Medical History:   Diagnosis Date   • Cytopenia    • Endometriosis 2006   • Gestational thrombocytopenia (HCC)    • Hx of folliculitis     IN THE LEFT OUTER ASPECT OF LABIA   • ITP (idiopathic thrombocytopenic purpura)     INCLUDING PLATELET CLUMPING   • Joint pain    • Left ovarian cyst    • Lupus (HCC)    • Seasonal allergies    • Thrombocytopenia (HCC)      ONCOLOGIC HISTORY:  (History from previous dates can be found in the separate document.)    Current Outpatient Medications on File Prior to Visit   Medication Sig Dispense Refill   •  "cetirizine (zyrTEC) 10 MG tablet Take  by mouth Daily.     • hydroxychloroquine (PLAQUENIL) 200 MG tablet Take 200 mg by mouth Every Night.     • multivitamin with minerals tablet tablet Take 1 tablet by mouth Daily. HOLDING FOR DOS     • predniSONE (DELTASONE) 20 MG tablet 20mg daily x 2 days, 10mg daily x 3 days, 10mg every other day x 4 days, then off 15 tablet 0     No current facility-administered medications on file prior to visit.     ALLERGIES:     Allergies   Allergen Reactions   • Bactrim [Sulfamethoxazole-Trimethoprim] Rash       Social History     Socioeconomic History   • Marital status:    Tobacco Use   • Smoking status: Never Smoker   • Smokeless tobacco: Never Used   Vaping Use   • Vaping Use: Never used   Substance and Sexual Activity   • Alcohol use: Yes     Comment: SOCIALLY   • Drug use: No         Cancer-related family history includes Testicular cancer in her brother. There is no history of Cancer.     Review of Systems  A comprehensive 14 point review of systems was performed and was negative except as mentioned.    Objective     Vitals:    07/13/22 0801   BP: 104/74   Pulse: 101   Resp: 16   Temp: 97.1 °F (36.2 °C)   TempSrc: Temporal   SpO2: 100%   Weight: 54.4 kg (120 lb)   Height: 160 cm (62.99\")   PainSc: 0-No pain     Current Status 7/13/2022   ECOG score 0     Physical Exam    GENERAL: Well-developed, well-nourished female in no acute distress.   SKIN: Warm, dry without rashes, purpura or petechiae.   HEAD: Normocephalic.   EYES: Pupils equal, round and reactive to light. EOMs intact. Conjunctivae normal.   EARS: Hearing intact.   NOSE: Septum midline. No excoriations or nasal discharge.   MOUTH: Tongue is well-papillated; no stomatitis or ulcers. Lips normal.   THROAT: Oropharynx with occasional whitish plaque?  Early thrush  NECK: Supple with good range of motion; no thyromegaly or masses, no JVD or bruits.   LYMPHATICS: No cervical, supraclavicular, axillary or inguinal " adenopathy.   CHEST: Lungs clear to percussion and auscultation.   CARDIAC: Regular rate and rhythm without murmurs, rubs or gallops.   ABDOMEN: Soft, nontender with no organomegaly or masses.   EXTREMITIES: No clubbing, cyanosis or edema.   NEUROLOGICAL: No focal neurological deficits.   RECENT LABS:  Hematology WBC   Date Value Ref Range Status   07/13/2022 14.21 (H) 3.40 - 10.80 10*3/mm3 Final     RBC   Date Value Ref Range Status   07/13/2022 4.77 3.77 - 5.28 10*6/mm3 Final     Hemoglobin   Date Value Ref Range Status   07/13/2022 13.5 12.0 - 15.9 g/dL Final     Hematocrit   Date Value Ref Range Status   07/13/2022 40.6 34.0 - 46.6 % Final     Platelets   Date Value Ref Range Status   07/13/2022 112 (L) 140 - 450 10*3/mm3 Final        Assessment & Plan   *ITP  · 42-year-old female with a history of idiopathic thrombocytopenic purpura during previous pregnancy.  · In addition, she has had a degree of pseudothrombocytopenia noted on periodic blood smear and likely a component of gestational thrombocytopenia previously.   · She had more recently a relapse of ITP, improved on steroid dosing. Her symptoms suggested she be tested further for rheumatologic disorder. She was seen by Dr. Gonzáles who diagnosed systemic lupus erythematosus, which evidently includes a component of antiphospholipid antibody syndrome as well.   · The patient was placed on Plaquenil and later Imuran, to which she continues to respond.   · Her platelet count had been noted to improved somewhat when she was seen in June of this year.    · Now which is reviewed in December she continues to do well clinically though her platelet count has dropped modestly with a very minimally elevated IPF.  It was suggested rechecking her anticardiolipin antibodies, beta-2 glycoprotein antibodies and lupus anticoagulant today while she remained off Imuran.  These studies were negative.  · The patient presented February 18, 2022 to [facility] for preventive  examination at this point having fairly heavy periods-?  Ablation or hysterectomy.  Subsequent CBC including H&H of 13.3 and 41.4 white count of 9200, MCV 82.8, MCH of 26.6 and platelet count of 68,000, normal automated differential.   · Unfortunately by late April 2022 her degree of menorrhagia was worsening and she was referred to Dr. Craven who felt she should undergo a laparoscopic hysterectomy, bilateral salpingo-oophorectomy unilateral oophorectomy and possible staging possible laparotomy.  She is now referred back to try to improve her platelet count prior to any surgical procedure.  · The patient is seen 5/31/2022.  She has had no issues until recently indicating her platelet count has been between 65 and 80,000.  She was initiated on prednisone 20 mg 3 times daily (40 mg at breakfast, 20 mg in the afternoon).  · 6/7/2022, platelet count today has significantly improved, now normal at 273,000.  Discussed with Dr. Eastman, patient will be cleared from our standpoint for surgery with Dr. Craven.  Because she responded so well to prednisone, we will reduce dose to 40 mg.  She will return in 1 week for CBC and MD follow-up to potentially continue to taper prednisone.  · Patient seen 6/14/2022 with platelet count of 197,000, prednisone dropped to 30 mg daily, follow-up assessment 6/20/2022 same day as gynecologic oncology.  · Patient assessed 6/20/2022 with platelet count of 1 11,000, normal IPF, prednisone 30 mg daily continued  · Upon completion of surgery patient came off of steroids but unfortunately experience steroid withdrawal requiring a steroid taper which we put into place at 20 mg followed by 10 mg followed by every other day dosing.  · Patient seen 7/13/2022 with acceptable platelet count, prednisone reduced to 5 mg every other day for 4 days then discontinue.  Follow-up assessments at 2 and 4 months planned.    *Systemic lupus erythematosus  · She was seen by Dr. Gonzáles who diagnosed systemic lupus  erythematosus, which evidently includes a component of antiphospholipid antibody syndrome as well.   · The patient was placed on Plaquenil and later Imuran, to which she continues to respond.   · It was suggested rechecking her anticardiolipin antibodies, beta-2 glycoprotein antibodies and lupus anticoagulant today while she remained off Imuran.  These studies were negative.  · She continues to follow with rheumatology every 6 months while she is on Plaquenil.  She undergoes every 6-month ophthalmologic assessment and lab assessment.    *Menorrhagia  · The patient presented February 18, 2022 to [facility] for preventive examination at this point having fairly heavy periods-?  Ablation or hysterectomy.  · Subsequent pelvic ultrasound revealed homogenous appearance to endometrium per uterus, endometrial borders well-defined, no intracavitary mass, normal endometrial thickness, normal cervix, right ovary normal, left ovary of 2 cysts 1 at just over 60 mm and possible endometrioma and 1 at just over 30 mm also possible endometrioma.    · Unfortunately by late April 2022 her degree of menorrhagia was worsening and she was referred to Dr. Craven who felt she should undergo a laparoscopic hysterectomy, bilateral salpingo-oophorectomy unilateral oophorectomy and possible staging possible laparotomy.  She is now referred back to try to improve her platelet count prior to any surgical procedure.  · Ca1 25 elevated at 90.1 indicating that patient needs to have cysts removed.  Cyst themselves can elevate this marker.  We will work to improve platelet count so patient can have procedure.  · Patient will now be cleared for surgery from oncology standpoint, his platelets have returned to normal at 273,000.  · Patient subsequently admitted 7/1 through 7/2/2022 undergoing robotic assisted total laparoscopic hysterectomy, BSO and unilateral oophorectomy.  Pathology revealed left ovarian endometrioma, cervix with endometriosis,  secretory endometrium, adenomyosis, bilateral fallopian tubes with paratubal cysts, left fallopian tube with endometriosis and uterine serosa with endometriosis.      Plan:   · Continue prednisone  10 mg a day then 5 mg every other day for 4 days then discontinue.  · Start Pepcid 20 mg for heartburn while on prednisone.  · Nystatin 5 cc p.o. 4 times daily now as needed in the discontinue  · 2 months CBC IPF, RN evaluation, 4 months MD, ONEL, IPF  Jerad Eastman MD  07/13/22

## 2022-07-13 ENCOUNTER — OFFICE VISIT (OUTPATIENT)
Dept: ONCOLOGY | Facility: CLINIC | Age: 42
End: 2022-07-13

## 2022-07-13 ENCOUNTER — LAB (OUTPATIENT)
Dept: LAB | Facility: HOSPITAL | Age: 42
End: 2022-07-13

## 2022-07-13 VITALS
OXYGEN SATURATION: 100 % | BODY MASS INDEX: 21.26 KG/M2 | HEART RATE: 101 BPM | RESPIRATION RATE: 16 BRPM | WEIGHT: 120 LBS | HEIGHT: 63 IN | DIASTOLIC BLOOD PRESSURE: 74 MMHG | TEMPERATURE: 97.1 F | SYSTOLIC BLOOD PRESSURE: 104 MMHG

## 2022-07-13 DIAGNOSIS — D69.3 IDIOPATHIC THROMBOCYTOPENIC PURPURA: ICD-10-CM

## 2022-07-13 DIAGNOSIS — D68.61 ANTIPHOSPHOLIPID ANTIBODY SYNDROME: Primary | ICD-10-CM

## 2022-07-13 DIAGNOSIS — D68.61 ANTIPHOSPHOLIPID ANTIBODY SYNDROME: ICD-10-CM

## 2022-07-13 LAB
BASOPHILS # BLD AUTO: 0.07 10*3/MM3 (ref 0–0.2)
BASOPHILS NFR BLD AUTO: 0.5 % (ref 0–1.5)
DEPRECATED RDW RBC AUTO: 38.1 FL (ref 37–54)
EOSINOPHIL # BLD AUTO: 0.14 10*3/MM3 (ref 0–0.4)
EOSINOPHIL NFR BLD AUTO: 1 % (ref 0.3–6.2)
ERYTHROCYTE [DISTWIDTH] IN BLOOD BY AUTOMATED COUNT: 12.3 % (ref 12.3–15.4)
HCT VFR BLD AUTO: 40.6 % (ref 34–46.6)
HGB BLD-MCNC: 13.5 G/DL (ref 12–15.9)
IMM GRANULOCYTES # BLD AUTO: 0.28 10*3/MM3 (ref 0–0.05)
IMM GRANULOCYTES NFR BLD AUTO: 2 % (ref 0–0.5)
LYMPHOCYTES # BLD AUTO: 1.91 10*3/MM3 (ref 0.7–3.1)
LYMPHOCYTES NFR BLD AUTO: 13.4 % (ref 19.6–45.3)
MCH RBC QN AUTO: 28.3 PG (ref 26.6–33)
MCHC RBC AUTO-ENTMCNC: 33.3 G/DL (ref 31.5–35.7)
MCV RBC AUTO: 85.1 FL (ref 79–97)
MONOCYTES # BLD AUTO: 1.17 10*3/MM3 (ref 0.1–0.9)
MONOCYTES NFR BLD AUTO: 8.2 % (ref 5–12)
NEUTROPHILS NFR BLD AUTO: 10.64 10*3/MM3 (ref 1.7–7)
NEUTROPHILS NFR BLD AUTO: 74.9 % (ref 42.7–76)
NRBC BLD AUTO-RTO: 0 /100 WBC (ref 0–0.2)
PLATELET # BLD AUTO: 112 10*3/MM3 (ref 140–450)
PMV BLD AUTO: 10.6 FL (ref 6–12)
RBC # BLD AUTO: 4.77 10*6/MM3 (ref 3.77–5.28)
WBC NRBC COR # BLD: 14.21 10*3/MM3 (ref 3.4–10.8)

## 2022-07-13 PROCEDURE — 36415 COLL VENOUS BLD VENIPUNCTURE: CPT

## 2022-07-13 PROCEDURE — 85025 COMPLETE CBC W/AUTO DIFF WBC: CPT

## 2022-07-13 PROCEDURE — 99214 OFFICE O/P EST MOD 30 MIN: CPT | Performed by: INTERNAL MEDICINE

## 2022-07-15 ENCOUNTER — OFFICE VISIT (OUTPATIENT)
Dept: GYNECOLOGIC ONCOLOGY | Facility: CLINIC | Age: 42
End: 2022-07-15

## 2022-07-15 VITALS
TEMPERATURE: 97.3 F | HEIGHT: 63 IN | DIASTOLIC BLOOD PRESSURE: 67 MMHG | SYSTOLIC BLOOD PRESSURE: 102 MMHG | HEART RATE: 105 BPM | WEIGHT: 120 LBS | BODY MASS INDEX: 21.26 KG/M2 | OXYGEN SATURATION: 100 %

## 2022-07-15 DIAGNOSIS — N80.129 ENDOMETRIOMA OF OVARY: Primary | ICD-10-CM

## 2022-07-15 PROCEDURE — 99024 POSTOP FOLLOW-UP VISIT: CPT | Performed by: PHYSICIAN ASSISTANT

## 2022-08-30 ENCOUNTER — TELEPHONE (OUTPATIENT)
Dept: GYNECOLOGIC ONCOLOGY | Facility: CLINIC | Age: 42
End: 2022-08-30

## 2022-08-30 NOTE — TELEPHONE ENCOUNTER
Patient called in with concerns of light spotting. Denies pain, passing clots or luis armando red blood that fills a pad.     Advised patient that light spotting is normal while her vaginal cuff incision heals and with increased activity, spotting may happen.     Encouraged patient to call if symptoms change and we will schedule a follow up.

## 2022-09-07 ENCOUNTER — LAB (OUTPATIENT)
Dept: LAB | Facility: HOSPITAL | Age: 42
End: 2022-09-07

## 2022-09-07 DIAGNOSIS — D68.61 ANTIPHOSPHOLIPID ANTIBODY SYNDROME: ICD-10-CM

## 2022-09-07 DIAGNOSIS — D69.3 IDIOPATHIC THROMBOCYTOPENIC PURPURA: ICD-10-CM

## 2022-09-07 LAB
BASOPHILS # BLD AUTO: 0.03 10*3/MM3 (ref 0–0.2)
BASOPHILS NFR BLD AUTO: 0.4 % (ref 0–1.5)
DEPRECATED RDW RBC AUTO: 37.4 FL (ref 37–54)
EOSINOPHIL # BLD AUTO: 0.13 10*3/MM3 (ref 0–0.4)
EOSINOPHIL NFR BLD AUTO: 1.7 % (ref 0.3–6.2)
ERYTHROCYTE [DISTWIDTH] IN BLOOD BY AUTOMATED COUNT: 12.1 % (ref 12.3–15.4)
HCT VFR BLD AUTO: 42.2 % (ref 34–46.6)
HGB BLD-MCNC: 14.4 G/DL (ref 12–15.9)
IMM GRANULOCYTES # BLD AUTO: 0.05 10*3/MM3 (ref 0–0.05)
IMM GRANULOCYTES NFR BLD AUTO: 0.6 % (ref 0–0.5)
LYMPHOCYTES # BLD AUTO: 1.99 10*3/MM3 (ref 0.7–3.1)
LYMPHOCYTES NFR BLD AUTO: 25.4 % (ref 19.6–45.3)
MCH RBC QN AUTO: 29.1 PG (ref 26.6–33)
MCHC RBC AUTO-ENTMCNC: 34.1 G/DL (ref 31.5–35.7)
MCV RBC AUTO: 85.3 FL (ref 79–97)
MONOCYTES # BLD AUTO: 0.56 10*3/MM3 (ref 0.1–0.9)
MONOCYTES NFR BLD AUTO: 7.1 % (ref 5–12)
NEUTROPHILS NFR BLD AUTO: 5.08 10*3/MM3 (ref 1.7–7)
NEUTROPHILS NFR BLD AUTO: 64.8 % (ref 42.7–76)
NRBC BLD AUTO-RTO: 0 /100 WBC (ref 0–0.2)
PLATELET # BLD AUTO: 77 10*3/MM3 (ref 140–450)
PLATELETS.RETICULATED NFR BLD AUTO: 7 % (ref 0.9–6.5)
PMV BLD AUTO: 11.1 FL (ref 6–12)
RBC # BLD AUTO: 4.95 10*6/MM3 (ref 3.77–5.28)
WBC NRBC COR # BLD: 7.84 10*3/MM3 (ref 3.4–10.8)

## 2022-09-07 PROCEDURE — 36415 COLL VENOUS BLD VENIPUNCTURE: CPT

## 2022-09-07 PROCEDURE — 85055 RETICULATED PLATELET ASSAY: CPT

## 2022-09-07 PROCEDURE — 85025 COMPLETE CBC W/AUTO DIFF WBC: CPT

## 2022-11-02 ENCOUNTER — OFFICE VISIT (OUTPATIENT)
Dept: ONCOLOGY | Facility: CLINIC | Age: 42
End: 2022-11-02

## 2022-11-02 ENCOUNTER — LAB (OUTPATIENT)
Dept: LAB | Facility: HOSPITAL | Age: 42
End: 2022-11-02

## 2022-11-02 VITALS
BODY MASS INDEX: 21.93 KG/M2 | TEMPERATURE: 97.1 F | WEIGHT: 123.8 LBS | HEIGHT: 63 IN | DIASTOLIC BLOOD PRESSURE: 76 MMHG | HEART RATE: 84 BPM | RESPIRATION RATE: 16 BRPM | SYSTOLIC BLOOD PRESSURE: 110 MMHG | OXYGEN SATURATION: 99 %

## 2022-11-02 DIAGNOSIS — D68.61 ANTIPHOSPHOLIPID ANTIBODY SYNDROME: Primary | ICD-10-CM

## 2022-11-02 DIAGNOSIS — D69.3 IDIOPATHIC THROMBOCYTOPENIC PURPURA: ICD-10-CM

## 2022-11-02 DIAGNOSIS — D68.61 ANTIPHOSPHOLIPID ANTIBODY SYNDROME: ICD-10-CM

## 2022-11-02 LAB
BASOPHILS # BLD AUTO: 0.05 10*3/MM3 (ref 0–0.2)
BASOPHILS NFR BLD AUTO: 0.6 % (ref 0–1.5)
DEPRECATED RDW RBC AUTO: 34.6 FL (ref 37–54)
EOSINOPHIL # BLD AUTO: 0.11 10*3/MM3 (ref 0–0.4)
EOSINOPHIL NFR BLD AUTO: 1.2 % (ref 0.3–6.2)
ERYTHROCYTE [DISTWIDTH] IN BLOOD BY AUTOMATED COUNT: 11.5 % (ref 12.3–15.4)
HCT VFR BLD AUTO: 44.8 % (ref 34–46.6)
HGB BLD-MCNC: 14.9 G/DL (ref 12–15.9)
IMM GRANULOCYTES # BLD AUTO: 0.03 10*3/MM3 (ref 0–0.05)
IMM GRANULOCYTES NFR BLD AUTO: 0.3 % (ref 0–0.5)
LYMPHOCYTES # BLD AUTO: 2.36 10*3/MM3 (ref 0.7–3.1)
LYMPHOCYTES NFR BLD AUTO: 26.3 % (ref 19.6–45.3)
MCH RBC QN AUTO: 27.8 PG (ref 26.6–33)
MCHC RBC AUTO-ENTMCNC: 33.3 G/DL (ref 31.5–35.7)
MCV RBC AUTO: 83.6 FL (ref 79–97)
MONOCYTES # BLD AUTO: 0.78 10*3/MM3 (ref 0.1–0.9)
MONOCYTES NFR BLD AUTO: 8.7 % (ref 5–12)
NEUTROPHILS NFR BLD AUTO: 5.65 10*3/MM3 (ref 1.7–7)
NEUTROPHILS NFR BLD AUTO: 62.9 % (ref 42.7–76)
NRBC BLD AUTO-RTO: 0 /100 WBC (ref 0–0.2)
PLATELET # BLD AUTO: 83 10*3/MM3 (ref 140–450)
PLATELETS.RETICULATED NFR BLD AUTO: 4.9 % (ref 0.9–6.5)
PMV BLD AUTO: 10.7 FL (ref 6–12)
RBC # BLD AUTO: 5.36 10*6/MM3 (ref 3.77–5.28)
WBC NRBC COR # BLD: 8.98 10*3/MM3 (ref 3.4–10.8)

## 2022-11-02 PROCEDURE — 85055 RETICULATED PLATELET ASSAY: CPT

## 2022-11-02 PROCEDURE — 85025 COMPLETE CBC W/AUTO DIFF WBC: CPT

## 2022-11-02 PROCEDURE — 36415 COLL VENOUS BLD VENIPUNCTURE: CPT

## 2022-11-02 PROCEDURE — 99213 OFFICE O/P EST LOW 20 MIN: CPT | Performed by: INTERNAL MEDICINE

## 2022-11-02 NOTE — PROGRESS NOTES
Subjective .  Patient with steroid withdrawal symptoms, now improved with steroid taper, discussed benign findings on pathology studies postsurgery   REASONS FOR FOLLOWUP:    1. Idiopathic thrombocytopenic purpura.   2. Variable thrombocytopenia including platelet clumping. ?Gestational thrombocytopenia noted when reviewed 01/16/2012 and 02/14/2012.   3. Patient seen 01/16/2012 when 16 weeks pregnant.   4. Patient seen 05/01/2012 nearing term. Platelet count approximately 80,000.   5. Patient seen 01/24/2014, further thrombocytopenia, now associated with increasing joint pain-polyarticular joint garo n, rheumatologic assessment initiated, consult requested, and prednisone initiated 20 mg oral daily.   6. Patient status post rheumatologic assessment and results pending, platelet count responsive to oral steroids, reduced; steroid taper planned.   7. Patient seen on 02/21/2014, platelet count approximately 70,000 on 5 mg every other day per prednisone. Rheumatologic assessment ongoing. SLA(?). Mild increase in antiphospholipid antibody levels.   8. The patient seen on 06/13/2014, Plaquenil initiated for apparel sy stemic lupus erythematosus associated elevated antiphospholipid antibody, further thrombocytopenia noted in our office and to restart steroids over a 2 week period.   9. The patient seen 07/14/2014 - improvement per generalized rheumatologic symptoms from cyto penia persisting, Plaquenil continued. Recheck over the subsequent 2 months planned.   10. Patient seen 11/10/2014, platelet count approximately 50,000, pending stabilization of rheumatologic symptoms. Reassessment in 4 months planned.   11. Patient was seen on 03 /23/2015, platelet count between 40,000 and 50,000, again with stabilization of rheumatologic symptoms, reassessment in 6 months' time.   12. Patient seen 09/16/2015, platelet count of 92,000, improvement of rheumatologic symptoms, every 6 month assessments pl anned.   13. Patient seen June  "01, 2016 stable, platelet count 1 or 16,000 additional rheumatologic symptoms controlled?  Length of Imuran use  14. Patient reviewed December 5, off Imuran for approximate 6 months, anticardiolipin antibodies, lupus anticoagulant, beta-2 glycoprotein antibodies rechecked  15. Patient reviewed May 31, 2017 clinically stable, platelet count acceptable, yearly follow-up planned  16. Patient reassessed 5/31/22 with bilateral ovarian cysts, plans for surgical intervention per gynecologic oncology, reassessment per antiphospholipid antibody syndrome, steroids initiated with prednisone 1 mg/kg  17. Patient seen in follow-up 6/14/2022 responding to p.o. steroids, adjusted as needed.  18. Patient seen 6/20/2022, acceptable platelet count, surgery planned 7/1/2022  19. Patient status post laparoscopic hysterectomy July 2022 with benign findings  20. Reassessment 11/2/2022-repeat CBC at acceptable levels.  Again review of pathology with benign findings-endometriosis                The patient returns today for follow-up.  She has now tapered off of steroids altogether and is feeling \"back to herself\".                                                                                         Again patient had been admitted 7/1 through 7/2/2022 undergoing robotic assisted total laparoscopic hysterectomy, BSO and unilateral oophorectomy.  Pathology revealed left ovarian endometrioma, cervix with endometriosis, secretory endometrium, adenomyosis, bilateral fallopian tubes with paratubal cysts, left fallopian tube with endometriosis and uterine serosa with endometriosis.  Unfortunately she did develop steroid withdrawal symptoms and needed to taper with an additional treatment plan which was put into place and which was effective.  She seen back in office 7/13/2022 feeling considerably better we plan further steroid taper.      HEMATOLOGY HISTORY:  The patient is now a 42-year-old female with a history of ITP and variable " thrombocytopenia also associated with platelet clumping and gestational thrombocytopenia. The patient was seen during her pregnancy 2012,  and again 2012 at approximately 27 weeks, platelet count 70,000-80,000. She was admitted to [facility] with a platelet count of 1000 along with red purpura and petechiae. She has a history of thrombocytopenia dating to  when she was pregnant with a platelet count in the 50,000 range. She was also treated throughout her pregnancy with Lovenox at prophylactic doses due to prior miscarriages. She tolerated this well with no bleeding complications, platelet count appar ently spontaneously improved to the 80,000 range, eventually was 95,000 at the time of .   Approximately 10 years prior to hospitalization here at [facility], she had an area of folliculitis in the left outer aspect of labia requi ring incision and drainage treated empirically with antibiotics with Bactrim and doxycycline which she received over 10 days.   On the morning of admission she noted development of diffuse erythematous rash on her abdomen and back as well as lower extremities and buccal mucosa. She presented to Dr. Lee in [place] and was found to have a white count of 5000, hemoglobin 14.2 a n d platelet count of 1000. She was transferred to [facility] for admission and treatment for presumed ITP. Admitting studies include a CRP of .6, ANTONI 1:80 positive, , IPF 31.8, initial CBC with platelet count of 2000, H and H 13.9 , 40.7, WBC 4140. She was placed on Decadron orally and IVIG. Sedimentation rate was found to be 8 and on the  IPF was still 31.7. AFO screening was also positive incidentally and CMV antibody IgG was greater than 13.2, IgM .28, EBV antibodies were a lso considerably high, EBV antibody/nuclear antigen greater than 600 and EBV antibodies ECA IgG of 450 with an EBV antibody early IgG of 17.  All of these are excessively high. The patient did fortunately respond however, by the 26th platelet count was u p to 84,000, IPF 6.7. It was elected at this point for the patient to be discharged home and followed back in the office with weekly counts and tapered steroids thereafter.   She had her counts done each week including 12/13/2010 at which time she was 201 ,000 and when seen on 12/20/2010, platelet count was 136,000. In reviewing her circumstances on that day, she went up to 30 mg of steroids and had her cut back very slowly on a weekly basis.   She returned on 01/17/2011, feeling well. Her IPF is also at normal levels today. Additional studies have been done to be certain that she does have additional hypercoagulable state as well including lupus anticoagulant including lipid antibody screening. She has had both Pneumovax and meningococcal vaccine but no Haemophilus.   The patient presented back to the office 02/14/2011 after tapering off steroids and has been off of them for 1 week. Fortunately she remained relatively stable with platelet count 116,000 and we elected to follow her on an every other we ek basis. Fortunately her subsequent counts have been stable. She has been able to complete her vaccinations as necessary. Her subsequent testing here has shown platelets in the 205,000 range on 03/28/2011, 168,000 on 04/11/2011 and 148 on 04/18/2011. She continues to feel well otherwise and has agreed that she would have monthly checks for a period of time longer for at least a 6 month period from her hospitalization.   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 She may have had a viral illness as of late. Her counts checked 05/17/2011 with a platelet count of 198,000, 06/13/2011 of 138,000. As she returns it i s clear that her platelet count is actually lower into  the near 90,000 range. We discussed this in part as possibly not necessarily related to ICP since her IPF is actually quite low and her smears do not show enlarged platelets.   As a result of the ab ove the patient was asked to have counts done closer to home and these were done at every two week intervals with a considerable variation seen. This includes on 08/03 of 146,000, 08/10 of 168,000, 08/17 of 113,000, 8/24 of 96,000 and today 106,000 here in the office. As a result of these findings in its variability thereof suggests that perhaps the patient has platelet clumping ongoing, and today we will test her for this as well.   The patient thereafter continued her usual lifestyle. [facility] for her to try to become pregnant and she did quite quickly do so. Thereafter she now sees Dr. Vic Lock, high-risk obstetrics, and her platelet count checked there approximately every other week has been dropping. She is now seen back in our office and actually has a platelet count of 56,000 with an IPF of 7. A peripheral smear is currently pending.   As result of review 01/16/2012 the patient had multifactorial reasons for her thrombocytopenia including ITP, likely additional pl atelet clumping, and? gestational thrombocytopenia. In any case, she was reasonably stable at that point and we elected to follow her counts every other week. She returns back today having done this. She was also placed on aspirin by her high-risk OB a n d she is having increasing gum bleeding likely as a result of several reasons now. Her most recent platelet counts including 84,000 on 02/06/2012 and 68,000 when seen today, 02/14/2012. IPF interestingly is 4.7. Her pregnancy has otherwise gone well wi th no additional issues thus far. She is approximately 20 weeks' gestation.   The patient was asked to be checked at her family physicians office. She also followed up with her high risk OB and has platelet count ranging  from 60,000 to as much as 80,000. Today when seen her platelet count is 79,000, IPF of 6.5. I discussed with Dr. Tomi Lock her high risk OB and plans were to try to hold off steroids unless she is below 50,000 wherein she would also discontinue aspirin. When seen today the pregn leanne has progressed nicely without any complication thus far. It is not clear if her delivery date is to be scheduled, but one expects that it might well be.   The patient's case was discussed with Dr. Tomi Lock with plans for her to again hold off steroids until she is below 50,000 at which time she will discontinue aspirin.   When seen again on 2012, pregnancy has progressed nicely and she is to see Dr. Tomi Lock in approximately 2 weeks. It is likely that she will undergo .   The patient did proceed onto steroids just prior to her pregnancy and fortunately did quite well peripartum. The patient had not been seen since that time approximately a year when she is now seen back 2013. She had presented to her PCP' s office jus t recently with abdominal pain, slowly worsening without nausea, vomiting or change in bowel habits. She underwent CT scan of the abdomen, pelvis without abnormalities noted though her blood count was somewhat suspicious with an elevated white count to 18 , 000 and platelet count down to 33,000. Normal hemoglobin and hematocrit 14.3 and 41.97. Patient rechecked two days later with platelet count of 43,000 and white count apparently normal? She now presents back to our practice for reassessment feeling much b tereza per her abdominal pain with a normal performance status, otherwise no change in bowel function.   The patient thereafter was asked to be seen back and now presents 2014. She has been noticing in the last several weeks to months, pain in multiple joints including right hip, left shoulder and the bilateral knees. She notes a general stiffness throughout multiple joints in  the a.m. as well. This is a new finding for her and she is quite troubled by it. She was undergoing assessment for it recently with additional laboratory studies done including 10/10/2013 with a platelet count found to be reduced to 86,000.   Additional laboratory results done also in [place] in June 2013 include normal serum chemistries; platelet count now is 53,000, WBC 8700, he moglobin and hematocrit 14.6, and 43.6. ANTONI titer positive, RA rheumatoid factor of 7, ASO of 137, uric acid 3.7. The patient was thereafter referred back to our practice for her thrombocytopenia. She feels well today except for again a degree of joint d iscomfort in the distribution as described above.   The patient as a result of the above and suspicions for rheumatologic disease was asked to undergo a series of studies. This included an ANTONI comprehensive panel that revealed anti-double-stranded DNA elevated at 21. Additionally, her RA panel suggested e a rly rheumatoid arthritis with a positive anti-CCP. As the patient's studies with Dr. Gonzáles however were not yet completed at the time she is seen back in our office. The patient at this point had continued steroids though when seen back in the office 01/3 1 /2014 her platelet count was 199,000. She was asked to taper prednisone down to 10 mg daily and when seen 02/07/2014 her platelet count is 89,000. The patient feels well overall today when seen. The joint discomfort has improved remarkably on the steroi d doses.   Patient seen on 02/21/2014 platelet count approximately 70,000 on 5 mg every other day per prednisone. Rheumatologic assessment ongoing mild increase in antiphospholipid antibody level.   The patient, after last being seen, was asked to drop predn isone down to 5 mg daily and then 5 mg every other day. She is now seeing Dr. Minoo Gonzáles who is assessing her as well and we sent additional laboratory studies, demonstrating mild elevation in antiphospholipid antibodies as well  as glycoprotein antibody . It was also noted that her TTP level was up to 63. Ms. Glass indicates that she is being considered for potentially lupus as an underlying diagnosis. Dr. Gonzáles would like to check her off steroids and thus we discussed tapering off further at this po int with platelet count is now reasonably acceptable.   The patient thereafter came off steroids and has been doing relatively well. As she now however, presents back it is recognized that her platelet count has been slowly dropping. This includes the 05/27 /2014 assessment with a platelet count of 30,000 with assessment on 06/11/2014 at 25,000 and today when seen on 06/13/2014 the platelet count is 20,000 with IPF 14.5. The patient states she is having increasing joint pain but has had no evidence of ecchym oses or petechial lesions and no additional gingival bleeding, epistaxis or vaginal bleeding. She in fact feels reasonably good except for the joint pain that she feels is manageable. She is, however, concerned about her platelet count and rightly so.   The patient thereafter was started on Plaquenil through Rheumatology and over the next several weeks, she had felt somewhat better. In fact when she is seen back today, 07/14/2014, she no longer has joint discomfort. She does have thrombocytopenia, however, a t approximately the same levels as she did previously. She had been on steroids in the last several weeks as they were tapered off. This was revealed when her platelet count was 23,000 three weeks ago and is recognized today. Today, however, her platelet count is 28,000. She has had no additional bleeding issues however, since last seen.   The patient, after being reviewed in July 2014, was asked to followup with plans for her to remain on Plaquenil, initiate Imuran which began in August. She stayed on th e medication, fortunately tolerating it well and as she was seen back today, 11/17/2014, overall feels as if she is having fewer  rheumatologic symptoms. Unfortunately, hematologically, she remains [id number] with her platelet count at close to 60, 000.   The patient thereafter has followed up with rheumatology on an every 2 month basis and now returned back here 4 months from previous on 03/23/2015. Her symptoms for her rheumatologic disorder remains under control though her Imuran has been moved to 150 mg a day as well as her Plaquenil, maintained at the same dose. She feels reasonably good overall today without any new symptoms, but concerned about her platelet count that was close to 40,000 when reviewed today. The patient has had no bleeding in g ums, epistaxis, GI or  tract.   The patient is seen back in followup and indicates that her rheumatologic symptoms remain under control with Imuran and Plaquenil. We find wonderfully enough that her platelet count is also improved to approximately 100,000 when seen in the office today as well.   Patient is now next reviewed June 01, 2016.  She is feeling well overall without additional rheumatologic symptoms.  She has been on Imuran for approximately 2 years and is a question of how long she'll need to stay on it.  She fortunately is hematologically stable when reviewed today platelet count of 116,000, and otherwise normal CBC     The patient is now reviewed December 50,016.  As a result of running out of her Imuran not having it refilled by a rheumatologist she has remained off Imuran.  She, fortunately, has not had any additional symptoms including rash development and/or joint discomfort.  We discussed rechecking her anticardiolipin antibodies, beta-2 glycoprotein antibodies and lupus anticoagulant at this point since she has a further degree of thrombocytopenia also present today.    The patient's subsequent laboratory studies were otherwise negative and she is now seen back May 31, 2017.  She continues to feel well without any joint disturbance but she does note bilateral malar  rash which she tends to cover with makeup.    The patient presented February 18, 2022 to [facility] for preventive examination at this point having fairly heavy periods-?  Ablation or hysterectomy.Subsequent CBC including H&H of 13.3 and 41.4 white count of 9200, MCV 82.8, MCH of 26.6 and platelet count of 68,000, normal automated differential. Subsequent pelvic ultrasound revealed homogenous appearance to endometrium per uterus, endometrial borders well-defined, no intracavitary mass, normal endometrial thickness, normal cervix, right ovary normal, left ovary of 2 cysts 1 at just over 60 mm and possible endometrioma and 1 at just over 30 mm also possible endometrioma.  Unfortunately by late April 2022 her degree of menorrhagia was worsening and she was referred to Dr. Craven who felt she should undergo a laparoscopic hysterectomy, bilateral salpingo-oophorectomy unilateral oophorectomy and possible staging possible laparotomy.  She is now referred back to try to improve her platelet count prior to any surgical procedure.     The patient is seen 5/31/2022 indicating that she has been routinely assessed by rheumatology at every 6 months while she is remained on Plaquenil undergoing laboratory studies and ophthalmologic assessments every 6 months.  She has had no issues until recently indicating her platelet count has been between 65 and 80,000.  She understands the above issues and the need to try to bring her platelet count up prior to surgical intervention by Dr. Craven.  She has had no symptoms, however, of progressive rheumatologic disease including lack of Raynaud's, further malar rash, joint discomfort, shortness of breath, cough, abdominal pain, change in bowel habit, joint swelling or neuropathy.  Additionally she has had no pelvic discomfort.    Patient responded quickly to p.o. steroids at 1 mg/kg with repeat CBC 6/7/2022 and platelet count of 273,000, IPF now reduced to 3.6, previous  level 90.1.   Patient was dropped to 40 mg daily next seen 6/14/2022 with platelet count 197,000, prednisone dropped to 30 mg daily and plans to reassess 6/20/2022.    The patient seen 6/20/2022 feeling well, platelet count at 111,000, IPF at 4.0.  Her surgery is now scheduled 7/1/2022 we plan to continue her current dosing check and her platelet count preoperatively 6/29/2022.    Patient's assessment 6/29 included a platelet count of 1 13,000 she was admitted 7/1 through 7/2/2022 undergoing robotic assisted total laparoscopic hysterectomy, BSO and unilateral oophorectomy.  Pathology revealed left ovarian endometrioma, cervix with endometriosis, secretory endometrium, adenomyosis, bilateral fallopian tubes with paratubal cysts, left fallopian tube with endometriosis and uterine serosa with endometriosis.    She is next seen back 7/13/2022.  She continues to recover postoperatively and we discussed a further steroid taper.    Patient assessed at 2-month intervals formally evaluate 11/2/2022 with essentially stable findings per her degree of thrombocytopenia.  Yearly follow-up planned.    Past Medical History:   Diagnosis Date   • Cytopenia    • Endometriosis 2006   • Gestational thrombocytopenia (HCC)    • Hx of folliculitis     IN THE LEFT OUTER ASPECT OF LABIA   • ITP (idiopathic thrombocytopenic purpura)     INCLUDING PLATELET CLUMPING   • Joint pain    • Left ovarian cyst    • Lupus (HCC)    • Seasonal allergies    • Thrombocytopenia (HCC)      ONCOLOGIC HISTORY:  (History from previous dates can be found in the separate document.)    Current Outpatient Medications on File Prior to Visit   Medication Sig Dispense Refill   • cetirizine (zyrTEC) 10 MG tablet Take  by mouth Daily.     • hydroxychloroquine (PLAQUENIL) 200 MG tablet Take 200 mg by mouth Every Night.     • multivitamin with minerals tablet tablet Take 1 tablet by mouth Daily. HOLDING FOR DOS     • predniSONE (DELTASONE) 20 MG tablet 20mg daily x 2 days, 10mg daily  "x 3 days, 10mg every other day x 4 days, then off 15 tablet 0     No current facility-administered medications on file prior to visit.     ALLERGIES:     Allergies   Allergen Reactions   • Bactrim [Sulfamethoxazole-Trimethoprim] Rash       Social History     Socioeconomic History   • Marital status:    Tobacco Use   • Smoking status: Never   • Smokeless tobacco: Never   Vaping Use   • Vaping Use: Never used   Substance and Sexual Activity   • Alcohol use: Yes     Comment: SOCIALLY   • Drug use: No         Cancer-related family history includes Testicular cancer in her brother. There is no history of Cancer.     Review of Systems  A comprehensive 14 point review of systems was performed and was negative except as mentioned.    Objective     Vitals:    11/02/22 1524   BP: 110/76   Pulse: 84   Resp: 16   Temp: 97.1 °F (36.2 °C)   TempSrc: Temporal   SpO2: 99%   Weight: 56.2 kg (123 lb 12.8 oz)   Height: 160 cm (62.99\")   PainSc: 0-No pain     Current Status 11/2/2022   ECOG score 0     Physical Exam    GENERAL: Well-developed, well-nourished female in no acute distress.   SKIN: Warm, dry without rashes, purpura or petechiae.   HEAD: Normocephalic.   EYES: Pupils equal, round and reactive to light. EOMs intact. Conjunctivae normal.   EARS: Hearing intact.   NOSE: Septum midline. No excoriations or nasal discharge.   MOUTH: Tongue is well-papillated; no stomatitis or ulcers. Lips normal.   THROAT: Oropharynx with occasional whitish plaque?  Early thrush  NECK: Supple with good range of motion; no thyromegaly or masses, no JVD or bruits.   LYMPHATICS: No cervical, supraclavicular, axillary or inguinal adenopathy.   CHEST: Lungs clear to percussion and auscultation.   CARDIAC: Regular rate and rhythm without murmurs, rubs or gallops.   ABDOMEN: Soft, nontender with no organomegaly or masses.   EXTREMITIES: No clubbing, cyanosis or edema.   NEUROLOGICAL: No focal neurological deficits.   RECENT LABS:  Hematology WBC "   Date Value Ref Range Status   11/02/2022 8.98 3.40 - 10.80 10*3/mm3 Final     RBC   Date Value Ref Range Status   11/02/2022 5.36 (H) 3.77 - 5.28 10*6/mm3 Final     Hemoglobin   Date Value Ref Range Status   11/02/2022 14.9 12.0 - 15.9 g/dL Final     Hematocrit   Date Value Ref Range Status   11/02/2022 44.8 34.0 - 46.6 % Final     Platelets   Date Value Ref Range Status   11/02/2022 83 (L) 140 - 450 10*3/mm3 Final        Assessment & Plan   *ITP  · 42-year-old female with a history of idiopathic thrombocytopenic purpura during previous pregnancy.  · In addition, she has had a degree of pseudothrombocytopenia noted on periodic blood smear and likely a component of gestational thrombocytopenia previously.   · She had more recently a relapse of ITP, improved on steroid dosing. Her symptoms suggested she be tested further for rheumatologic disorder. She was seen by Dr. Gonzáles who diagnosed systemic lupus erythematosus, which evidently includes a component of antiphospholipid antibody syndrome as well.   · The patient was placed on Plaquenil and later Imuran, to which she continues to respond.   · Her platelet count had been noted to improved somewhat when she was seen in June of this year.    · Now which is reviewed in December she continues to do well clinically though her platelet count has dropped modestly with a very minimally elevated IPF.  It was suggested rechecking her anticardiolipin antibodies, beta-2 glycoprotein antibodies and lupus anticoagulant today while she remained off Imuran.  These studies were negative.  · The patient presented February 18, 2022 to [facility] for preventive examination at this point having fairly heavy periods-?  Ablation or hysterectomy.  Subsequent CBC including H&H of 13.3 and 41.4 white count of 9200, MCV 82.8, MCH of 26.6 and platelet count of 68,000, normal automated differential.   · Unfortunately by late April 2022 her degree of menorrhagia was worsening and she was  referred to Dr. Craven who felt she should undergo a laparoscopic hysterectomy, bilateral salpingo-oophorectomy unilateral oophorectomy and possible staging possible laparotomy.  She is now referred back to try to improve her platelet count prior to any surgical procedure.  · The patient is seen 5/31/2022.  She has had no issues until recently indicating her platelet count has been between 65 and 80,000.  She was initiated on prednisone 20 mg 3 times daily (40 mg at breakfast, 20 mg in the afternoon).  · 6/7/2022, platelet count today has significantly improved, now normal at 273,000.  Discussed with Dr. Eastman, patient will be cleared from our standpoint for surgery with Dr. Craven.  Because she responded so well to prednisone, we will reduce dose to 40 mg.  She will return in 1 week for CBC and MD follow-up to potentially continue to taper prednisone.  · Patient seen 6/14/2022 with platelet count of 197,000, prednisone dropped to 30 mg daily, follow-up assessment 6/20/2022 same day as gynecologic oncology.  · Patient assessed 6/20/2022 with platelet count of 1 11,000, normal IPF, prednisone 30 mg daily continued  · Upon completion of surgery patient came off of steroids but unfortunately experience steroid withdrawal requiring a steroid taper which we put into place at 20 mg followed by 10 mg followed by every other day dosing.  · Patient seen 7/13/2022 with acceptable platelet count, prednisone reduced to 5 mg every other day for 4 days then discontinue.  Follow-up assessments at 2 and 4 months planned.  · Repeat assessments negative for worsening for thrombocytopenia, stable 11/2/2022 with yearly follow-up planned.    *Systemic lupus erythematosus  · She was seen by Dr. Gonzáles who diagnosed systemic lupus erythematosus, which evidently includes a component of antiphospholipid antibody syndrome as well.   · The patient was placed on Plaquenil and later Imuran, to which she continues to respond.   · It was  suggested rechecking her anticardiolipin antibodies, beta-2 glycoprotein antibodies and lupus anticoagulant today while she remained off Imuran.  These studies were negative.  · She continues to follow with rheumatology every 6 months while she is on Plaquenil.  She undergoes every 6-month ophthalmologic assessment and lab assessment.    *Menorrhagia  · The patient presented February 18, 2022 to [facility] for preventive examination at this point having fairly heavy periods-?  Ablation or hysterectomy.  · Subsequent pelvic ultrasound revealed homogenous appearance to endometrium per uterus, endometrial borders well-defined, no intracavitary mass, normal endometrial thickness, normal cervix, right ovary normal, left ovary of 2 cysts 1 at just over 60 mm and possible endometrioma and 1 at just over 30 mm also possible endometrioma.    · Unfortunately by late April 2022 her degree of menorrhagia was worsening and she was referred to Dr. Craven who felt she should undergo a laparoscopic hysterectomy, bilateral salpingo-oophorectomy unilateral oophorectomy and possible staging possible laparotomy.  She is now referred back to try to improve her platelet count prior to any surgical procedure.  · Ca1 25 elevated at 90.1 indicating that patient needs to have cysts removed.  Cyst themselves can elevate this marker.  We will work to improve platelet count so patient can have procedure.  · Patient will now be cleared for surgery from oncology standpoint, his platelets have returned to normal at 273,000.  · Patient subsequently admitted 7/1 through 7/2/2022 undergoing robotic assisted total laparoscopic hysterectomy, BSO and unilateral oophorectomy.  Pathology revealed left ovarian endometrioma, cervix with endometriosis, secretory endometrium, adenomyosis, bilateral fallopian tubes with paratubal cysts, left fallopian tube with endometriosis and uterine serosa with endometriosis.      Plan:     *1 year follow-up MD, CBC,  IPF        Karey Eastman MD  11/02/22

## 2023-11-01 NOTE — PROGRESS NOTES
Subjective .  Patient with steroid withdrawal symptoms, now improved with steroid taper, discussed benign findings on pathology studies postsurgery   REASONS FOR FOLLOWUP:    Idiopathic thrombocytopenic purpura.   Variable thrombocytopenia including platelet clumping. ?Gestational thrombocytopenia noted when reviewed 01/16/2012 and 02/14/2012.   Patient seen 01/16/2012 when 16 weeks pregnant.   Patient seen 05/01/2012 nearing term. Platelet count approximately 80,000.   Patient seen 01/24/2014, further thrombocytopenia, now associated with increasing joint pain-polyarticular joint garo n, rheumatologic assessment initiated, consult requested, and prednisone initiated 20 mg oral daily.   Patient status post rheumatologic assessment and results pending, platelet count responsive to oral steroids, reduced; steroid taper planned.   Patient seen on 02/21/2014, platelet count approximately 70,000 on 5 mg every other day per prednisone. Rheumatologic assessment ongoing. SLA(?). Mild increase in antiphospholipid antibody levels.   The patient seen on 06/13/2014, Plaquenil initiated for apparel sy stemic lupus erythematosus associated elevated antiphospholipid antibody, further thrombocytopenia noted in our office and to restart steroids over a 2 week period.   The patient seen 07/14/2014 - improvement per generalized rheumatologic symptoms from cyto penia persisting, Plaquenil continued. Recheck over the subsequent 2 months planned.   Patient seen 11/10/2014, platelet count approximately 50,000, pending stabilization of rheumatologic symptoms. Reassessment in 4 months planned.   Patient was seen on 03 /23/2015, platelet count between 40,000 and 50,000, again with stabilization of rheumatologic symptoms, reassessment in 6 months' time.   Patient seen 09/16/2015, platelet count of 92,000, improvement of rheumatologic symptoms, every 6 month assessments pl anned.   Patient seen June 01, 2016 stable, platelet count 1 or 16,000  "additional rheumatologic symptoms controlled?  Length of Imuran use  Patient reviewed December 5, off Imuran for approximate 6 months, anticardiolipin antibodies, lupus anticoagulant, beta-2 glycoprotein antibodies rechecked  Patient reviewed May 31, 2017 clinically stable, platelet count acceptable, yearly follow-up planned  Patient reassessed 5/31/22 with bilateral ovarian cysts, plans for surgical intervention per gynecologic oncology, reassessment per antiphospholipid antibody syndrome, steroids initiated with prednisone 1 mg/kg  Patient seen in follow-up 6/14/2022 responding to p.o. steroids, adjusted as needed.  Patient seen 6/20/2022, acceptable platelet count, surgery planned 7/1/2022  Patient status post laparoscopic hysterectomy July 2022 with benign findings  Reassessment 11/2/2022-repeat CBC at acceptable levels.  Again review of pathology with benign findings-endometriosis                The patient returns today for follow-up.  She has now tapered off of steroids altogether and is feeling \"back to herself\".                                                                                         Again patient had been admitted 7/1 through 7/2/2022 undergoing robotic assisted total laparoscopic hysterectomy, BSO and unilateral oophorectomy.  Pathology revealed left ovarian endometrioma, cervix with endometriosis, secretory endometrium, adenomyosis, bilateral fallopian tubes with paratubal cysts, left fallopian tube with endometriosis and uterine serosa with endometriosis.  Unfortunately she did develop steroid withdrawal symptoms and needed to taper with an additional treatment plan which was put into place and which was effective.  She seen back in office 7/13/2022 feeling considerably better we plan further steroid taper.      HEMATOLOGY HISTORY:  The patient is now a 42-year-old female with a history of ITP and variable thrombocytopenia also associated with platelet clumping and gestational " thrombocytopenia. The patient was seen during her pregnancy 2012,  and again 2012 at approximately 27 weeks, platelet count 70,000-80,000. She was admitted to [facility] with a platelet count of 1000 along with red purpura and petechiae. She has a history of thrombocytopenia dating to  when she was pregnant with a platelet count in the 50,000 range. She was also treated throughout her pregnancy with Lovenox at prophylactic doses due to prior miscarriages. She tolerated this well with no bleeding complications, platelet count appar ently spontaneously improved to the 80,000 range, eventually was 95,000 at the time of .   Approximately 10 years prior to hospitalization here at [facility], she had an area of folliculitis in the left outer aspect of labia requi ring incision and drainage treated empirically with antibiotics with Bactrim and doxycycline which she received over 10 days.   On the morning of admission she noted development of diffuse erythematous rash on her abdomen and back as well as lower extremities and buccal mucosa. She presented to Dr. Lee in [place] and was found to have a white count of 5000, hemoglobin 14.2 a n d platelet count of 1000. She was transferred to [facility] for admission and treatment for presumed ITP. Admitting studies include a CRP of .6, ANTONI 1:80 positive, , IPF 31.8, initial CBC with platelet count of 2000, H and H 13.9 , 40.7, WBC 4140. She was placed on Decadron orally and IVIG. Sedimentation rate was found to be 8 and on the  IPF was still 31.7. AFO screening was also positive incidentally and CMV antibody IgG was greater than 13.2, IgM .28, EBV antibodies were a lso considerably high, EBV antibody/nuclear antigen greater than 600 and EBV antibodies ECA IgG of 450 with an EBV antibody early IgG of 17. All of these are excessively high. The patient did fortunately respond  however, by the 26th platelet count was u p to 84,000, IPF 6.7. It was elected at this point for the patient to be discharged home and followed back in the office with weekly counts and tapered steroids thereafter.   She had her counts done each week including 12/13/2010 at which time she was 201 ,000 and when seen on 12/20/2010, platelet count was 136,000. In reviewing her circumstances on that day, she went up to 30 mg of steroids and had her cut back very slowly on a weekly basis.   She returned on 01/17/2011, feeling well. Her IPF is also at normal levels today. Additional studies have been done to be certain that she does have additional hypercoagulable state as well including lupus anticoagulant including lipid antibody screening. She has had both Pneumovax and meningococcal vaccine but no Haemophilus.   The patient presented back to the office 02/14/2011 after tapering off steroids and has been off of them for 1 week. Fortunately she remained relatively stable with platelet count 116,000 and we elected to follow her on an every other we ek basis. Fortunately her subsequent counts have been stable. She has been able to complete her vaccinations as necessary. Her subsequent testing here has shown platelets in the 205,000 range on 03/28/2011, 168,000 on 04/11/2011 and 148 on 04/18/2011. She continues to feel well otherwise and has agreed that she would have monthly checks for a period of time longer for at least a 6 month period from her hospitalization.   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 She may have had a viral illness as of late. Her counts checked 05/17/2011 with a platelet count of 198,000, 06/13/2011 of 138,000. As she returns it i s clear that her platelet count is actually lower into the near 90,000 range. We discussed this in part as possibly not  necessarily related to ICP since her IPF is actually quite low and her smears do not show enlarged platelets.   As a result of the ab ove the patient was asked to have counts done closer to home and these were done at every two week intervals with a considerable variation seen. This includes on 08/03 of 146,000, 08/10 of 168,000, 08/17 of 113,000, 8/24 of 96,000 and today 106,000 here in the office. As a result of these findings in its variability thereof suggests that perhaps the patient has platelet clumping ongoing, and today we will test her for this as well.   The patient thereafter continued her usual lifestyle. [facility] for her to try to become pregnant and she did quite quickly do so. Thereafter she now sees Dr. Vic Lock, high-risk obstetrics, and her platelet count checked there approximately every other week has been dropping. She is now seen back in our office and actually has a platelet count of 56,000 with an IPF of 7. A peripheral smear is currently pending.   As result of review 01/16/2012 the patient had multifactorial reasons for her thrombocytopenia including ITP, likely additional pl atelet clumping, and? gestational thrombocytopenia. In any case, she was reasonably stable at that point and we elected to follow her counts every other week. She returns back today having done this. She was also placed on aspirin by her high-risk OB a n d she is having increasing gum bleeding likely as a result of several reasons now. Her most recent platelet counts including 84,000 on 02/06/2012 and 68,000 when seen today, 02/14/2012. IPF interestingly is 4.7. Her pregnancy has otherwise gone well wi th no additional issues thus far. She is approximately 20 weeks' gestation.   The patient was asked to be checked at her family physicians office. She also followed up with her high risk OB and has platelet count ranging from 60,000 to as much as 80,000. Today when seen her platelet  count is 79,000, IPF of 6.5. I discussed with Dr. Tomi Lock her high risk OB and plans were to try to hold off steroids unless she is below 50,000 wherein she would also discontinue aspirin. When seen today the pregn leanne has progressed nicely without any complication thus far. It is not clear if her delivery date is to be scheduled, but one expects that it might well be.   The patient's case was discussed with Dr. Tomi Lock with plans for her to again hold off steroids until she is below 50,000 at which time she will discontinue aspirin.   When seen again on 2012, pregnancy has progressed nicely and she is to see Dr. Tomi Lock in approximately 2 weeks. It is likely that she will undergo .   The patient did proceed onto steroids just prior to her pregnancy and fortunately did quite well peripartum. The patient had not been seen since that time approximately a year when she is now seen back 2013. She had presented to her PCP' s office jus t recently with abdominal pain, slowly worsening without nausea, vomiting or change in bowel habits. She underwent CT scan of the abdomen, pelvis without abnormalities noted though her blood count was somewhat suspicious with an elevated white count to 18 , 000 and platelet count down to 33,000. Normal hemoglobin and hematocrit 14.3 and 41.97. Patient rechecked two days later with platelet count of 43,000 and white count apparently normal? She now presents back to our practice for reassessment feeling much b tereza per her abdominal pain with a normal performance status, otherwise no change in bowel function.   The patient thereafter was asked to be seen back and now presents 2014. She has been noticing in the last several weeks to months, pain in multiple joints including right hip, left shoulder and the bilateral knees. She notes a general stiffness throughout multiple joints in the a.m. as well. This is a new finding for her and she is  quite troubled by it. She was undergoing assessment for it recently with additional laboratory studies done including 10/10/2013 with a platelet count found to be reduced to 86,000.   Additional laboratory results done also in [place] in June 2013 include normal serum chemistries; platelet count now is 53,000, WBC 8700, he moglobin and hematocrit 14.6, and 43.6. ANTONI titer positive, RA rheumatoid factor of 7, ASO of 137, uric acid 3.7. The patient was thereafter referred back to our practice for her thrombocytopenia. She feels well today except for again a degree of joint d iscomfort in the distribution as described above.   The patient as a result of the above and suspicions for rheumatologic disease was asked to undergo a series of studies. This included an ANTONI comprehensive panel that revealed anti-double-stranded DNA elevated at 21. Additionally, her RA panel suggested e a rly rheumatoid arthritis with a positive anti-CCP. As the patient's studies with Dr. Gonzáles however were not yet completed at the time she is seen back in our office. The patient at this point had continued steroids though when seen back in the office 01/3 1 /2014 her platelet count was 199,000. She was asked to taper prednisone down to 10 mg daily and when seen 02/07/2014 her platelet count is 89,000. The patient feels well overall today when seen. The joint discomfort has improved remarkably on the steroi d doses.   Patient seen on 02/21/2014 platelet count approximately 70,000 on 5 mg every other day per prednisone. Rheumatologic assessment ongoing mild increase in antiphospholipid antibody level.   The patient, after last being seen, was asked to drop predn isone down to 5 mg daily and then 5 mg every other day. She is now seeing Dr. Minoo Gonzáles who is assessing her as well and we sent additional laboratory studies, demonstrating mild elevation in antiphospholipid antibodies as well as glycoprotein antibody . It was also noted that her TTP  level was up to 63. Ms. Glass indicates that she is being considered for potentially lupus as an underlying diagnosis. Dr. Gonzáles would like to check her off steroids and thus we discussed tapering off further at this po int with platelet count is now reasonably acceptable.   The patient thereafter came off steroids and has been doing relatively well. As she now however, presents back it is recognized that her platelet count has been slowly dropping. This includes the 05/27 /2014 assessment with a platelet count of 30,000 with assessment on 06/11/2014 at 25,000 and today when seen on 06/13/2014 the platelet count is 20,000 with IPF 14.5. The patient states she is having increasing joint pain but has had no evidence of ecchym oses or petechial lesions and no additional gingival bleeding, epistaxis or vaginal bleeding. She in fact feels reasonably good except for the joint pain that she feels is manageable. She is, however, concerned about her platelet count and rightly so.   The patient thereafter was started on Plaquenil through Rheumatology and over the next several weeks, she had felt somewhat better. In fact when she is seen back today, 07/14/2014, she no longer has joint discomfort. She does have thrombocytopenia, however, a t approximately the same levels as she did previously. She had been on steroids in the last several weeks as they were tapered off. This was revealed when her platelet count was 23,000 three weeks ago and is recognized today. Today, however, her platelet count is 28,000. She has had no additional bleeding issues however, since last seen.   The patient, after being reviewed in July 2014, was asked to followup with plans for her to remain on Plaquenil, initiate Imuran which began in August. She stayed on th e medication, fortunately tolerating it well and as she was seen back today, 11/17/2014, overall feels as if she is having fewer rheumatologic symptoms. Unfortunately, hematologically,  she remains [id number] with her platelet count at close to 60, 000.   The patient thereafter has followed up with rheumatology on an every 2 month basis and now returned back here 4 months from previous on 03/23/2015. Her symptoms for her rheumatologic disorder remains under control though her Imuran has been moved to 150 mg a day as well as her Plaquenil, maintained at the same dose. She feels reasonably good overall today without any new symptoms, but concerned about her platelet count that was close to 40,000 when reviewed today. The patient has had no bleeding in g ums, epistaxis, GI or  tract.   The patient is seen back in followup and indicates that her rheumatologic symptoms remain under control with Imuran and Plaquenil. We find wonderfully enough that her platelet count is also improved to approximately 100,000 when seen in the office today as well.   Patient is now next reviewed June 01, 2016.  She is feeling well overall without additional rheumatologic symptoms.  She has been on Imuran for approximately 2 years and is a question of how long she'll need to stay on it.  She fortunately is hematologically stable when reviewed today platelet count of 116,000, and otherwise normal CBC     The patient is now reviewed December 50,016.  As a result of running out of her Imuran not having it refilled by a rheumatologist she has remained off Imuran.  She, fortunately, has not had any additional symptoms including rash development and/or joint discomfort.  We discussed rechecking her anticardiolipin antibodies, beta-2 glycoprotein antibodies and lupus anticoagulant at this point since she has a further degree of thrombocytopenia also present today.    The patient's subsequent laboratory studies were otherwise negative and she is now seen back May 31, 2017.  She continues to feel well without any joint disturbance but she does note bilateral malar rash which she tends to cover with makeup.    The patient  presented February 18, 2022 to [facility] for preventive examination at this point having fairly heavy periods-?  Ablation or hysterectomy.Subsequent CBC including H&H of 13.3 and 41.4 white count of 9200, MCV 82.8, MCH of 26.6 and platelet count of 68,000, normal automated differential. Subsequent pelvic ultrasound revealed homogenous appearance to endometrium per uterus, endometrial borders well-defined, no intracavitary mass, normal endometrial thickness, normal cervix, right ovary normal, left ovary of 2 cysts 1 at just over 60 mm and possible endometrioma and 1 at just over 30 mm also possible endometrioma.  Unfortunately by late April 2022 her degree of menorrhagia was worsening and she was referred to Dr. Craven who felt she should undergo a laparoscopic hysterectomy, bilateral salpingo-oophorectomy unilateral oophorectomy and possible staging possible laparotomy.  She is now referred back to try to improve her platelet count prior to any surgical procedure.     The patient is seen 5/31/2022 indicating that she has been routinely assessed by rheumatology at every 6 months while she is remained on Plaquenil undergoing laboratory studies and ophthalmologic assessments every 6 months.  She has had no issues until recently indicating her platelet count has been between 65 and 80,000.  She understands the above issues and the need to try to bring her platelet count up prior to surgical intervention by Dr. Craven.  She has had no symptoms, however, of progressive rheumatologic disease including lack of Raynaud's, further malar rash, joint discomfort, shortness of breath, cough, abdominal pain, change in bowel habit, joint swelling or neuropathy.  Additionally she has had no pelvic discomfort.    Patient responded quickly to p.o. steroids at 1 mg/kg with repeat CBC 6/7/2022 and platelet count of 273,000, IPF now reduced to 3.6, previous  level 90.1.  Patient was dropped to 40 mg daily next seen 6/14/2022  with platelet count 197,000, prednisone dropped to 30 mg daily and plans to reassess 6/20/2022.    The patient seen 6/20/2022 feeling well, platelet count at 111,000, IPF at 4.0.  Her surgery is now scheduled 7/1/2022 we plan to continue her current dosing check and her platelet count preoperatively 6/29/2022.    Patient's assessment 6/29 included a platelet count of 1 13,000 she was admitted 7/1 through 7/2/2022 undergoing robotic assisted total laparoscopic hysterectomy, BSO and unilateral oophorectomy.  Pathology revealed left ovarian endometrioma, cervix with endometriosis, secretory endometrium, adenomyosis, bilateral fallopian tubes with paratubal cysts, left fallopian tube with endometriosis and uterine serosa with endometriosis.    She is next seen back 7/13/2022.  She continues to recover postoperatively and we discussed a further steroid taper.    Patient assessed at 2-month intervals formally evaluate 11/2/2022 with essentially stable findings per her degree of thrombocytopenia.  Yearly follow-up planned.    Past Medical History:   Diagnosis Date    Cytopenia     Endometriosis 2006    Gestational thrombocytopenia     Hx of folliculitis     IN THE LEFT OUTER ASPECT OF LABIA    ITP (idiopathic thrombocytopenic purpura)     INCLUDING PLATELET CLUMPING    Joint pain     Left ovarian cyst     Lupus     Seasonal allergies     Thrombocytopenia      ONCOLOGIC HISTORY:  (History from previous dates can be found in the separate document.)    Current Outpatient Medications on File Prior to Visit   Medication Sig Dispense Refill    cetirizine (zyrTEC) 10 MG tablet Take  by mouth Daily.      hydroxychloroquine (PLAQUENIL) 200 MG tablet Take 200 mg by mouth Every Night.      multivitamin with minerals tablet tablet Take 1 tablet by mouth Daily. HOLDING FOR DOS      predniSONE (DELTASONE) 20 MG tablet 20mg daily x 2 days, 10mg daily x 3 days, 10mg every other day x 4 days, then off 15 tablet 0     No current  facility-administered medications on file prior to visit.     ALLERGIES:     Allergies   Allergen Reactions    Bactrim [Sulfamethoxazole-Trimethoprim] Rash       Social History     Socioeconomic History    Marital status:    Tobacco Use    Smoking status: Never    Smokeless tobacco: Never   Vaping Use    Vaping Use: Never used   Substance and Sexual Activity    Alcohol use: Yes     Comment: SOCIALLY    Drug use: No         Cancer-related family history includes Testicular cancer in her brother. There is no history of Cancer.     Review of Systems  A comprehensive 14 point review of systems was performed and was negative except as mentioned.    Objective     There were no vitals filed for this visit.        11/2/2022     3:12 PM   Current Status   ECOG score 0     Physical Exam    GENERAL: Well-developed, well-nourished female in no acute distress.   SKIN: Warm, dry without rashes, purpura or petechiae.   HEAD: Normocephalic.   EYES: Pupils equal, round and reactive to light. EOMs intact. Conjunctivae normal.   EARS: Hearing intact.   NOSE: Septum midline. No excoriations or nasal discharge.   MOUTH: Tongue is well-papillated; no stomatitis or ulcers. Lips normal.   THROAT: Oropharynx with occasional whitish plaque?  Early thrush  NECK: Supple with good range of motion; no thyromegaly or masses, no JVD or bruits.   LYMPHATICS: No cervical, supraclavicular, axillary or inguinal adenopathy.   CHEST: Lungs clear to percussion and auscultation.   CARDIAC: Regular rate and rhythm without murmurs, rubs or gallops.   ABDOMEN: Soft, nontender with no organomegaly or masses.   EXTREMITIES: No clubbing, cyanosis or edema.   NEUROLOGICAL: No focal neurological deficits.   RECENT LABS:  Hematology WBC   Date Value Ref Range Status   11/02/2022 8.98 3.40 - 10.80 10*3/mm3 Final     RBC   Date Value Ref Range Status   11/02/2022 5.36 (H) 3.77 - 5.28 10*6/mm3 Final     Hemoglobin   Date Value Ref Range Status   11/02/2022  14.9 12.0 - 15.9 g/dL Final     Hematocrit   Date Value Ref Range Status   11/02/2022 44.8 34.0 - 46.6 % Final     Platelets   Date Value Ref Range Status   11/02/2022 83 (L) 140 - 450 10*3/mm3 Final        Assessment & Plan   *ITP  42-year-old female with a history of idiopathic thrombocytopenic purpura during previous pregnancy.  In addition, she has had a degree of pseudothrombocytopenia noted on periodic blood smear and likely a component of gestational thrombocytopenia previously.   She had more recently a relapse of ITP, improved on steroid dosing. Her symptoms suggested she be tested further for rheumatologic disorder. She was seen by Dr. Gonzáles who diagnosed systemic lupus erythematosus, which evidently includes a component of antiphospholipid antibody syndrome as well.   The patient was placed on Plaquenil and later Imuran, to which she continues to respond.   Her platelet count had been noted to improved somewhat when she was seen in June of this year.    Now which is reviewed in December she continues to do well clinically though her platelet count has dropped modestly with a very minimally elevated IPF.  It was suggested rechecking her anticardiolipin antibodies, beta-2 glycoprotein antibodies and lupus anticoagulant today while she remained off Imuran.  These studies were negative.  The patient presented February 18, 2022 to [facility] for preventive examination at this point having fairly heavy periods-?  Ablation or hysterectomy.  Subsequent CBC including H&H of 13.3 and 41.4 white count of 9200, MCV 82.8, MCH of 26.6 and platelet count of 68,000, normal automated differential.   Unfortunately by late April 2022 her degree of menorrhagia was worsening and she was referred to Dr. Craven who felt she should undergo a laparoscopic hysterectomy, bilateral salpingo-oophorectomy unilateral oophorectomy and possible staging possible laparotomy.  She is now referred back to try to improve her platelet count  prior to any surgical procedure.  The patient is seen 5/31/2022.  She has had no issues until recently indicating her platelet count has been between 65 and 80,000.  She was initiated on prednisone 20 mg 3 times daily (40 mg at breakfast, 20 mg in the afternoon).  6/7/2022, platelet count today has significantly improved, now normal at 273,000.  Discussed with Dr. Eastman, patient will be cleared from our standpoint for surgery with Dr. Craven.  Because she responded so well to prednisone, we will reduce dose to 40 mg.  She will return in 1 week for CBC and MD follow-up to potentially continue to taper prednisone.  Patient seen 6/14/2022 with platelet count of 197,000, prednisone dropped to 30 mg daily, follow-up assessment 6/20/2022 same day as gynecologic oncology.  Patient assessed 6/20/2022 with platelet count of 1 11,000, normal IPF, prednisone 30 mg daily continued  Upon completion of surgery patient came off of steroids but unfortunately experience steroid withdrawal requiring a steroid taper which we put into place at 20 mg followed by 10 mg followed by every other day dosing.  Patient seen 7/13/2022 with acceptable platelet count, prednisone reduced to 5 mg every other day for 4 days then discontinue.  Follow-up assessments at 2 and 4 months planned.  Repeat assessments negative for worsening for thrombocytopenia, stable 11/2/2022 with yearly follow-up planned.    *Systemic lupus erythematosus  She was seen by Dr. Gonzáles who diagnosed systemic lupus erythematosus, which evidently includes a component of antiphospholipid antibody syndrome as well.   The patient was placed on Plaquenil and later Imuran, to which she continues to respond.   It was suggested rechecking her anticardiolipin antibodies, beta-2 glycoprotein antibodies and lupus anticoagulant today while she remained off Imuran.  These studies were negative.  She continues to follow with rheumatology every 6 months while she is on Plaquenil.  She  undergoes every 6-month ophthalmologic assessment and lab assessment.    *Menorrhagia  The patient presented February 18, 2022 to [facility] for preventive examination at this point having fairly heavy periods-?  Ablation or hysterectomy.  Subsequent pelvic ultrasound revealed homogenous appearance to endometrium per uterus, endometrial borders well-defined, no intracavitary mass, normal endometrial thickness, normal cervix, right ovary normal, left ovary of 2 cysts 1 at just over 60 mm and possible endometrioma and 1 at just over 30 mm also possible endometrioma.    Unfortunately by late April 2022 her degree of menorrhagia was worsening and she was referred to Dr. Craven who felt she should undergo a laparoscopic hysterectomy, bilateral salpingo-oophorectomy unilateral oophorectomy and possible staging possible laparotomy.  She is now referred back to try to improve her platelet count prior to any surgical procedure.  Ca1 25 elevated at 90.1 indicating that patient needs to have cysts removed.  Cyst themselves can elevate this marker.  We will work to improve platelet count so patient can have procedure.  Patient will now be cleared for surgery from oncology standpoint, his platelets have returned to normal at 273,000.  Patient subsequently admitted 7/1 through 7/2/2022 undergoing robotic assisted total laparoscopic hysterectomy, BSO and unilateral oophorectomy.  Pathology revealed left ovarian endometrioma, cervix with endometriosis, secretory endometrium, adenomyosis, bilateral fallopian tubes with paratubal cysts, left fallopian tube with endometriosis and uterine serosa with endometriosis.      Plan:     *1 year follow-up MD, CBC, IPF        Karey Eastman MD  11/01/23

## 2023-11-02 ENCOUNTER — LAB (OUTPATIENT)
Dept: LAB | Facility: HOSPITAL | Age: 43
End: 2023-11-02
Payer: COMMERCIAL

## 2023-11-02 ENCOUNTER — OFFICE VISIT (OUTPATIENT)
Dept: ONCOLOGY | Facility: CLINIC | Age: 43
End: 2023-11-02
Payer: COMMERCIAL

## 2023-11-02 VITALS
SYSTOLIC BLOOD PRESSURE: 109 MMHG | TEMPERATURE: 98.6 F | BODY MASS INDEX: 21.63 KG/M2 | DIASTOLIC BLOOD PRESSURE: 78 MMHG | HEIGHT: 63 IN | RESPIRATION RATE: 16 BRPM | WEIGHT: 122.1 LBS | HEART RATE: 83 BPM | OXYGEN SATURATION: 99 %

## 2023-11-02 DIAGNOSIS — D68.61 ANTIPHOSPHOLIPID ANTIBODY SYNDROME: Primary | ICD-10-CM

## 2023-11-02 DIAGNOSIS — D68.61 ANTIPHOSPHOLIPID ANTIBODY SYNDROME: ICD-10-CM

## 2023-11-02 LAB
BASOPHILS # BLD AUTO: 0.04 10*3/MM3 (ref 0–0.2)
BASOPHILS NFR BLD AUTO: 0.5 % (ref 0–1.5)
DEPRECATED RDW RBC AUTO: 36.5 FL (ref 37–54)
EOSINOPHIL # BLD AUTO: 0.1 10*3/MM3 (ref 0–0.4)
EOSINOPHIL NFR BLD AUTO: 1.2 % (ref 0.3–6.2)
ERYTHROCYTE [DISTWIDTH] IN BLOOD BY AUTOMATED COUNT: 12 % (ref 12.3–15.4)
HCT VFR BLD AUTO: 43.7 % (ref 34–46.6)
HGB BLD-MCNC: 15.1 G/DL (ref 12–15.9)
IMM GRANULOCYTES # BLD AUTO: 0.1 10*3/MM3 (ref 0–0.05)
IMM GRANULOCYTES NFR BLD AUTO: 1.2 % (ref 0–0.5)
LYMPHOCYTES # BLD AUTO: 1.87 10*3/MM3 (ref 0.7–3.1)
LYMPHOCYTES NFR BLD AUTO: 23.2 % (ref 19.6–45.3)
MCH RBC QN AUTO: 28.9 PG (ref 26.6–33)
MCHC RBC AUTO-ENTMCNC: 34.6 G/DL (ref 31.5–35.7)
MCV RBC AUTO: 83.6 FL (ref 79–97)
MONOCYTES # BLD AUTO: 0.6 10*3/MM3 (ref 0.1–0.9)
MONOCYTES NFR BLD AUTO: 7.5 % (ref 5–12)
NEUTROPHILS NFR BLD AUTO: 5.34 10*3/MM3 (ref 1.7–7)
NEUTROPHILS NFR BLD AUTO: 66.4 % (ref 42.7–76)
NRBC BLD AUTO-RTO: 0 /100 WBC (ref 0–0.2)
PLATELET # BLD AUTO: 100 10*3/MM3 (ref 140–450)
PLATELETS.RETICULATED NFR BLD AUTO: 6.6 % (ref 0.9–6.5)
PMV BLD AUTO: 11.1 FL (ref 6–12)
RBC # BLD AUTO: 5.23 10*6/MM3 (ref 3.77–5.28)
WBC NRBC COR # BLD: 8.05 10*3/MM3 (ref 3.4–10.8)

## 2023-11-02 PROCEDURE — 85055 RETICULATED PLATELET ASSAY: CPT

## 2023-11-02 PROCEDURE — 85025 COMPLETE CBC W/AUTO DIFF WBC: CPT

## 2023-11-02 PROCEDURE — 99213 OFFICE O/P EST LOW 20 MIN: CPT | Performed by: INTERNAL MEDICINE

## 2023-11-02 PROCEDURE — 36415 COLL VENOUS BLD VENIPUNCTURE: CPT

## 2023-11-02 NOTE — PROGRESS NOTES
Subjective .  Patient doing very well, SLE under control   REASONS FOR FOLLOWUP:    Idiopathic thrombocytopenic purpura.   Variable thrombocytopenia including platelet clumping. ?Gestational thrombocytopenia noted when reviewed 01/16/2012 and 02/14/2012.   Patient seen 01/16/2012 when 16 weeks pregnant.   Patient seen 05/01/2012 nearing term. Platelet count approximately 80,000.   Patient seen 01/24/2014, further thrombocytopenia, now associated with increasing joint pain-polyarticular joint garo n, rheumatologic assessment initiated, consult requested, and prednisone initiated 20 mg oral daily.   Patient status post rheumatologic assessment and results pending, platelet count responsive to oral steroids, reduced; steroid taper planned.   Patient seen on 02/21/2014, platelet count approximately 70,000 on 5 mg every other day per prednisone. Rheumatologic assessment ongoing. SLA(?). Mild increase in antiphospholipid antibody levels.   The patient seen on 06/13/2014, Plaquenil initiated for apparel sy stemic lupus erythematosus associated elevated antiphospholipid antibody, further thrombocytopenia noted in our office and to restart steroids over a 2 week period.   The patient seen 07/14/2014 - improvement per generalized rheumatologic symptoms from cyto penia persisting, Plaquenil continued. Recheck over the subsequent 2 months planned.   Patient seen 11/10/2014, platelet count approximately 50,000, pending stabilization of rheumatologic symptoms. Reassessment in 4 months planned.   Patient was seen on 03 /23/2015, platelet count between 40,000 and 50,000, again with stabilization of rheumatologic symptoms, reassessment in 6 months' time.   Patient seen 09/16/2015, platelet count of 92,000, improvement of rheumatologic symptoms, every 6 month assessments pl anned.   Patient seen June 01, 2016 stable, platelet count 1 or 16,000 additional rheumatologic symptoms controlled?  Length of Imuran use  Patient reviewed  "December 5, off Imuran for approximate 6 months, anticardiolipin antibodies, lupus anticoagulant, beta-2 glycoprotein antibodies rechecked  Patient reviewed May 31, 2017 clinically stable, platelet count acceptable, yearly follow-up planned  Patient reassessed 5/31/22 with bilateral ovarian cysts, plans for surgical intervention per gynecologic oncology, reassessment per antiphospholipid antibody syndrome, steroids initiated with prednisone 1 mg/kg  Patient seen in follow-up 6/14/2022 responding to p.o. steroids, adjusted as needed.  Patient seen 6/20/2022, acceptable platelet count, surgery planned 7/1/2022  Patient status post laparoscopic hysterectomy July 2022 with benign findings  Reassessment 11/2/2022-repeat CBC at acceptable levels.  Again review of pathology with benign findings-endometriosis  Patient seen 11/2/2023 stable for SLE and ITP.                The patient returns today for follow-up.  She has now tapered off of steroids altogether and is feeling \"back to herself\".                                                                                         Again patient had been admitted 7/1 through 7/2/2022 undergoing robotic assisted total laparoscopic hysterectomy, BSO and unilateral oophorectomy.  Pathology revealed left ovarian endometrioma, cervix with endometriosis, secretory endometrium, adenomyosis, bilateral fallopian tubes with paratubal cysts, left fallopian tube with endometriosis and uterine serosa with endometriosis.  Unfortunately she did develop steroid withdrawal symptoms and needed to taper with an additional treatment plan which was put into place and which was effective.  She seen back in office 7/13/2022 feeling considerably better we plan further steroid taper.    A 1 year follow-up was requested and she is next seen 11/2/2023.  She is feeling well without any symptoms related to her SLE and her degree of thrombocytopenia is generally improved with acceptable " IPF..      HEMATOLOGY HISTORY:  The patient is now a 43-year-old female with a history of ITP and variable thrombocytopenia also associated with platelet clumping and gestational thrombocytopenia. The patient was seen during her pregnancy 2012,  and again 2012 at approximately 27 weeks, platelet count 70,000-80,000. She was admitted to [facility] with a platelet count of 1000 along with red purpura and petechiae. She has a history of thrombocytopenia dating to  when she was pregnant with a platelet count in the 50,000 range. She was also treated throughout her pregnancy with Lovenox at prophylactic doses due to prior miscarriages. She tolerated this well with no bleeding complications, platelet count appar ently spontaneously improved to the 80,000 range, eventually was 95,000 at the time of .   Approximately 10 years prior to hospitalization here at [facility], she had an area of folliculitis in the left outer aspect of labia requi ring incision and drainage treated empirically with antibiotics with Bactrim and doxycycline which she received over 10 days.   On the morning of admission she noted development of diffuse erythematous rash on her abdomen and back as well as lower extremities and buccal mucosa. She presented to Dr. Lee in [place] and was found to have a white count of 5000, hemoglobin 14.2 a n d platelet count of 1000. She was transferred to [facility] for admission and treatment for presumed ITP. Admitting studies include a CRP of .6, ANTONI 1:80 positive, , IPF 31.8, initial CBC with platelet count of 2000, H and H 13.9 , 40.7, WBC 4140. She was placed on Decadron orally and IVIG. Sedimentation rate was found to be 8 and on the  IPF was still 31.7. AFO screening was also positive incidentally and CMV antibody IgG was greater than 13.2, IgM .28, EBV antibodies were a lso considerably high, EBV  antibody/nuclear antigen greater than 600 and EBV antibodies ECA IgG of 450 with an EBV antibody early IgG of 17. All of these are excessively high. The patient did fortunately respond however, by the 26th platelet count was u p to 84,000, IPF 6.7. It was elected at this point for the patient to be discharged home and followed back in the office with weekly counts and tapered steroids thereafter.   She had her counts done each week including 12/13/2010 at which time she was 201 ,000 and when seen on 12/20/2010, platelet count was 136,000. In reviewing her circumstances on that day, she went up to 30 mg of steroids and had her cut back very slowly on a weekly basis.   She returned on 01/17/2011, feeling well. Her IPF is also at normal levels today. Additional studies have been done to be certain that she does have additional hypercoagulable state as well including lupus anticoagulant including lipid antibody screening. She has had both Pneumovax and meningococcal vaccine but no Haemophilus.   The patient presented back to the office 02/14/2011 after tapering off steroids and has been off of them for 1 week. Fortunately she remained relatively stable with platelet count 116,000 and we elected to follow her on an every other we ek basis. Fortunately her subsequent counts have been stable. She has been able to complete her vaccinations as necessary. Her subsequent testing here has shown platelets in the 205,000 range on 03/28/2011, 168,000 on 04/11/2011 and 148 on 04/18/2011. She continues to feel well otherwise and has agreed that she would have monthly checks for a period of time longer for at least a 6 month period from her hospitalization.   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 She may have had a viral illness as of late. Her counts checked 05/17/2011 with a platelet  count of 198,000, 06/13/2011 of 138,000. As she returns it i s clear that her platelet count is actually lower into the near 90,000 range. We discussed this in part as possibly not necessarily related to ICP since her IPF is actually quite low and her smears do not show enlarged platelets.   As a result of the ab ove the patient was asked to have counts done closer to home and these were done at every two week intervals with a considerable variation seen. This includes on 08/03 of 146,000, 08/10 of 168,000, 08/17 of 113,000, 8/24 of 96,000 and today 106,000 here in the office. As a result of these findings in its variability thereof suggests that perhaps the patient has platelet clumping ongoing, and today we will test her for this as well.   The patient thereafter continued her usual lifestyle. [facility] plans for her to try to become pregnant and she did quite quickly do so. Thereafter she now sees Dr. Vic Lock, high-risk obstetrics, and her platelet count checked there approximately every other week has been dropping. She is now seen back in our office and actually has a platelet count of 56,000 with an IPF of 7. A peripheral smear is currently pending.   As result of review 01/16/2012 the patient had multifactorial reasons for her thrombocytopenia including ITP, likely additional pl atelet clumping, and? gestational thrombocytopenia. In any case, she was reasonably stable at that point and we elected to follow her counts every other week. She returns back today having done this. She was also placed on aspirin by her high-risk OB a n d she is having increasing gum bleeding likely as a result of several reasons now. Her most recent platelet counts including 84,000 on 02/06/2012 and 68,000 when seen today, 02/14/2012. IPF interestingly is 4.7. Her pregnancy has otherwise gone well wi th no additional issues thus far. She is approximately 20 weeks' gestation.   The patient was asked to be  checked at her family physicians office. She also followed up with her high risk OB and has platelet count ranging from 60,000 to as much as 80,000. Today when seen her platelet count is 79,000, IPF of 6.5. I discussed with Dr. Tomi Lock her high risk OB and plans were to try to hold off steroids unless she is below 50,000 wherein she would also discontinue aspirin. When seen today the pregn leanne has progressed nicely without any complication thus far. It is not clear if her delivery date is to be scheduled, but one expects that it might well be.   The patient's case was discussed with Dr. Tomi Lock with plans for her to again hold off steroids until she is below 50,000 at which time she will discontinue aspirin.   When seen again on 2012, pregnancy has progressed nicely and she is to see Dr. Tomi Lock in approximately 2 weeks. It is likely that she will undergo .   The patient did proceed onto steroids just prior to her pregnancy and fortunately did quite well peripartum. The patient had not been seen since that time approximately a year when she is now seen back 2013. She had presented to her PCP' s office jus t recently with abdominal pain, slowly worsening without nausea, vomiting or change in bowel habits. She underwent CT scan of the abdomen, pelvis without abnormalities noted though her blood count was somewhat suspicious with an elevated white count to 18 , 000 and platelet count down to 33,000. Normal hemoglobin and hematocrit 14.3 and 41.97. Patient rechecked two days later with platelet count of 43,000 and white count apparently normal? She now presents back to our practice for reassessment feeling much b tereza per her abdominal pain with a normal performance status, otherwise no change in bowel function.   The patient thereafter was asked to be seen back and now presents 2014. She has been noticing in the last several weeks to months, pain in multiple joints  including right hip, left shoulder and the bilateral knees. She notes a general stiffness throughout multiple joints in the a.m. as well. This is a new finding for her and she is quite troubled by it. She was undergoing assessment for it recently with additional laboratory studies done including 10/10/2013 with a platelet count found to be reduced to 86,000.   Additional laboratory results done also in [place] in June 2013 include normal serum chemistries; platelet count now is 53,000, WBC 8700, he moglobin and hematocrit 14.6, and 43.6. ANTONI titer positive, RA rheumatoid factor of 7, ASO of 137, uric acid 3.7. The patient was thereafter referred back to our practice for her thrombocytopenia. She feels well today except for again a degree of joint d iscomfort in the distribution as described above.   The patient as a result of the above and suspicions for rheumatologic disease was asked to undergo a series of studies. This included an ANTONI comprehensive panel that revealed anti-double-stranded DNA elevated at 21. Additionally, her RA panel suggested e a rly rheumatoid arthritis with a positive anti-CCP. As the patient's studies with Dr. Gonzáles however were not yet completed at the time she is seen back in our office. The patient at this point had continued steroids though when seen back in the office 01/3 1 /2014 her platelet count was 199,000. She was asked to taper prednisone down to 10 mg daily and when seen 02/07/2014 her platelet count is 89,000. The patient feels well overall today when seen. The joint discomfort has improved remarkably on the steroi d doses.   Patient seen on 02/21/2014 platelet count approximately 70,000 on 5 mg every other day per prednisone. Rheumatologic assessment ongoing mild increase in antiphospholipid antibody level.   The patient, after last being seen, was asked to drop predn isone down to 5 mg daily and then 5 mg every other day. She is now seeing Dr. Minoo Gonzáles who is assessing  her as well and we sent additional laboratory studies, demonstrating mild elevation in antiphospholipid antibodies as well as glycoprotein antibody . It was also noted that her TTP level was up to 63. Ms. Glass indicates that she is being considered for potentially lupus as an underlying diagnosis. Dr. Gonzáles would like to check her off steroids and thus we discussed tapering off further at this po int with platelet count is now reasonably acceptable.   The patient thereafter came off steroids and has been doing relatively well. As she now however, presents back it is recognized that her platelet count has been slowly dropping. This includes the 05/27 /2014 assessment with a platelet count of 30,000 with assessment on 06/11/2014 at 25,000 and today when seen on 06/13/2014 the platelet count is 20,000 with IPF 14.5. The patient states she is having increasing joint pain but has had no evidence of ecchym oses or petechial lesions and no additional gingival bleeding, epistaxis or vaginal bleeding. She in fact feels reasonably good except for the joint pain that she feels is manageable. She is, however, concerned about her platelet count and rightly so.   The patient thereafter was started on Plaquenil through Rheumatology and over the next several weeks, she had felt somewhat better. In fact when she is seen back today, 07/14/2014, she no longer has joint discomfort. She does have thrombocytopenia, however, a t approximately the same levels as she did previously. She had been on steroids in the last several weeks as they were tapered off. This was revealed when her platelet count was 23,000 three weeks ago and is recognized today. Today, however, her platelet count is 28,000. She has had no additional bleeding issues however, since last seen.   The patient, after being reviewed in July 2014, was asked to followup with plans for her to remain on Plaquenil, initiate Imuran which began in August. She stayed on th e  medication, fortunately tolerating it well and as she was seen back today, 11/17/2014, overall feels as if she is having fewer rheumatologic symptoms. Unfortunately, hematologically, she remains [id number] with her platelet count at close to 60, 000.   The patient thereafter has followed up with rheumatology on an every 2 month basis and now returned back here 4 months from previous on 03/23/2015. Her symptoms for her rheumatologic disorder remains under control though her Imuran has been moved to 150 mg a day as well as her Plaquenil, maintained at the same dose. She feels reasonably good overall today without any new symptoms, but concerned about her platelet count that was close to 40,000 when reviewed today. The patient has had no bleeding in g ums, epistaxis, GI or  tract.   The patient is seen back in followup and indicates that her rheumatologic symptoms remain under control with Imuran and Plaquenil. We find wonderfully enough that her platelet count is also improved to approximately 100,000 when seen in the office today as well.   Patient is now next reviewed June 01, 2016.  She is feeling well overall without additional rheumatologic symptoms.  She has been on Imuran for approximately 2 years and is a question of how long she'll need to stay on it.  She fortunately is hematologically stable when reviewed today platelet count of 116,000, and otherwise normal CBC     The patient is now reviewed December 50,016.  As a result of running out of her Imuran not having it refilled by a rheumatologist she has remained off Imuran.  She, fortunately, has not had any additional symptoms including rash development and/or joint discomfort.  We discussed rechecking her anticardiolipin antibodies, beta-2 glycoprotein antibodies and lupus anticoagulant at this point since she has a further degree of thrombocytopenia also present today.    The patient's subsequent laboratory studies were otherwise negative and she  is now seen back May 31, 2017.  She continues to feel well without any joint disturbance but she does note bilateral malar rash which she tends to cover with makeup.    The patient presented February 18, 2022 to [facility] for preventive examination at this point having fairly heavy periods-?  Ablation or hysterectomy.Subsequent CBC including H&H of 13.3 and 41.4 white count of 9200, MCV 82.8, MCH of 26.6 and platelet count of 68,000, normal automated differential. Subsequent pelvic ultrasound revealed homogenous appearance to endometrium per uterus, endometrial borders well-defined, no intracavitary mass, normal endometrial thickness, normal cervix, right ovary normal, left ovary of 2 cysts 1 at just over 60 mm and possible endometrioma and 1 at just over 30 mm also possible endometrioma.  Unfortunately by late April 2022 her degree of menorrhagia was worsening and she was referred to Dr. Craven who felt she should undergo a laparoscopic hysterectomy, bilateral salpingo-oophorectomy unilateral oophorectomy and possible staging possible laparotomy.  She is now referred back to try to improve her platelet count prior to any surgical procedure.     The patient is seen 5/31/2022 indicating that she has been routinely assessed by rheumatology at every 6 months while she is remained on Plaquenil undergoing laboratory studies and ophthalmologic assessments every 6 months.  She has had no issues until recently indicating her platelet count has been between 65 and 80,000.  She understands the above issues and the need to try to bring her platelet count up prior to surgical intervention by Dr. Craven.  She has had no symptoms, however, of progressive rheumatologic disease including lack of Raynaud's, further malar rash, joint discomfort, shortness of breath, cough, abdominal pain, change in bowel habit, joint swelling or neuropathy.  Additionally she has had no pelvic discomfort.    Patient responded quickly to p.o.  steroids at 1 mg/kg with repeat CBC 6/7/2022 and platelet count of 273,000, IPF now reduced to 3.6, previous  level 90.1.  Patient was dropped to 40 mg daily next seen 6/14/2022 with platelet count 197,000, prednisone dropped to 30 mg daily and plans to reassess 6/20/2022.    The patient seen 6/20/2022 feeling well, platelet count at 111,000, IPF at 4.0.  Her surgery is now scheduled 7/1/2022 we plan to continue her current dosing check and her platelet count preoperatively 6/29/2022.    Patient's assessment 6/29 included a platelet count of 1 13,000 she was admitted 7/1 through 7/2/2022 undergoing robotic assisted total laparoscopic hysterectomy, BSO and unilateral oophorectomy.  Pathology revealed left ovarian endometrioma, cervix with endometriosis, secretory endometrium, adenomyosis, bilateral fallopian tubes with paratubal cysts, left fallopian tube with endometriosis and uterine serosa with endometriosis.    She is next seen back 7/13/2022.  She continues to recover postoperatively and we discussed a further steroid taper.    Patient assessed at 2-month intervals formally evaluate 11/2/2022 with essentially stable findings per her degree of thrombocytopenia.  Yearly follow-up planned.    Patient is next seen 11/2/2023 stable with acceptable platelet count and IPF level.    Past Medical History:   Diagnosis Date    Cytopenia     Endometriosis 2006    Gestational thrombocytopenia     Hx of folliculitis     IN THE LEFT OUTER ASPECT OF LABIA    ITP (idiopathic thrombocytopenic purpura)     INCLUDING PLATELET CLUMPING    Joint pain     Left ovarian cyst     Lupus     Seasonal allergies     Thrombocytopenia      ONCOLOGIC HISTORY:  (History from previous dates can be found in the separate document.)    Current Outpatient Medications on File Prior to Visit   Medication Sig Dispense Refill    cetirizine (zyrTEC) 10 MG tablet Take  by mouth Daily.      hydroxychloroquine (PLAQUENIL) 200 MG tablet Take 1 tablet by  "mouth Every Night.      multivitamin with minerals tablet tablet Take 1 tablet by mouth Daily. HOLDING FOR DOS      predniSONE (DELTASONE) 20 MG tablet 20mg daily x 2 days, 10mg daily x 3 days, 10mg every other day x 4 days, then off 15 tablet 0     No current facility-administered medications on file prior to visit.     ALLERGIES:     Allergies   Allergen Reactions    Bactrim [Sulfamethoxazole-Trimethoprim] Rash       Social History     Socioeconomic History    Marital status:    Tobacco Use    Smoking status: Never    Smokeless tobacco: Never   Vaping Use    Vaping Use: Never used   Substance and Sexual Activity    Alcohol use: Yes     Comment: SOCIALLY    Drug use: No         Cancer-related family history includes Testicular cancer in her brother. There is no history of Cancer.     Review of Systems  A comprehensive 14 point review of systems was performed and was negative except as mentioned.    Objective     Vitals:    11/02/23 1324   BP: 109/78   Pulse: 83   Resp: 16   Temp: 98.6 °F (37 °C)   TempSrc: Temporal   SpO2: 99%   Weight: 55.4 kg (122 lb 1.6 oz)   Height: 160 cm (62.99\")   PainSc: 0-No pain           11/2/2023     1:27 PM   Current Status   ECOG score 0     Physical Exam    GENERAL: Well-developed, well-nourished female in no acute distress.   SKIN: Warm, dry without rashes, purpura or petechiae.   HEAD: Normocephalic.   EYES: Pupils equal, round and reactive to light. EOMs intact. Conjunctivae normal.   EARS: Hearing intact.   NOSE: Septum midline. No excoriations or nasal discharge.   MOUTH: Tongue is well-papillated; no stomatitis or ulcers. Lips normal.   THROAT: Oropharynx with occasional whitish plaque?  Early thrush  NECK: Supple with good range of motion; no thyromegaly or masses, no JVD or bruits.   LYMPHATICS: No cervical, supraclavicular, axillary or inguinal adenopathy.   CHEST: Lungs clear to percussion and auscultation.   CARDIAC: Regular rate and rhythm without murmurs, rubs or " gallops.   ABDOMEN: Soft, nontender with no organomegaly or masses.   EXTREMITIES: No clubbing, cyanosis or edema.   NEUROLOGICAL: No focal neurological deficits.   RECENT LABS:  Hematology WBC   Date Value Ref Range Status   11/02/2023 8.05 3.40 - 10.80 10*3/mm3 Final     RBC   Date Value Ref Range Status   11/02/2023 5.23 3.77 - 5.28 10*6/mm3 Final     Hemoglobin   Date Value Ref Range Status   11/02/2023 15.1 12.0 - 15.9 g/dL Final     Hematocrit   Date Value Ref Range Status   11/02/2023 43.7 34.0 - 46.6 % Final     Platelets   Date Value Ref Range Status   11/02/2023 100 (L) 140 - 450 10*3/mm3 Final        Assessment & Plan   *ITP  43-year-old female with a history of idiopathic thrombocytopenic purpura during previous pregnancy.  In addition, she has had a degree of pseudothrombocytopenia noted on periodic blood smear and likely a component of gestational thrombocytopenia previously.   She had more recently a relapse of ITP, improved on steroid dosing. Her symptoms suggested she be tested further for rheumatologic disorder. She was seen by Dr. Gonzáles who diagnosed systemic lupus erythematosus, which evidently includes a component of antiphospholipid antibody syndrome as well.   The patient was placed on Plaquenil and later Imuran, to which she continues to respond.   Her platelet count had been noted to improved somewhat when she was seen in June of this year.    Now which is reviewed in December she continues to do well clinically though her platelet count has dropped modestly with a very minimally elevated IPF.  It was suggested rechecking her anticardiolipin antibodies, beta-2 glycoprotein antibodies and lupus anticoagulant today while she remained off Imuran.  These studies were negative.  The patient presented February 18, 2022 to [facility] for preventive examination at this point having fairly heavy periods-?  Ablation or hysterectomy.  Subsequent CBC including H&H of 13.3 and 41.4 white count of 9200,  MCV 82.8, MCH of 26.6 and platelet count of 68,000, normal automated differential.   Unfortunately by late April 2022 her degree of menorrhagia was worsening and she was referred to Dr. Craven who felt she should undergo a laparoscopic hysterectomy, bilateral salpingo-oophorectomy unilateral oophorectomy and possible staging possible laparotomy.  She is now referred back to try to improve her platelet count prior to any surgical procedure.  The patient is seen 5/31/2022.  She has had no issues until recently indicating her platelet count has been between 65 and 80,000.  She was initiated on prednisone 20 mg 3 times daily (40 mg at breakfast, 20 mg in the afternoon).  6/7/2022, platelet count today has significantly improved, now normal at 273,000.  Discussed with Dr. Eastman, patient will be cleared from our standpoint for surgery with Dr. Craven.  Because she responded so well to prednisone, we will reduce dose to 40 mg.  She will return in 1 week for CBC and MD follow-up to potentially continue to taper prednisone.  Patient seen 6/14/2022 with platelet count of 197,000, prednisone dropped to 30 mg daily, follow-up assessment 6/20/2022 same day as gynecologic oncology.  Patient assessed 6/20/2022 with platelet count of 1 11,000, normal IPF, prednisone 30 mg daily continued  Upon completion of surgery patient came off of steroids but unfortunately experience steroid withdrawal requiring a steroid taper which we put into place at 20 mg followed by 10 mg followed by every other day dosing.  Patient seen 7/13/2022 with acceptable platelet count, prednisone reduced to 5 mg every other day for 4 days then discontinue.  Follow-up assessments at 2 and 4 months planned.  Repeat assessments negative for worsening for thrombocytopenia, stable 11/2/2022 with yearly follow-up planned.  Patient stable 11/2/2023, yearly follow-up planned    *Systemic lupus erythematosus  She was seen by Dr. Gonzáles who diagnosed systemic lupus  erythematosus, which evidently includes a component of antiphospholipid antibody syndrome as well.   The patient was placed on Plaquenil and later Imuran, to which she continues to respond.   It was suggested rechecking her anticardiolipin antibodies, beta-2 glycoprotein antibodies and lupus anticoagulant today while she remained off Imuran.  These studies were negative.  She continues to follow with rheumatology every 6 months while she is on Plaquenil.  She undergoes every 6-month ophthalmologic assessment and lab assessment.    *Menorrhagia  The patient presented February 18, 2022 to [facility] for preventive examination at this point having fairly heavy periods-?  Ablation or hysterectomy.  Subsequent pelvic ultrasound revealed homogenous appearance to endometrium per uterus, endometrial borders well-defined, no intracavitary mass, normal endometrial thickness, normal cervix, right ovary normal, left ovary of 2 cysts 1 at just over 60 mm and possible endometrioma and 1 at just over 30 mm also possible endometrioma.    Unfortunately by late April 2022 her degree of menorrhagia was worsening and she was referred to Dr. Craven who felt she should undergo a laparoscopic hysterectomy, bilateral salpingo-oophorectomy unilateral oophorectomy and possible staging possible laparotomy.  She is now referred back to try to improve her platelet count prior to any surgical procedure.  Ca1 25 elevated at 90.1 indicating that patient needs to have cysts removed.  Cyst themselves can elevate this marker.  We will work to improve platelet count so patient can have procedure.  Patient will now be cleared for surgery from oncology standpoint, his platelets have returned to normal at 273,000.  Patient subsequently admitted 7/1 through 7/2/2022 undergoing robotic assisted total laparoscopic hysterectomy, BSO and unilateral oophorectomy.  Pathology revealed left ovarian endometrioma, cervix with endometriosis, secretory  endometrium, adenomyosis, bilateral fallopian tubes with paratubal cysts, left fallopian tube with endometriosis and uterine serosa with endometriosis.      Plan:     *1 year follow-up MD, CBC, IPF        Karey Eastman MD  11/02/23

## 2024-12-09 ENCOUNTER — OFFICE VISIT (OUTPATIENT)
Dept: ONCOLOGY | Facility: CLINIC | Age: 44
End: 2024-12-09
Payer: COMMERCIAL

## 2024-12-09 ENCOUNTER — LAB (OUTPATIENT)
Dept: LAB | Facility: HOSPITAL | Age: 44
End: 2024-12-09
Payer: COMMERCIAL

## 2024-12-09 VITALS
WEIGHT: 122.4 LBS | TEMPERATURE: 98.2 F | BODY MASS INDEX: 21.69 KG/M2 | DIASTOLIC BLOOD PRESSURE: 74 MMHG | RESPIRATION RATE: 16 BRPM | OXYGEN SATURATION: 99 % | SYSTOLIC BLOOD PRESSURE: 109 MMHG | HEART RATE: 90 BPM | HEIGHT: 63 IN

## 2024-12-09 DIAGNOSIS — D68.61 ANTIPHOSPHOLIPID ANTIBODY SYNDROME: ICD-10-CM

## 2024-12-09 DIAGNOSIS — D69.3 IDIOPATHIC THROMBOCYTOPENIC PURPURA: Primary | ICD-10-CM

## 2024-12-09 LAB
BASOPHILS # BLD AUTO: 0.04 10*3/MM3 (ref 0–0.2)
BASOPHILS NFR BLD AUTO: 0.5 % (ref 0–1.5)
DEPRECATED RDW RBC AUTO: 35.6 FL (ref 37–54)
EOSINOPHIL # BLD AUTO: 0.09 10*3/MM3 (ref 0–0.4)
EOSINOPHIL NFR BLD AUTO: 1.1 % (ref 0.3–6.2)
ERYTHROCYTE [DISTWIDTH] IN BLOOD BY AUTOMATED COUNT: 11.8 % (ref 12.3–15.4)
HCT VFR BLD AUTO: 40.3 % (ref 34–46.6)
HGB BLD-MCNC: 13.7 G/DL (ref 12–15.9)
IMM GRANULOCYTES # BLD AUTO: 0.08 10*3/MM3 (ref 0–0.05)
IMM GRANULOCYTES NFR BLD AUTO: 1 % (ref 0–0.5)
LYMPHOCYTES # BLD AUTO: 1.49 10*3/MM3 (ref 0.7–3.1)
LYMPHOCYTES NFR BLD AUTO: 18.3 % (ref 19.6–45.3)
MCH RBC QN AUTO: 28.4 PG (ref 26.6–33)
MCHC RBC AUTO-ENTMCNC: 34 G/DL (ref 31.5–35.7)
MCV RBC AUTO: 83.6 FL (ref 79–97)
MONOCYTES # BLD AUTO: 0.49 10*3/MM3 (ref 0.1–0.9)
MONOCYTES NFR BLD AUTO: 6 % (ref 5–12)
NEUTROPHILS NFR BLD AUTO: 5.97 10*3/MM3 (ref 1.7–7)
NEUTROPHILS NFR BLD AUTO: 73.1 % (ref 42.7–76)
NRBC BLD AUTO-RTO: 0.2 /100 WBC (ref 0–0.2)
PLATELET # BLD AUTO: 70 10*3/MM3 (ref 140–450)
PLATELETS (CITRATED) BY AUTOMATED COUNT: 68 10*3/MM3 (ref 140–450)
PLATELETS.RETICULATED NFR BLD AUTO: 8 % (ref 0.9–6.5)
PMV BLD AUTO: 10.8 FL (ref 6–12)
RBC # BLD AUTO: 4.82 10*6/MM3 (ref 3.77–5.28)
WBC NRBC COR # BLD AUTO: 8.16 10*3/MM3 (ref 3.4–10.8)

## 2024-12-09 PROCEDURE — 36415 COLL VENOUS BLD VENIPUNCTURE: CPT

## 2024-12-09 PROCEDURE — 85055 RETICULATED PLATELET ASSAY: CPT

## 2024-12-09 PROCEDURE — 85049 AUTOMATED PLATELET COUNT: CPT | Performed by: INTERNAL MEDICINE

## 2024-12-09 PROCEDURE — 99214 OFFICE O/P EST MOD 30 MIN: CPT | Performed by: INTERNAL MEDICINE

## 2024-12-09 PROCEDURE — 85025 COMPLETE CBC W/AUTO DIFF WBC: CPT

## 2024-12-09 NOTE — LETTER
December 10, 2024     Shady Rivera MD  [address] [facility] [address]    Patient: Regi Glass   YOB: 1980   Date of Visit: 12/9/2024     Dear Shady Rivera MD:       Thank you for referring Regi Glass to me for evaluation. Below are the relevant portions of my assessment and plan of care.    If you have questions, please do not hesitate to call me. I look forward to following Regi along with you.         Sincerely,        Jerad Eastman MD        CC: MD Minoo Randolph MD Kommor, Michael D., MD  12/10/24 9716  Addendum    Subjective.  Patient well without symptoms   REASONS FOR FOLLOWUP:    Idiopathic thrombocytopenic purpura.   Variable thrombocytopenia including platelet clumping. ?Gestational thrombocytopenia noted when reviewed 01/16/2012 and 02/14/2012.   Patient seen 01/16/2012 when 16 weeks pregnant.   Patient seen 05/01/2012 nearing term. Platelet count approximately 80,000.   Patient seen 01/24/2014, further thrombocytopenia, now associated with increasing joint pain-polyarticular joint garo n, rheumatologic assessment initiated, consult requested, and prednisone initiated 20 mg oral daily.   Patient status post rheumatologic assessment and results pending, platelet count responsive to oral steroids, reduced; steroid taper planned.   Patient seen on 02/21/2014, platelet count approximately 70,000 on 5 mg every other day per prednisone. Rheumatologic assessment ongoing. SLA(?). Mild increase in antiphospholipid antibody levels.   The patient seen on 06/13/2014, Plaquenil initiated for apparel sy stemic lupus erythematosus associated elevated antiphospholipid antibody, further thrombocytopenia noted in our office and to restart steroids over a 2 week period.   The patient seen 07/14/2014 - improvement per generalized rheumatologic symptoms from cyto penia persisting, Plaquenil continued. Recheck over the subsequent 2 months planned.   Patient  seen 11/10/2014, platelet count approximately 50,000, pending stabilization of rheumatologic symptoms. Reassessment in 4 months planned.   Patient was seen on 03 /23/2015, platelet count between 40,000 and 50,000, again with stabilization of rheumatologic symptoms, reassessment in 6 months' time.   Patient seen 09/16/2015, platelet count of 92,000, improvement of rheumatologic symptoms, every 6 month assessments pl anned.   Patient seen June 01, 2016 stable, platelet count 1 or 16,000 additional rheumatologic symptoms controlled?  Length of Imuran use  Patient reviewed December 5, off Imuran for approximate 6 months, anticardiolipin antibodies, lupus anticoagulant, beta-2 glycoprotein antibodies rechecked  Patient reviewed May 31, 2017 clinically stable, platelet count acceptable, yearly follow-up planned  Patient reassessed 5/31/22 with bilateral ovarian cysts, plans for surgical intervention per gynecologic oncology, reassessment per antiphospholipid antibody syndrome, steroids initiated with prednisone 1 mg/kg  Patient seen in follow-up 6/14/2022 responding to p.o. steroids, adjusted as needed.  Patient seen 6/20/2022, acceptable platelet count, surgery planned 7/1/2022  Patient status post laparoscopic hysterectomy July 2022 with benign findings  Reassessment 11/2/2022-repeat CBC at acceptable levels.  Again review of pathology with benign findings-endometriosis  Patient seen 11/2/2023 stable for SLE and ITP.  Assessment 12/9/2024 with increasing thrombocytopenia?,  Repeat citrated tube assessment as well as antibody studies-DONNA, beta-2 glycoprotein and lupus anticoagulant           The patient is seen 12/9/2024 with ongoing excellent performance status over the previous year.  We have discussed that she has no increase in her rheumatologic symptoms including assessment in June of this year continuing her current Plaquenil dosing.       Her previous history included the patient being admitted 7/1 through  7/2/2022 undergoing robotic assisted total laparoscopic hysterectomy, BSO and unilateral oophorectomy.  Pathology revealed left ovarian endometrioma, cervix with endometriosis, secretory endometrium, adenomyosis, bilateral fallopian tubes with paratubal cysts, left fallopian tube with endometriosis and uterine serosa with endometriosis.  Unfortunately she did develop steroid withdrawal symptoms and needed to taper with an additional treatment plan which was put into place and which was effective.  She seen back in office 7/13/2022 feeling considerably better we plan further steroid taper.    A 1 year follow-up was requested and she  was next seen 11/2/2023.  She is feeling well without any symptoms related to her SLE and her degree of thrombocytopenia is generally improved with acceptable IPF..    She is now seen 12/9/2024 again with an excellent performance status and no change in her symptoms.  She continues on Plaquenil and unchanged doses.  She does have evidence of increasing thrombocytopenia however with IPF only minimally elevated.  After discussion we plan to reassess her antiphospholipid antibody syndrome studies and have her seen back in short-term interval follow-up.      HEMATOLOGY HISTORY:  The patient is now a 44-year-old female with a history of ITP and variable thrombocytopenia also associated with platelet clumping and gestational thrombocytopenia. The patient was seen during her pregnancy 01/16/2012, 02/ 14/2012 and again 03/27/2012 at approximately 27 weeks, platelet count 70,000-80,000. She was admitted to [facility] with a platelet count of 1000 along with red purpura and petechiae. She has a history of thrombocytopenia dating to 06/ 2 009 when she was pregnant with a platelet count in the 50,000 range. She was also treated throughout her pregnancy with Lovenox at prophylactic doses due to prior miscarriages. She tolerated this well with no bleeding complications, platelet count  appar ently spontaneously improved to the 80,000 range, eventually was 95,000 at the time of .   Approximately 10 years prior to hospitalization here at [facility], she had an area of folliculitis in the left outer aspect of labia requi ring incision and drainage treated empirically with antibiotics with Bactrim and doxycycline which she received over 10 days.   On the morning of admission she noted development of diffuse erythematous rash on her abdomen and back as well as lower extremities and buccal mucosa. She presented to Dr. Lee in [facility] and was found to have a white count of 5000, hemoglobin 14.2 a n d platelet count of 1000. She was transferred to [facility] for admission and treatment for presumed ITP. Admitting studies include a CRP of .6, ANTONI 1:80 positive, , IPF 31.8, initial CBC with platelet count of 2000, H and H 13.9 , 40.7, WBC 4140. She was placed on Decadron orally and IVIG. Sedimentation rate was found to be 8 and on the  IPF was still 31.7. AFO screening was also positive incidentally and CMV antibody IgG was greater than 13.2, IgM .28, EBV antibodies were a lso considerably high, EBV antibody/nuclear antigen greater than 600 and EBV antibodies ECA IgG of 450 with an EBV antibody early IgG of 17. All of these are excessively high. The patient did fortunately respond however, by the  platelet count was u p to 84,000, IPF 6.7. It was elected at this point for the patient to be discharged home and followed back in the office with weekly counts and tapered steroids thereafter.   She had her counts done each week including 2010 at which time she was 201 ,000 and when seen on 2010, platelet count was 136,000. In reviewing her circumstances on that day, she went up to 30 mg of steroids and had her cut back very slowly on a weekly basis.   She returned on 2011, feeling well. Her IPF is also at normal levels today.  Additional studies have been done to be certain that she does have additional hypercoagulable state as well including lupus anticoagulant including lipid antibody screening. She has had both Pneumovax and meningococcal vaccine but no Haemophilus.   The patient presented back to the office 02/14/2011 after tapering off steroids and has been off of them for 1 week. Fortunately she remained relatively stable with platelet count 116,000 and we elected to follow her on an every other we ek basis. Fortunately her subsequent counts have been stable. She has been able to complete her vaccinations as necessary. Her subsequent testing here has shown platelets in the 205,000 range on 03/28/2011, 168,000 on 04/11/2011 and 148 on 04/18/2011. She continues to feel well otherwise and has agreed that she would have monthly checks for a period of time longer for at least a 6 month period from her hospitalization.   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 She may have had a viral illness as of late. Her counts checked 05/17/2011 with a platelet count of 198,000, 06/13/2011 of 138,000. As she returns it i s clear that her platelet count is actually lower into the near 90,000 range. We discussed this in part as possibly not necessarily related to ICP since her IPF is actually quite low and her smears do not show enlarged platelets.   As a result of the ab ove the patient was asked to have counts done closer to home and these were done at every two week intervals with a considerable variation seen. This includes on 08/03 of 146,000, 08/10 of 168,000, 08/17 of 113,000, 8/24 of 96,000 and today 106,000 here in the office. As a result of these findings in its variability thereof suggests that perhaps the patient has platelet clumping ongoing, and today we will test her for this as well.   The  patient thereafter continued her usual lifestyle. She and her RUSTba nd had made plans for her to try to become pregnant and she did quite quickly do so. Thereafter she now sees Dr. Vic Lock, high-risk obstetrics, and her platelet count checked there approximately every other week has been dropping. She is now seen back in our office and actually has a platelet count of 56,000 with an IPF of 7. A peripheral smear is currently pending.   As result of review 01/16/2012 the patient had multifactorial reasons for her thrombocytopenia including ITP, likely additional pl atelet clumping, and? gestational thrombocytopenia. In any case, she was reasonably stable at that point and we elected to follow her counts every other week. She returns back today having done this. She was also placed on aspirin by her high-risk OB a n d she is having increasing gum bleeding likely as a result of several reasons now. Her most recent platelet counts including 84,000 on 02/06/2012 and 68,000 when seen today, 02/14/2012. IPF interestingly is 4.7. Her pregnancy has otherwise gone well wi th no additional issues thus far. She is approximately 20 weeks' gestation.   The patient was asked to be checked at her family physicians office. She also followed up with her high risk OB and has platelet count ranging from 60,000 to as much as 80,000. Today when seen her platelet count is 79,000, IPF of 6.5. I discussed with Dr. Tomi Lock her high risk OB and plans were to try to hold off steroids unless she is below 50,000 wherein she would also discontinue aspirin. When seen today the pregn leanne has progressed nicely without any complication thus far. It is not clear if her delivery date is to be scheduled, but one expects that it might well be.   The patient's case was discussed with Dr. Tomi Lock with plans for her to again hold off steroids until she is below 50,000 at which time she will discontinue aspirin.   When seen again on  2012, pregnancy has progressed nicely and she is to see Dr. Tomi Lock in approximately 2 weeks. It is likely that she will undergo .   The patient did proceed onto steroids just prior to her pregnancy and fortunately did quite well peripartum. The patient had not been seen since that time approximately a year when she is now seen back 2013. She had presented to her PCP' s office jus t recently with abdominal pain, slowly worsening without nausea, vomiting or change in bowel habits. She underwent CT scan of the abdomen, pelvis without abnormalities noted though her blood count was somewhat suspicious with an elevated white count to 18 , 000 and platelet count down to 33,000. Normal hemoglobin and hematocrit 14.3 and 41.97. Patient rechecked two days later with platelet count of 43,000 and white count apparently normal? She now presents back to our practice for reassessment feeling much b tereza per her abdominal pain with a normal performance status, otherwise no change in bowel function.   The patient thereafter was asked to be seen back and now presents 2014. She has been noticing in the last several weeks to months, pain in multiple joints including right hip, left shoulder and the bilateral knees. She notes a general stiffness throughout multiple joints in the a.m. as well. This is a new finding for her and she is quite troubled by it. She was undergoing assessment for it recently with additional laboratory studies done including 10/10/2013 with a platelet count found to be reduced to 86,000.   Additional laboratory results done also in [place] in 2013 include normal serum chemistries; platelet count now is 53,000, WBC 8700, he moglobin and hematocrit 14.6, and 43.6. ANTONI titer positive, RA rheumatoid factor of 7, ASO of 137, uric acid 3.7. The patient was thereafter referred back to our practice for her thrombocytopenia. She feels well today except for again a degree of joint d  shruthiomfort in the distribution as described above.   The patient as a result of the above and suspicions for rheumatologic disease was asked to undergo a series of studies. This included an ANTONI comprehensive panel that revealed anti-double-stranded DNA elevated at 21. Additionally, her RA panel suggested e a rly rheumatoid arthritis with a positive anti-CCP. As the patient's studies with Dr. Gonzáles however were not yet completed at the time she is seen back in our office. The patient at this point had continued steroids though when seen back in the office 01/3 1 /2014 her platelet count was 199,000. She was asked to taper prednisone down to 10 mg daily and when seen 02/07/2014 her platelet count is 89,000. The patient feels well overall today when seen. The joint discomfort has improved remarkably on the steroi d doses.   Patient seen on 02/21/2014 platelet count approximately 70,000 on 5 mg every other day per prednisone. Rheumatologic assessment ongoing mild increase in antiphospholipid antibody level.   The patient, after last being seen, was asked to drop predn isone down to 5 mg daily and then 5 mg every other day. She is now seeing Dr. Minoo Gonzáles who is assessing her as well and we sent additional laboratory studies, demonstrating mild elevation in antiphospholipid antibodies as well as glycoprotein antibody . It was also noted that her TTP level was up to 63. Ms. Glass indicates that she is being considered for potentially lupus as an underlying diagnosis. Dr. Gonzáles would like to check her off steroids and thus we discussed tapering off further at this po int with platelet count is now reasonably acceptable.   The patient thereafter came off steroids and has been doing relatively well. As she now however, presents back it is recognized that her platelet count has been slowly dropping. This includes the 05/27 /2014 assessment with a platelet count of 30,000 with assessment on 06/11/2014 at 25,000 and today  when seen on 06/13/2014 the platelet count is 20,000 with IPF 14.5. The patient states she is having increasing joint pain but has had no evidence of ecchym oses or petechial lesions and no additional gingival bleeding, epistaxis or vaginal bleeding. She in fact feels reasonably good except for the joint pain that she feels is manageable. She is, however, concerned about her platelet count and rightly so.   The patient thereafter was started on Plaquenil through Rheumatology and over the next several weeks, she had felt somewhat better. In fact when she is seen back today, 07/14/2014, she no longer has joint discomfort. She does have thrombocytopenia, however, a t approximately the same levels as she did previously. She had been on steroids in the last several weeks as they were tapered off. This was revealed when her platelet count was 23,000 three weeks ago and is recognized today. Today, however, her platelet count is 28,000. She has had no additional bleeding issues however, since last seen.   The patient, after being reviewed in July 2014, was asked to followup with plans for her to remain on Plaquenil, initiate Imuran which began in August. She stayed on th e medication, fortunately tolerating it well and as she was seen back today, 11/17/2014, overall feels as if she is having fewer rheumatologic symptoms. Unfortunately, hematologically, she remains [id number] with her platelet count at close to 60, 000.   The patient thereafter has followed up with rheumatology on an every 2 month basis and now returned back here 4 months from previous on 03/23/2015. Her symptoms for her rheumatologic disorder remains under control though her Imuran has been moved to 150 mg a day as well as her Plaquenil, maintained at the same dose. She feels reasonably good overall today without any new symptoms, but concerned about her platelet count that was close to 40,000 when reviewed today. The patient has had no bleeding in  g ums, epistaxis, GI or  tract.   The patient is seen back in followup and indicates that her rheumatologic symptoms remain under control with Imuran and Plaquenil. We find wonderfully enough that her platelet count is also improved to approximately 100,000 when seen in the office today as well.   Patient is now next reviewed June 01, 2016.  She is feeling well overall without additional rheumatologic symptoms.  She has been on Imuran for approximately 2 years and is a question of how long she'll need to stay on it.  She fortunately is hematologically stable when reviewed today platelet count of 116,000, and otherwise normal CBC     The patient is now reviewed December 50,016.  As a result of running out of her Imuran not having it refilled by a rheumatologist she has remained off Imuran.  She, fortunately, has not had any additional symptoms including rash development and/or joint discomfort.  We discussed rechecking her anticardiolipin antibodies, beta-2 glycoprotein antibodies and lupus anticoagulant at this point since she has a further degree of thrombocytopenia also present today.    The patient's subsequent laboratory studies were otherwise negative and she is now seen back May 31, 2017.  She continues to feel well without any joint disturbance but she does note bilateral malar rash which she tends to cover with makeup.    The patient presented February 18, 2022 to [facility] for preventive examination at this point having fairly heavy periods-?  Ablation or hysterectomy.Subsequent CBC including H&H of 13.3 and 41.4 white count of 9200, MCV 82.8, MCH of 26.6 and platelet count of 68,000, normal automated differential. Subsequent pelvic ultrasound revealed homogenous appearance to endometrium per uterus, endometrial borders well-defined, no intracavitary mass, normal endometrial thickness, normal cervix, right ovary normal, left ovary of 2 cysts 1 at just over 60 mm and possible endometrioma and 1 at just  over 30 mm also possible endometrioma.  Unfortunately by late April 2022 her degree of menorrhagia was worsening and she was referred to Dr. Craven who felt she should undergo a laparoscopic hysterectomy, bilateral salpingo-oophorectomy unilateral oophorectomy and possible staging possible laparotomy.  She is now referred back to try to improve her platelet count prior to any surgical procedure.     The patient is seen 5/31/2022 indicating that she has been routinely assessed by rheumatology at every 6 months while she is remained on Plaquenil undergoing laboratory studies and ophthalmologic assessments every 6 months.  She has had no issues until recently indicating her platelet count has been between 65 and 80,000.  She understands the above issues and the need to try to bring her platelet count up prior to surgical intervention by Dr. Craven.  She has had no symptoms, however, of progressive rheumatologic disease including lack of Raynaud's, further malar rash, joint discomfort, shortness of breath, cough, abdominal pain, change in bowel habit, joint swelling or neuropathy.  Additionally she has had no pelvic discomfort.    Patient responded quickly to p.o. steroids at 1 mg/kg with repeat CBC 6/7/2022 and platelet count of 273,000, IPF now reduced to 3.6, previous  level 90.1.  Patient was dropped to 40 mg daily next seen 6/14/2022 with platelet count 197,000, prednisone dropped to 30 mg daily and plans to reassess 6/20/2022.    The patient seen 6/20/2022 feeling well, platelet count at 111,000, IPF at 4.0.  Her surgery is now scheduled 7/1/2022 we plan to continue her current dosing check and her platelet count preoperatively 6/29/2022.    Patient's assessment 6/29 included a platelet count of 1 13,000 she was admitted 7/1 through 7/2/2022 undergoing robotic assisted total laparoscopic hysterectomy, BSO and unilateral oophorectomy.  Pathology revealed left ovarian endometrioma, cervix with  endometriosis, secretory endometrium, adenomyosis, bilateral fallopian tubes with paratubal cysts, left fallopian tube with endometriosis and uterine serosa with endometriosis.    She is next seen back 7/13/2022.  She continues to recover postoperatively and we discussed a further steroid taper.    Patient assessed at 2-month intervals formally evaluate 11/2/2022 with essentially stable findings per her degree of thrombocytopenia.  Yearly follow-up planned.    Patient is next seen 11/2/2023 stable with acceptable platelet count and IPF level.    Patient is next seen 12/9/2024 with IPF 8.0, CBC with H&H 13.7 and 40.3, white count 8160 and platelet count of 70,000.    She is now  again with an excellent performance status and no change in her symptoms.  She continues on Plaquenil and unchanged doses.  She does have evidence of increasing thrombocytopenia however with IPF only minimally elevated.  After discussion we plan to reassess her antiphospholipid antibody syndrome studies and have her seen back in short-term interval follow-up.    Past Medical History:   Diagnosis Date   • Cytopenia    • Endometriosis 2006   • Gestational thrombocytopenia    • Hx of folliculitis     IN THE LEFT OUTER ASPECT OF LABIA   • ITP (idiopathic thrombocytopenic purpura)     INCLUDING PLATELET CLUMPING   • Joint pain    • Left ovarian cyst    • Lupus    • Seasonal allergies    • Thrombocytopenia      ONCOLOGIC HISTORY:  (History from previous dates can be found in the separate document.)    Current Outpatient Medications on File Prior to Visit   Medication Sig Dispense Refill   • cetirizine (zyrTEC) 10 MG tablet Take  by mouth Daily.     • hydroxychloroquine (PLAQUENIL) 200 MG tablet Take 1 tablet by mouth Every Night.     • multivitamin with minerals tablet tablet Take 1 tablet by mouth Daily. HOLDING FOR DOS     • predniSONE (DELTASONE) 20 MG tablet 20mg daily x 2 days, 10mg daily x 3 days, 10mg every other day x 4 days, then off 15  "tablet 0     No current facility-administered medications on file prior to visit.     ALLERGIES:     Allergies   Allergen Reactions   • Bactrim [Sulfamethoxazole-Trimethoprim] Rash       Social History     Socioeconomic History   • Marital status:    Tobacco Use   • Smoking status: Never   • Smokeless tobacco: Never   Vaping Use   • Vaping status: Never Used   Substance and Sexual Activity   • Alcohol use: Yes     Comment: SOCIALLY   • Drug use: No         Cancer-related family history includes Testicular cancer in her brother. There is no history of Cancer.     Review of Systems  A comprehensive 14 point review of systems was performed and was negative except as mentioned.    Objective    Vitals:    12/09/24 1258   BP: 109/74   Pulse: 90   Resp: 16   Temp: 98.2 °F (36.8 °C)   TempSrc: Oral   SpO2: 99%   Weight: 55.5 kg (122 lb 6.4 oz)   Height: 160 cm (63\")   PainSc: 0-No pain             12/9/2024    12:55 PM   Current Status   ECOG score 0     Physical Exam    GENERAL: Well-developed, well-nourished female in no acute distress.   SKIN: Warm, dry without rashes, purpura or petechiae.   HEAD: Normocephalic.   EYES: Pupils equal, round and reactive to light. EOMs intact. Conjunctivae normal.   EARS: Hearing intact.   NOSE: Septum midline. No excoriations or nasal discharge.   MOUTH: Tongue is well-papillated; no stomatitis or ulcers. Lips normal.   THROAT: Oropharynx with occasional whitish plaque?  Early thrush  NECK: Supple with good range of motion; no thyromegaly or masses, no JVD or bruits.   LYMPHATICS: No cervical, supraclavicular, axillary or inguinal adenopathy.   CHEST: Lungs clear to percussion and auscultation.   CARDIAC: Regular rate and rhythm without murmurs, rubs or gallops.   ABDOMEN: Soft, nontender with no organomegaly or masses.   EXTREMITIES: No clubbing, cyanosis or edema.   NEUROLOGICAL: No focal neurological deficits.   RECENT LABS:  Hematology WBC   Date Value Ref Range Status "   12/09/2024 8.16 3.40 - 10.80 10*3/mm3 Final     RBC   Date Value Ref Range Status   12/09/2024 4.82 3.77 - 5.28 10*6/mm3 Final     Hemoglobin   Date Value Ref Range Status   12/09/2024 13.7 12.0 - 15.9 g/dL Final     Hematocrit   Date Value Ref Range Status   12/09/2024 40.3 34.0 - 46.6 % Final     Platelets   Date Value Ref Range Status   12/09/2024 70 (L) 140 - 450 10*3/mm3 Final        Assessment & Plan  *ITP  44-year-old female with a history of idiopathic thrombocytopenic purpura during previous pregnancy.  In addition, she has had a degree of pseudothrombocytopenia noted on periodic blood smear and likely a component of gestational thrombocytopenia previously.   She had more recently a relapse of ITP, improved on steroid dosing. Her symptoms suggested she be tested further for rheumatologic disorder. She was seen by Dr. Gonzáles who diagnosed systemic lupus erythematosus, which evidently includes a component of antiphospholipid antibody syndrome as well.   The patient was placed on Plaquenil and later Imuran, to which she continues to respond.   Her platelet count had been noted to improved somewhat when she was seen in June of this year.    Now which is reviewed in December she continues to do well clinically though her platelet count has dropped modestly with a very minimally elevated IPF.  It was suggested rechecking her anticardiolipin antibodies, beta-2 glycoprotein antibodies and lupus anticoagulant today while she remained off Imuran.  These studies were negative.  The patient presented February 18, 2022 to [facility] for preventive examination at this point having fairly heavy periods-?  Ablation or hysterectomy.  Subsequent CBC including H&H of 13.3 and 41.4 white count of 9200, MCV 82.8, MCH of 26.6 and platelet count of 68,000, normal automated differential.   Unfortunately by late April 2022 her degree of menorrhagia was worsening and she was referred to Dr. Craven who felt she should undergo a  laparoscopic hysterectomy, bilateral salpingo-oophorectomy unilateral oophorectomy and possible staging possible laparotomy.  She is now referred back to try to improve her platelet count prior to any surgical procedure.  The patient is seen 5/31/2022.  She has had no issues until recently indicating her platelet count has been between 65 and 80,000.  She was initiated on prednisone 20 mg 3 times daily (40 mg at breakfast, 20 mg in the afternoon).  6/7/2022, platelet count today has significantly improved, now normal at 273,000.  Discussed with Dr. Eastman, patient will be cleared from our standpoint for surgery with Dr. Craven.  Because she responded so well to prednisone, we will reduce dose to 40 mg.  She will return in 1 week for CBC and MD follow-up to potentially continue to taper prednisone.  Patient seen 6/14/2022 with platelet count of 197,000, prednisone dropped to 30 mg daily, follow-up assessment 6/20/2022 same day as gynecologic oncology.  Patient assessed 6/20/2022 with platelet count of 1 11,000, normal IPF, prednisone 30 mg daily continued  Upon completion of surgery patient came off of steroids but unfortunately experience steroid withdrawal requiring a steroid taper which we put into place at 20 mg followed by 10 mg followed by every other day dosing.  Patient seen 7/13/2022 with acceptable platelet count, prednisone reduced to 5 mg every other day for 4 days then discontinue.  Follow-up assessments at 2 and 4 months planned.  Repeat assessments negative for worsening for thrombocytopenia, stable 11/2/2022 with yearly follow-up planned.  Patient stable 11/2/2023, yearly follow-up planned  Patient seen 12/9/2024, stable clinically though with further thrombocytopenia.    *Systemic lupus erythematosus  She was seen by Dr. Gonzáles who diagnosed systemic lupus erythematosus, which evidently includes a component of antiphospholipid antibody syndrome as well.   The patient was placed on Plaquenil and  later Imuran, to which she continues to respond.   It was suggested rechecking her anticardiolipin antibodies, beta-2 glycoprotein antibodies and lupus anticoagulant today while she remained off Imuran.  These studies were negative.  She continues to follow with rheumatology every 6 months while she is on Plaquenil.  She undergoes every 6-month ophthalmologic assessment and lab assessment.  She is now seen 12/9/2024 again with an excellent performance status and no change in her symptoms.  She continues on Plaquenil and unchanged doses.  She does have evidence of increasing thrombocytopenia however with IPF only minimally elevated.  After discussion we plan to reassess her antiphospholipid antibody syndrome studies and have her seen back in short-term interval follow-up.    *Menorrhagia  The patient presented February 18, 2022 to [facility] for preventive examination at this point having fairly heavy periods-?  Ablation or hysterectomy.  Subsequent pelvic ultrasound revealed homogenous appearance to endometrium per uterus, endometrial borders well-defined, no intracavitary mass, normal endometrial thickness, normal cervix, right ovary normal, left ovary of 2 cysts 1 at just over 60 mm and possible endometrioma and 1 at just over 30 mm also possible endometrioma.    Unfortunately by late April 2022 her degree of menorrhagia was worsening and she was referred to Dr. Craven who felt she should undergo a laparoscopic hysterectomy, bilateral salpingo-oophorectomy unilateral oophorectomy and possible staging possible laparotomy.  She is now referred back to try to improve her platelet count prior to any surgical procedure.  Ca1 25 elevated at 90.1 indicating that patient needs to have cysts removed.  Cyst themselves can elevate this marker.  We will work to improve platelet count so patient can have procedure.  Patient will now be cleared for surgery from oncology standpoint, his platelets have returned to normal at  273,000.  Patient subsequently admitted 7/1 through 7/2/2022 undergoing robotic assisted total laparoscopic hysterectomy, BSO and unilateral oophorectomy.  Pathology revealed left ovarian endometrioma, cervix with endometriosis, secretory endometrium, adenomyosis, bilateral fallopian tubes with paratubal cysts, left fallopian tube with endometriosis and uterine serosa with endometriosis.      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  Patient contacted and asked to start low-dose aspirin at 81 mg daily with food.  A copy of this note will be sent to Dr. Gonzáles in terms of considering increasing her Plaquenil dosing.    Karey Eastman MD  12/09/24

## 2024-12-09 NOTE — PROGRESS NOTES
Subjective .  Patient well without symptoms   REASONS FOR FOLLOWUP:    Idiopathic thrombocytopenic purpura.   Variable thrombocytopenia including platelet clumping. ?Gestational thrombocytopenia noted when reviewed 01/16/2012 and 02/14/2012.   Patient seen 01/16/2012 when 16 weeks pregnant.   Patient seen 05/01/2012 nearing term. Platelet count approximately 80,000.   Patient seen 01/24/2014, further thrombocytopenia, now associated with increasing joint pain-polyarticular joint garo n, rheumatologic assessment initiated, consult requested, and prednisone initiated 20 mg oral daily.   Patient status post rheumatologic assessment and results pending, platelet count responsive to oral steroids, reduced; steroid taper planned.   Patient seen on 02/21/2014, platelet count approximately 70,000 on 5 mg every other day per prednisone. Rheumatologic assessment ongoing. SLA(?). Mild increase in antiphospholipid antibody levels.   The patient seen on 06/13/2014, Plaquenil initiated for apparel sy stemic lupus erythematosus associated elevated antiphospholipid antibody, further thrombocytopenia noted in our office and to restart steroids over a 2 week period.   The patient seen 07/14/2014 - improvement per generalized rheumatologic symptoms from cyto penia persisting, Plaquenil continued. Recheck over the subsequent 2 months planned.   Patient seen 11/10/2014, platelet count approximately 50,000, pending stabilization of rheumatologic symptoms. Reassessment in 4 months planned.   Patient was seen on 03 /23/2015, platelet count between 40,000 and 50,000, again with stabilization of rheumatologic symptoms, reassessment in 6 months' time.   Patient seen 09/16/2015, platelet count of 92,000, improvement of rheumatologic symptoms, every 6 month assessments pl anned.   Patient seen June 01, 2016 stable, platelet count 1 or 16,000 additional rheumatologic symptoms controlled?  Length of Imuran use  Patient reviewed December 5, off  Imuran for approximate 6 months, anticardiolipin antibodies, lupus anticoagulant, beta-2 glycoprotein antibodies rechecked  Patient reviewed May 31, 2017 clinically stable, platelet count acceptable, yearly follow-up planned  Patient reassessed 5/31/22 with bilateral ovarian cysts, plans for surgical intervention per gynecologic oncology, reassessment per antiphospholipid antibody syndrome, steroids initiated with prednisone 1 mg/kg  Patient seen in follow-up 6/14/2022 responding to p.o. steroids, adjusted as needed.  Patient seen 6/20/2022, acceptable platelet count, surgery planned 7/1/2022  Patient status post laparoscopic hysterectomy July 2022 with benign findings  Reassessment 11/2/2022-repeat CBC at acceptable levels.  Again review of pathology with benign findings-endometriosis  Patient seen 11/2/2023 stable for SLE and ITP.  Assessment 12/9/2024 with increasing thrombocytopenia?,  Repeat citrated tube assessment as well as antibody studies-DONNA, beta-2 glycoprotein and lupus anticoagulant           The patient is seen 12/9/2024 with ongoing excellent performance status over the previous year.  We have discussed that she has no increase in her rheumatologic symptoms including assessment in June of this year continuing her current Plaquenil dosing.       Her previous history included the patient being admitted 7/1 through 7/2/2022 undergoing robotic assisted total laparoscopic hysterectomy, BSO and unilateral oophorectomy.  Pathology revealed left ovarian endometrioma, cervix with endometriosis, secretory endometrium, adenomyosis, bilateral fallopian tubes with paratubal cysts, left fallopian tube with endometriosis and uterine serosa with endometriosis.  Unfortunately she did develop steroid withdrawal symptoms and needed to taper with an additional treatment plan which was put into place and which was effective.  She seen back in office 7/13/2022 feeling considerably better we plan further steroid  taper.    A 1 year follow-up was requested and she  was next seen 2023.  She is feeling well without any symptoms related to her SLE and her degree of thrombocytopenia is generally improved with acceptable IPF..    She is now seen 2024 again with an excellent performance status and no change in her symptoms.  She continues on Plaquenil and unchanged doses.  She does have evidence of increasing thrombocytopenia however with IPF only minimally elevated.  After discussion we plan to reassess her antiphospholipid antibody syndrome studies and have her seen back in short-term interval follow-up.      HEMATOLOGY HISTORY:  The patient is now a 44-year-old female with a history of ITP and variable thrombocytopenia also associated with platelet clumping and gestational thrombocytopenia. The patient was seen during her pregnancy 2012,  and again 2012 at approximately 27 weeks, platelet count 70,000-80,000. She was admitted to [facility] with a platelet count of 1000 along with red purpura and petechiae. She has a history of thrombocytopenia dating to  when she was pregnant with a platelet count in the 50,000 range. She was also treated throughout her pregnancy with Lovenox at prophylactic doses due to prior miscarriages. She tolerated this well with no bleeding complications, platelet count appar ently spontaneously improved to the 80,000 range, eventually was 95,000 at the time of .   Approximately 10 years prior to hospitalization here at [facility], she had an area of folliculitis in the left outer aspect of labia requi ring incision and drainage treated empirically with antibiotics with Bactrim and doxycycline which she received over 10 days.   On the morning of admission she noted development of diffuse erythematous rash on her abdomen and back as well as lower extremities and buccal mucosa. She presented to Dr. Lee in [place] and  was found to have a white count of 5000, hemoglobin 14.2 a n d platelet count of 1000. She was transferred to [facility] for admission and treatment for presumed ITP. Admitting studies include a CRP of .6, ANTONI 1:80 positive, , IPF 31.8, initial CBC with platelet count of 2000, H and H 13.9 , 40.7, WBC 4140. She was placed on Decadron orally and IVIG. Sedimentation rate was found to be 8 and on the 24th IPF was still 31.7. AFO screening was also positive incidentally and CMV antibody IgG was greater than 13.2, IgM .28, EBV antibodies were a lso considerably high, EBV antibody/nuclear antigen greater than 600 and EBV antibodies ECA IgG of 450 with an EBV antibody early IgG of 17. All of these are excessively high. The patient did fortunately respond however, by the 26th platelet count was u p to 84,000, IPF 6.7. It was elected at this point for the patient to be discharged home and followed back in the office with weekly counts and tapered steroids thereafter.   She had her counts done each week including 12/13/2010 at which time she was 201 ,000 and when seen on 12/20/2010, platelet count was 136,000. In reviewing her circumstances on that day, she went up to 30 mg of steroids and had her cut back very slowly on a weekly basis.   She returned on 01/17/2011, feeling well. Her IPF is also at normal levels today. Additional studies have been done to be certain that she does have additional hypercoagulable state as well including lupus anticoagulant including lipid antibody screening. She has had both Pneumovax and meningococcal vaccine but no Haemophilus.   The patient presented back to the office 02/14/2011 after tapering off steroids and has been off of them for 1 week. Fortunately she remained relatively stable with platelet count 116,000 and we elected to follow her on an every other we ek basis. Fortunately her subsequent counts have been stable. She has been able to complete her  vaccinations as necessary. Her subsequent testing here has shown platelets in the 205,000 range on 03/28/2011, 168,000 on 04/11/2011 and 148 on 04/18/2011. She continues to feel well otherwise and has agreed that she would have monthly checks for a period of time longer for at least a 6 month period from her hospitalization.   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 She may have had a viral illness as of late. Her counts checked 05/17/2011 with a platelet count of 198,000, 06/13/2011 of 138,000. As she returns it i s clear that her platelet count is actually lower into the near 90,000 range. We discussed this in part as possibly not necessarily related to ICP since her IPF is actually quite low and her smears do not show enlarged platelets.   As a result of the ab ove the patient was asked to have counts done closer to home and these were done at every two week intervals with a considerable variation seen. This includes on 08/03 of 146,000, 08/10 of 168,000, 08/17 of 113,000, 8/24 of 96,000 and today 106,000 here in the office. As a result of these findings in its variability thereof suggests that perhaps the patient has platelet clumping ongoing, and today we will test her for this as well.   The patient thereafter continued her usual lifestyle. She and her [facility] nd had made plans for her to try to become pregnant and she did quite quickly do so. Thereafter she now sees Dr. Vic Lock, high-risk obstetrics, and her platelet count checked there approximately every other week has been dropping. She is now seen back in our office and actually has a platelet count of 56,000 with an IPF of 7. A peripheral smear is currently pending.   As result of review 01/16/2012 the patient had multifactorial reasons for her thrombocytopenia including ITP, likely additional pl atelet  clumping, and? gestational thrombocytopenia. In any case, she was reasonably stable at that point and we elected to follow her counts every other week. She returns back today having done this. She was also placed on aspirin by her high-risk OB a n d she is having increasing gum bleeding likely as a result of several reasons now. Her most recent platelet counts including 84,000 on 2012 and 68,000 when seen today, 2012. IPF interestingly is 4.7. Her pregnancy has otherwise gone well wi th no additional issues thus far. She is approximately 20 weeks' gestation.   The patient was asked to be checked at her family physicians office. She also followed up with her high risk OB and has platelet count ranging from 60,000 to as much as 80,000. Today when seen her platelet count is 79,000, IPF of 6.5. I discussed with Dr. Tomi Lock her high risk OB and plans were to try to hold off steroids unless she is below 50,000 wherein she would also discontinue aspirin. When seen today the pregn leanne has progressed nicely without any complication thus far. It is not clear if her delivery date is to be scheduled, but one expects that it might well be.   The patient's case was discussed with Dr. Tomi Lock with plans for her to again hold off steroids until she is below 50,000 at which time she will discontinue aspirin.   When seen again on 2012, pregnancy has progressed nicely and she is to see Dr. Tomi Lock in approximately 2 weeks. It is likely that she will undergo .   The patient did proceed onto steroids just prior to her pregnancy and fortunately did quite well peripartum. The patient had not been seen since that time approximately a year when she is now seen back 2013. She had presented to her PCP' s office jus t recently with abdominal pain, slowly worsening without nausea, vomiting or change in bowel habits. She underwent CT scan of the abdomen, pelvis without abnormalities noted  though her blood count was somewhat suspicious with an elevated white count to 18 , 000 and platelet count down to 33,000. Normal hemoglobin and hematocrit 14.3 and 41.97. Patient rechecked two days later with platelet count of 43,000 and white count apparently normal? She now presents back to our practice for reassessment feeling much b tereza per her abdominal pain with a normal performance status, otherwise no change in bowel function.   The patient thereafter was asked to be seen back and now presents 01/24/2014. She has been noticing in the last several weeks to months, pain in multiple joints including right hip, left shoulder and the bilateral knees. She notes a general stiffness throughout multiple joints in the a.m. as well. This is a new finding for her and she is quite troubled by it. She was undergoing assessment for it recently with additional laboratory studies done including 10/10/2013 with a platelet count found to be reduced to 86,000.   Additional laboratory results done also in [place] in June 2013 include normal serum chemistries; platelet count now is 53,000, WBC 8700, he moglobin and hematocrit 14.6, and 43.6. ANTONI titer positive, RA rheumatoid factor of 7, ASO of 137, uric acid 3.7. The patient was thereafter referred back to our practice for her thrombocytopenia. She feels well today except for again a degree of joint d iscomfort in the distribution as described above.   The patient as a result of the above and suspicions for rheumatologic disease was asked to undergo a series of studies. This included an ANTONI comprehensive panel that revealed anti-double-stranded DNA elevated at 21. Additionally, her RA panel suggested e a rly rheumatoid arthritis with a positive anti-CCP. As the patient's studies with Dr. Gonzáles however were not yet completed at the time she is seen back in our office. The patient at this point had continued steroids though when seen back in the office 01/3 1 /2014 her platelet  count was 199,000. She was asked to taper prednisone down to 10 mg daily and when seen 02/07/2014 her platelet count is 89,000. The patient feels well overall today when seen. The joint discomfort has improved remarkably on the steroi d doses.   Patient seen on 02/21/2014 platelet count approximately 70,000 on 5 mg every other day per prednisone. Rheumatologic assessment ongoing mild increase in antiphospholipid antibody level.   The patient, after last being seen, was asked to drop predn isone down to 5 mg daily and then 5 mg every other day. She is now seeing Dr. Minoo Gonzáles who is assessing her as well and we sent additional laboratory studies, demonstrating mild elevation in antiphospholipid antibodies as well as glycoprotein antibody . It was also noted that her TTP level was up to 63. Ms. Glass indicates that she is being considered for potentially lupus as an underlying diagnosis. Dr. Gonzáles would like to check her off steroids and thus we discussed tapering off further at this po int with platelet count is now reasonably acceptable.   The patient thereafter came off steroids and has been doing relatively well. As she now however, presents back it is recognized that her platelet count has been slowly dropping. This includes the 05/27 /2014 assessment with a platelet count of 30,000 with assessment on 06/11/2014 at 25,000 and today when seen on 06/13/2014 the platelet count is 20,000 with IPF 14.5. The patient states she is having increasing joint pain but has had no evidence of ecchym oses or petechial lesions and no additional gingival bleeding, epistaxis or vaginal bleeding. She in fact feels reasonably good except for the joint pain that she feels is manageable. She is, however, concerned about her platelet count and rightly so.   The patient thereafter was started on Plaquenil through Rheumatology and over the next several weeks, she had felt somewhat better. In fact when she is seen back today,  07/14/2014, she no longer has joint discomfort. She does have thrombocytopenia, however, a t approximately the same levels as she did previously. She had been on steroids in the last several weeks as they were tapered off. This was revealed when her platelet count was 23,000 three weeks ago and is recognized today. Today, however, her platelet count is 28,000. She has had no additional bleeding issues however, since last seen.   The patient, after being reviewed in July 2014, was asked to followup with plans for her to remain on Plaquenil, initiate Imuran which began in August. She stayed on th e medication, fortunately tolerating it well and as she was seen back today, 11/17/2014, overall feels as if she is having fewer rheumatologic symptoms. Unfortunately, hematologically, she remains [id number] with her platelet count at close to 60, 000.   The patient thereafter has followed up with rheumatology on an every 2 month basis and now returned back here 4 months from previous on 03/23/2015. Her symptoms for her rheumatologic disorder remains under control though her Imuran has been moved to 150 mg a day as well as her Plaquenil, maintained at the same dose. She feels reasonably good overall today without any new symptoms, but concerned about her platelet count that was close to 40,000 when reviewed today. The patient has had no bleeding in g ums, epistaxis, GI or  tract.   The patient is seen back in followup and indicates that her rheumatologic symptoms remain under control with Imuran and Plaquenil. We find wonderfully enough that her platelet count is also improved to approximately 100,000 when seen in the office today as well.   Patient is now next reviewed June 01, 2016.  She is feeling well overall without additional rheumatologic symptoms.  She has been on Imuran for approximately 2 years and is a question of how long she'll need to stay on it.  She fortunately is hematologically stable when reviewed  today platelet count of 116,000, and otherwise normal CBC     The patient is now reviewed December 50,016.  As a result of running out of her Imuran not having it refilled by a rheumatologist she has remained off Imuran.  She, fortunately, has not had any additional symptoms including rash development and/or joint discomfort.  We discussed rechecking her anticardiolipin antibodies, beta-2 glycoprotein antibodies and lupus anticoagulant at this point since she has a further degree of thrombocytopenia also present today.    The patient's subsequent laboratory studies were otherwise negative and she is now seen back May 31, 2017.  She continues to feel well without any joint disturbance but she does note bilateral malar rash which she tends to cover with makeup.    The patient presented February 18, 2022 to [facility] for preventive examination at this point having fairly heavy periods-?  Ablation or hysterectomy.Subsequent CBC including H&H of 13.3 and 41.4 white count of 9200, MCV 82.8, MCH of 26.6 and platelet count of 68,000, normal automated differential. Subsequent pelvic ultrasound revealed homogenous appearance to endometrium per uterus, endometrial borders well-defined, no intracavitary mass, normal endometrial thickness, normal cervix, right ovary normal, left ovary of 2 cysts 1 at just over 60 mm and possible endometrioma and 1 at just over 30 mm also possible endometrioma.  Unfortunately by late April 2022 her degree of menorrhagia was worsening and she was referred to Dr. Craven who felt she should undergo a laparoscopic hysterectomy, bilateral salpingo-oophorectomy unilateral oophorectomy and possible staging possible laparotomy.  She is now referred back to try to improve her platelet count prior to any surgical procedure.     The patient is seen 5/31/2022 indicating that she has been routinely assessed by rheumatology at every 6 months while she is remained on Plaquenil undergoing laboratory studies  and ophthalmologic assessments every 6 months.  She has had no issues until recently indicating her platelet count has been between 65 and 80,000.  She understands the above issues and the need to try to bring her platelet count up prior to surgical intervention by Dr. Craven.  She has had no symptoms, however, of progressive rheumatologic disease including lack of Raynaud's, further malar rash, joint discomfort, shortness of breath, cough, abdominal pain, change in bowel habit, joint swelling or neuropathy.  Additionally she has had no pelvic discomfort.    Patient responded quickly to p.o. steroids at 1 mg/kg with repeat CBC 6/7/2022 and platelet count of 273,000, IPF now reduced to 3.6, previous  level 90.1.  Patient was dropped to 40 mg daily next seen 6/14/2022 with platelet count 197,000, prednisone dropped to 30 mg daily and plans to reassess 6/20/2022.    The patient seen 6/20/2022 feeling well, platelet count at 111,000, IPF at 4.0.  Her surgery is now scheduled 7/1/2022 we plan to continue her current dosing check and her platelet count preoperatively 6/29/2022.    Patient's assessment 6/29 included a platelet count of 1 13,000 she was admitted 7/1 through 7/2/2022 undergoing robotic assisted total laparoscopic hysterectomy, BSO and unilateral oophorectomy.  Pathology revealed left ovarian endometrioma, cervix with endometriosis, secretory endometrium, adenomyosis, bilateral fallopian tubes with paratubal cysts, left fallopian tube with endometriosis and uterine serosa with endometriosis.    She is next seen back 7/13/2022.  She continues to recover postoperatively and we discussed a further steroid taper.    Patient assessed at 2-month intervals formally evaluate 11/2/2022 with essentially stable findings per her degree of thrombocytopenia.  Yearly follow-up planned.    Patient is next seen 11/2/2023 stable with acceptable platelet count and IPF level.    Patient is next seen 12/9/2024 with IPF  8.0, CBC with H&H 13.7 and 40.3, white count 8160 and platelet count of 70,000.    She is now  again with an excellent performance status and no change in her symptoms.  She continues on Plaquenil and unchanged doses.  She does have evidence of increasing thrombocytopenia however with IPF only minimally elevated.  After discussion we plan to reassess her antiphospholipid antibody syndrome studies and have her seen back in short-term interval follow-up.    Past Medical History:   Diagnosis Date    Cytopenia     Endometriosis 2006    Gestational thrombocytopenia     Hx of folliculitis     IN THE LEFT OUTER ASPECT OF LABIA    ITP (idiopathic thrombocytopenic purpura)     INCLUDING PLATELET CLUMPING    Joint pain     Left ovarian cyst     Lupus     Seasonal allergies     Thrombocytopenia      ONCOLOGIC HISTORY:  (History from previous dates can be found in the separate document.)    Current Outpatient Medications on File Prior to Visit   Medication Sig Dispense Refill    cetirizine (zyrTEC) 10 MG tablet Take  by mouth Daily.      hydroxychloroquine (PLAQUENIL) 200 MG tablet Take 1 tablet by mouth Every Night.      multivitamin with minerals tablet tablet Take 1 tablet by mouth Daily. HOLDING FOR DOS      predniSONE (DELTASONE) 20 MG tablet 20mg daily x 2 days, 10mg daily x 3 days, 10mg every other day x 4 days, then off 15 tablet 0     No current facility-administered medications on file prior to visit.     ALLERGIES:     Allergies   Allergen Reactions    Bactrim [Sulfamethoxazole-Trimethoprim] Rash       Social History     Socioeconomic History    Marital status:    Tobacco Use    Smoking status: Never    Smokeless tobacco: Never   Vaping Use    Vaping status: Never Used   Substance and Sexual Activity    Alcohol use: Yes     Comment: SOCIALLY    Drug use: No         Cancer-related family history includes Testicular cancer in her brother. There is no history of Cancer.     Review of Systems  A comprehensive 14  "point review of systems was performed and was negative except as mentioned.    Objective     Vitals:    12/09/24 1258   BP: 109/74   Pulse: 90   Resp: 16   Temp: 98.2 °F (36.8 °C)   TempSrc: Oral   SpO2: 99%   Weight: 55.5 kg (122 lb 6.4 oz)   Height: 160 cm (63\")   PainSc: 0-No pain             12/9/2024    12:55 PM   Current Status   ECOG score 0     Physical Exam    GENERAL: Well-developed, well-nourished female in no acute distress.   SKIN: Warm, dry without rashes, purpura or petechiae.   HEAD: Normocephalic.   EYES: Pupils equal, round and reactive to light. EOMs intact. Conjunctivae normal.   EARS: Hearing intact.   NOSE: Septum midline. No excoriations or nasal discharge.   MOUTH: Tongue is well-papillated; no stomatitis or ulcers. Lips normal.   THROAT: Oropharynx with occasional whitish plaque?  Early thrush  NECK: Supple with good range of motion; no thyromegaly or masses, no JVD or bruits.   LYMPHATICS: No cervical, supraclavicular, axillary or inguinal adenopathy.   CHEST: Lungs clear to percussion and auscultation.   CARDIAC: Regular rate and rhythm without murmurs, rubs or gallops.   ABDOMEN: Soft, nontender with no organomegaly or masses.   EXTREMITIES: No clubbing, cyanosis or edema.   NEUROLOGICAL: No focal neurological deficits.   RECENT LABS:  Hematology WBC   Date Value Ref Range Status   12/09/2024 8.16 3.40 - 10.80 10*3/mm3 Final     RBC   Date Value Ref Range Status   12/09/2024 4.82 3.77 - 5.28 10*6/mm3 Final     Hemoglobin   Date Value Ref Range Status   12/09/2024 13.7 12.0 - 15.9 g/dL Final     Hematocrit   Date Value Ref Range Status   12/09/2024 40.3 34.0 - 46.6 % Final     Platelets   Date Value Ref Range Status   12/09/2024 70 (L) 140 - 450 10*3/mm3 Final        Assessment & Plan   *ITP  44-year-old female with a history of idiopathic thrombocytopenic purpura during previous pregnancy.  In addition, she has had a degree of pseudothrombocytopenia noted on periodic blood smear and " likely a component of gestational thrombocytopenia previously.   She had more recently a relapse of ITP, improved on steroid dosing. Her symptoms suggested she be tested further for rheumatologic disorder. She was seen by Dr. Gonzáles who diagnosed systemic lupus erythematosus, which evidently includes a component of antiphospholipid antibody syndrome as well.   The patient was placed on Plaquenil and later Imuran, to which she continues to respond.   Her platelet count had been noted to improved somewhat when she was seen in June of this year.    Now which is reviewed in December she continues to do well clinically though her platelet count has dropped modestly with a very minimally elevated IPF.  It was suggested rechecking her anticardiolipin antibodies, beta-2 glycoprotein antibodies and lupus anticoagulant today while she remained off Imuran.  These studies were negative.  The patient presented February 18, 2022 to [facility] for preventive examination at this point having fairly heavy periods-?  Ablation or hysterectomy.  Subsequent CBC including H&H of 13.3 and 41.4 white count of 9200, MCV 82.8, MCH of 26.6 and platelet count of 68,000, normal automated differential.   Unfortunately by late April 2022 her degree of menorrhagia was worsening and she was referred to Dr. Craven who felt she should undergo a laparoscopic hysterectomy, bilateral salpingo-oophorectomy unilateral oophorectomy and possible staging possible laparotomy.  She is now referred back to try to improve her platelet count prior to any surgical procedure.  The patient is seen 5/31/2022.  She has had no issues until recently indicating her platelet count has been between 65 and 80,000.  She was initiated on prednisone 20 mg 3 times daily (40 mg at breakfast, 20 mg in the afternoon).  6/7/2022, platelet count today has significantly improved, now normal at 273,000.  Discussed with Dr. Eastman, patient will be cleared from our standpoint for  surgery with Dr. Craven.  Because she responded so well to prednisone, we will reduce dose to 40 mg.  She will return in 1 week for CBC and MD follow-up to potentially continue to taper prednisone.  Patient seen 6/14/2022 with platelet count of 197,000, prednisone dropped to 30 mg daily, follow-up assessment 6/20/2022 same day as gynecologic oncology.  Patient assessed 6/20/2022 with platelet count of 1 11,000, normal IPF, prednisone 30 mg daily continued  Upon completion of surgery patient came off of steroids but unfortunately experience steroid withdrawal requiring a steroid taper which we put into place at 20 mg followed by 10 mg followed by every other day dosing.  Patient seen 7/13/2022 with acceptable platelet count, prednisone reduced to 5 mg every other day for 4 days then discontinue.  Follow-up assessments at 2 and 4 months planned.  Repeat assessments negative for worsening for thrombocytopenia, stable 11/2/2022 with yearly follow-up planned.  Patient stable 11/2/2023, yearly follow-up planned  Patient seen 12/9/2024, stable clinically though with further thrombocytopenia.    *Systemic lupus erythematosus  She was seen by Dr. Gonzáles who diagnosed systemic lupus erythematosus, which evidently includes a component of antiphospholipid antibody syndrome as well.   The patient was placed on Plaquenil and later Imuran, to which she continues to respond.   It was suggested rechecking her anticardiolipin antibodies, beta-2 glycoprotein antibodies and lupus anticoagulant today while she remained off Imuran.  These studies were negative.  She continues to follow with rheumatology every 6 months while she is on Plaquenil.  She undergoes every 6-month ophthalmologic assessment and lab assessment.  She is now seen 12/9/2024 again with an excellent performance status and no change in her symptoms.  She continues on Plaquenil and unchanged doses.  She does have evidence of increasing thrombocytopenia however with IPF  only minimally elevated.  After discussion we plan to reassess her antiphospholipid antibody syndrome studies and have her seen back in short-term interval follow-up.    *Menorrhagia  The patient presented February 18, 2022 to [facility] for preventive examination at this point having fairly heavy periods-?  Ablation or hysterectomy.  Subsequent pelvic ultrasound revealed homogenous appearance to endometrium per uterus, endometrial borders well-defined, no intracavitary mass, normal endometrial thickness, normal cervix, right ovary normal, left ovary of 2 cysts 1 at just over 60 mm and possible endometrioma and 1 at just over 30 mm also possible endometrioma.    Unfortunately by late April 2022 her degree of menorrhagia was worsening and she was referred to Dr. Craven who felt she should undergo a laparoscopic hysterectomy, bilateral salpingo-oophorectomy unilateral oophorectomy and possible staging possible laparotomy.  She is now referred back to try to improve her platelet count prior to any surgical procedure.  Ca1 25 elevated at 90.1 indicating that patient needs to have cysts removed.  Cyst themselves can elevate this marker.  We will work to improve platelet count so patient can have procedure.  Patient will now be cleared for surgery from oncology standpoint, his platelets have returned to normal at 273,000.  Patient subsequently admitted 7/1 through 7/2/2022 undergoing robotic assisted total laparoscopic hysterectomy, BSO and unilateral oophorectomy.  Pathology revealed left ovarian endometrioma, cervix with endometriosis, secretory endometrium, adenomyosis, bilateral fallopian tubes with paratubal cysts, left fallopian tube with endometriosis and uterine serosa with endometriosis.      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  Patient contacted and asked to start low-dose aspirin at 81 mg daily with food.  A copy of this note will be sent to Dr. Gonzáles in terms of considering increasing her Plaquenil dosing.    Karey Eastman MD  12/09/24

## 2024-12-10 LAB
B2 GLYCOPROT1 IGA SER-ACNC: 15 GPI IGA UNITS (ref 0–25)
B2 GLYCOPROT1 IGG SER-ACNC: 83 GPI IGG UNITS (ref 0–20)
B2 GLYCOPROT1 IGM SER-ACNC: 12 GPI IGM UNITS (ref 0–32)
CARDIOLIPIN IGG SER IA-ACNC: <9 GPL U/ML (ref 0–14)
CARDIOLIPIN IGM SER IA-ACNC: 10 MPL U/ML (ref 0–12)

## 2024-12-11 LAB
LA 2 SCREEN W REFLEX-IMP: NORMAL
SCREEN APTT: 31.7 SEC (ref 0–43.5)
SCREEN DRVVT: 36.1 SEC (ref 0–47)

## 2024-12-27 NOTE — PROGRESS NOTES
REASONS FOR FOLLOWUP:    Idiopathic thrombocytopenic purpura.   Variable thrombocytopenia including platelet clumping. ?Gestational thrombocytopenia noted when reviewed 01/16/2012 and 02/14/2012.   Patient seen 01/16/2012 when 16 weeks pregnant.   Patient seen 05/01/2012 nearing term. Platelet count approximately 80,000.   Patient seen 01/24/2014, further thrombocytopenia, now associated with increasing joint pain-polyarticular joint garo n, rheumatologic assessment initiated, consult requested, and prednisone initiated 20 mg oral daily.   Patient status post rheumatologic assessment and results pending, platelet count responsive to oral steroids, reduced; steroid taper planned.   Patient seen on 02/21/2014, platelet count approximately 70,000 on 5 mg every other day per prednisone. Rheumatologic assessment ongoing. SLA(?). Mild increase in antiphospholipid antibody levels.   The patient seen on 06/13/2014, Plaquenil initiated for apparel sy stemic lupus erythematosus associated elevated antiphospholipid antibody, further thrombocytopenia noted in our office and to restart steroids over a 2 week period.   The patient seen 07/14/2014 - improvement per generalized rheumatologic symptoms from cyto penia persisting, Plaquenil continued. Recheck over the subsequent 2 months planned.   Patient seen 11/10/2014, platelet count approximately 50,000, pending stabilization of rheumatologic symptoms. Reassessment in 4 months planned.   Patient was seen on 03 /23/2015, platelet count between 40,000 and 50,000, again with stabilization of rheumatologic symptoms, reassessment in 6 months' time.   Patient seen 09/16/2015, platelet count of 92,000, improvement of rheumatologic symptoms, every 6 month assessments pl anned.   Patient seen June 01, 2016 stable, platelet count 1 or 16,000 additional rheumatologic symptoms controlled?  Length of Imuran use  Patient reviewed December 5, off Imuran for approximate 6 months,  anticardiolipin antibodies, lupus anticoagulant, beta-2 glycoprotein antibodies rechecked  Patient reviewed May 31, 2017 clinically stable, platelet count acceptable, yearly follow-up planned  Patient reassessed 5/31/22 with bilateral ovarian cysts, plans for surgical intervention per gynecologic oncology, reassessment per antiphospholipid antibody syndrome, steroids initiated with prednisone 1 mg/kg  Patient seen in follow-up 6/14/2022 responding to p.o. steroids, adjusted as needed.  Patient seen 6/20/2022, acceptable platelet count, surgery planned 7/1/2022  Patient status post laparoscopic hysterectomy July 2022 with benign findings  Reassessment 11/2/2022-repeat CBC at acceptable levels.  Again review of pathology with benign findings-endometriosis  Patient seen 11/2/2023 stable for SLE and ITP.  Assessment 12/9/2024 with increasing thrombocytopenia?,  Repeat citrated tube assessment as well as antibody studies-DONNA, beta-2 glycoprotein and lupus anticoagulant.  Subsequent discussion with rheumatology, Plaquenil moved to 300 mg daily           The patient was seen 12/9/2024 with ongoing excellent performance status over the previous year.  We have discussed that she has no increase in her rheumatologic symptoms including assessment in June of this year continuing her current Plaquenil dosing.       Her previous history included the patient being admitted 7/1 through 7/2/2022 undergoing robotic assisted total laparoscopic hysterectomy, BSO and unilateral oophorectomy.  Pathology revealed left ovarian endometrioma, cervix with endometriosis, secretory endometrium, adenomyosis, bilateral fallopian tubes with paratubal cysts, left fallopian tube with endometriosis and uterine serosa with endometriosis.  Unfortunately she did develop steroid withdrawal symptoms and needed to taper with an additional treatment plan which was put into place and which was effective.  She seen back in office 7/13/2022 feeling  considerably better we plan further steroid taper.    A 1 year follow-up was requested and she  was next seen 11/2/2023.  She is feeling well without any symptoms related to her SLE and her degree of thrombocytopenia is generally improved with acceptable IPF..    She is now seen 12/9/2024 again with an excellent performance status and no change in her symptoms.  She continues on Plaquenil and unchanged doses.  She does have evidence of increasing thrombocytopenia however with IPF only minimally elevated.  After discussion we plan to reassess her antiphospholipid antibody syndrome studies and have her seen back in short-term interval follow-up.    The patient is reviewed 12/ 30/2024.  Recent studies include additive anticardiolipin antibodies, elevated beta-2 glycoprotein antibody IgG at 83, negative lupus anticoagulant.  We have discussed that often lower platelet count findings do not need to be treated.  We want to be certain there is no additional issues such as worsening ITP which does not appear to be the case when she is tested now at 12/30/2024 with platelet count of  68,000, IPF of 7.6.  She is feeling well without any additional symptoms referable to her rheumatologic disease.  We have called her rheumatologist-Dr. Gonzáles-and discussed her dosing for Plaquenil deciding to move to 300 mg p.o. daily.        HEMATOLOGY HISTORY:  The patient is now a 44-year-old female with a history of ITP and variable thrombocytopenia also associated with platelet clumping and gestational thrombocytopenia. The patient was seen during her pregnancy 01/16/2012, 02/ 14/2012 and again 03/27/2012 at approximately 27 weeks, platelet count 70,000-80,000. She was admitted to [facility] with a platelet count of 1000 along with red purpura and petechiae. She has a history of thrombocytopenia dating to 06/ 2 009 when she was pregnant with a platelet count in the 50,000 range. She was also treated throughout her pregnancy  with Lovenox at prophylactic doses due to prior miscarriages. She tolerated this well with no bleeding complications, platelet count appar ently spontaneously improved to the 80,000 range, eventually was 95,000 at the time of .   Approximately 10 years prior to hospitalization here at [facility], she had an area of folliculitis in the left outer aspect of labia requi ring incision and drainage treated empirically with antibiotics with Bactrim and doxycycline which she received over 10 days.   On the morning of admission she noted development of diffuse erythematous rash on her abdomen and back as well as lower extremities and buccal mucosa. She presented to Dr. Lee in [place] and was found to have a white count of 5000, hemoglobin 14.2 a n d platelet count of 1000. She was transferred to [facility] for admission and treatment for presumed ITP. Admitting studies include a CRP of .6, ANTONI 1:80 positive, , IPF 31.8, initial CBC with platelet count of 2000, H and H 13.9 , 40.7, WBC 4140. She was placed on Decadron orally and IVIG. Sedimentation rate was found to be 8 and on the  IPF was still 31.7. AFO screening was also positive incidentally and CMV antibody IgG was greater than 13.2, IgM .28, EBV antibodies were a lso considerably high, EBV antibody/nuclear antigen greater than 600 and EBV antibodies ECA IgG of 450 with an EBV antibody early IgG of 17. All of these are excessively high. The patient did fortunately respond however, by the  platelet count was u p to 84,000, IPF 6.7. It was elected at this point for the patient to be discharged home and followed back in the office with weekly counts and tapered steroids thereafter.   She had her counts done each week including 2010 at which time she was 201 ,000 and when seen on 2010, platelet count was 136,000. In reviewing her circumstances on that day, she went up to 30 mg of steroids and had  her cut back very slowly on a weekly basis.   She returned on 01/17/2011, feeling well. Her IPF is also at normal levels today. Additional studies have been done to be certain that she does have additional hypercoagulable state as well including lupus anticoagulant including lipid antibody screening. She has had both Pneumovax and meningococcal vaccine but no Haemophilus.   The patient presented back to the office 02/14/2011 after tapering off steroids and has been off of them for 1 week. Fortunately she remained relatively stable with platelet count 116,000 and we elected to follow her on an every other we ek basis. Fortunately her subsequent counts have been stable. She has been able to complete her vaccinations as necessary. Her subsequent testing here has shown platelets in the 205,000 range on 03/28/2011, 168,000 on 04/11/2011 and 148 on 04/18/2011. She continues to feel well otherwise and has agreed that she would have monthly checks for a period of time longer for at least a 6 month period from her hospitalization.   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 She may have had a viral illness as of late. Her counts checked 05/17/2011 with a platelet count of 198,000, 06/13/2011 of 138,000. As she returns it i s clear that her platelet count is actually lower into the near 90,000 range. We discussed this in part as possibly not necessarily related to ICP since her IPF is actually quite low and her smears do not show enlarged platelets.   As a result of the ab ove the patient was asked to have counts done closer to home and these were done at every two week intervals with a considerable variation seen. This includes on 08/03 of 146,000, 08/10 of 168,000, 08/17 of 113,000, 8/24 of 96,000 and today 106,000 here in the office. As a result of these findings in its  variability thereof suggests that perhaps the patient has platelet clumping ongoing, and today we will test her for this as well.   The patient thereafter continued her usual lifestyle. [facility] for her to try to become pregnant and she did quite quickly do so. Thereafter she now sees Dr. Vic Lock, high-risk obstetrics, and her platelet count checked there approximately every other week has been dropping. She is now seen back in our office and actually has a platelet count of 56,000 with an IPF of 7. A peripheral smear is currently pending.   As result of review 01/16/2012 the patient had multifactorial reasons for her thrombocytopenia including ITP, likely additional pl atelet clumping, and? gestational thrombocytopenia. In any case, she was reasonably stable at that point and we elected to follow her counts every other week. She returns back today having done this. She was also placed on aspirin by her high-risk OB a n d she is having increasing gum bleeding likely as a result of several reasons now. Her most recent platelet counts including 84,000 on 02/06/2012 and 68,000 when seen today, 02/14/2012. IPF interestingly is 4.7. Her pregnancy has otherwise gone well wi th no additional issues thus far. She is approximately 20 weeks' gestation.   The patient was asked to be checked at her family physicians office. She also followed up with her high risk OB and has platelet count ranging from 60,000 to as much as 80,000. Today when seen her platelet count is 79,000, IPF of 6.5. I discussed with Dr. Tomi Lock her high risk OB and plans were to try to hold off steroids unless she is below 50,000 wherein she would also discontinue aspirin. When seen today the pregn leanne has progressed nicely without any complication thus far. It is not clear if her delivery date is to be scheduled, but one expects that it might well be.   The patient's case was discussed with Dr. Tomi Lock with  plans for her to again hold off steroids until she is below 50,000 at which time she will discontinue aspirin.   When seen again on 2012, pregnancy has progressed nicely and she is to see Dr. Tomi Lock in approximately 2 weeks. It is likely that she will undergo .   The patient did proceed onto steroids just prior to her pregnancy and fortunately did quite well peripartum. The patient had not been seen since that time approximately a year when she is now seen back 2013. She had presented to her PCP' s office jus t recently with abdominal pain, slowly worsening without nausea, vomiting or change in bowel habits. She underwent CT scan of the abdomen, pelvis without abnormalities noted though her blood count was somewhat suspicious with an elevated white count to 18 , 000 and platelet count down to 33,000. Normal hemoglobin and hematocrit 14.3 and 41.97. Patient rechecked two days later with platelet count of 43,000 and white count apparently normal? She now presents back to our practice for reassessment feeling much b tereza per her abdominal pain with a normal performance status, otherwise no change in bowel function.   The patient thereafter was asked to be seen back and now presents 2014. She has been noticing in the last several weeks to months, pain in multiple joints including right hip, left shoulder and the bilateral knees. She notes a general stiffness throughout multiple joints in the a.m. as well. This is a new finding for her and she is quite troubled by it. She was undergoing assessment for it recently with additional laboratory studies done including 10/10/2013 with a platelet count found to be reduced to 86,000.   Additional laboratory results done also in [place] in 2013 include normal serum chemistries; platelet count now is 53,000, WBC 8700, he moglobin and hematocrit 14.6, and 43.6. ANTONI titer positive, RA rheumatoid factor of 7, ASO of 137, uric acid 3.7. The  patient was thereafter referred back to our practice for her thrombocytopenia. She feels well today except for again a degree of joint d iscomfort in the distribution as described above.   The patient as a result of the above and suspicions for rheumatologic disease was asked to undergo a series of studies. This included an ANTONI comprehensive panel that revealed anti-double-stranded DNA elevated at 21. Additionally, her RA panel suggested e a rly rheumatoid arthritis with a positive anti-CCP. As the patient's studies with Dr. Gonzáles however were not yet completed at the time she is seen back in our office. The patient at this point had continued steroids though when seen back in the office 01/3 1 /2014 her platelet count was 199,000. She was asked to taper prednisone down to 10 mg daily and when seen 02/07/2014 her platelet count is 89,000. The patient feels well overall today when seen. The joint discomfort has improved remarkably on the steroi d doses.   Patient seen on 02/21/2014 platelet count approximately 70,000 on 5 mg every other day per prednisone. Rheumatologic assessment ongoing mild increase in antiphospholipid antibody level.   The patient, after last being seen, was asked to drop predn isone down to 5 mg daily and then 5 mg every other day. She is now seeing Dr. Miono Gonzáles who is assessing her as well and we sent additional laboratory studies, demonstrating mild elevation in antiphospholipid antibodies as well as glycoprotein antibody . It was also noted that her TTP level was up to 63. Ms. Glass indicates that she is being considered for potentially lupus as an underlying diagnosis. Dr. Gonzáles would like to check her off steroids and thus we discussed tapering off further at this po int with platelet count is now reasonably acceptable.   The patient thereafter came off steroids and has been doing relatively well. As she now however, presents back it is recognized that her platelet count has been  slowly dropping. This includes the 05/27 /2014 assessment with a platelet count of 30,000 with assessment on 06/11/2014 at 25,000 and today when seen on 06/13/2014 the platelet count is 20,000 with IPF 14.5. The patient states she is having increasing joint pain but has had no evidence of ecchym oses or petechial lesions and no additional gingival bleeding, epistaxis or vaginal bleeding. She in fact feels reasonably good except for the joint pain that she feels is manageable. She is, however, concerned about her platelet count and rightly so.   The patient thereafter was started on Plaquenil through Rheumatology and over the next several weeks, she had felt somewhat better. In fact when she is seen back today, 07/14/2014, she no longer has joint discomfort. She does have thrombocytopenia, however, a t approximately the same levels as she did previously. She had been on steroids in the last several weeks as they were tapered off. This was revealed when her platelet count was 23,000 three weeks ago and is recognized today. Today, however, her platelet count is 28,000. She has had no additional bleeding issues however, since last seen.   The patient, after being reviewed in July 2014, was asked to followup with plans for her to remain on Plaquenil, initiate Imuran which began in August. She stayed on th e medication, fortunately tolerating it well and as she was seen back today, 11/17/2014, overall feels as if she is having fewer rheumatologic symptoms. Unfortunately, hematologically, she remains [id number] with her platelet count at close to 60, 000.   The patient thereafter has followed up with rheumatology on an every 2 month basis and now returned back here 4 months from previous on 03/23/2015. Her symptoms for her rheumatologic disorder remains under control though her Imuran has been moved to 150 mg a day as well as her Plaquenil, maintained at the same dose. She feels reasonably good overall today  without any new symptoms, but concerned about her platelet count that was close to 40,000 when reviewed today. The patient has had no bleeding in g ums, epistaxis, GI or  tract.   The patient is seen back in followup and indicates that her rheumatologic symptoms remain under control with Imuran and Plaquenil. We find wonderfully enough that her platelet count is also improved to approximately 100,000 when seen in the office today as well.   Patient is now next reviewed June 01, 2016.  She is feeling well overall without additional rheumatologic symptoms.  She has been on Imuran for approximately 2 years and is a question of how long she'll need to stay on it.  She fortunately is hematologically stable when reviewed today platelet count of 116,000, and otherwise normal CBC     The patient is now reviewed December 50,016.  As a result of running out of her Imuran not having it refilled by a rheumatologist she has remained off Imuran.  She, fortunately, has not had any additional symptoms including rash development and/or joint discomfort.  We discussed rechecking her anticardiolipin antibodies, beta-2 glycoprotein antibodies and lupus anticoagulant at this point since she has a further degree of thrombocytopenia also present today.    The patient's subsequent laboratory studies were otherwise negative and she is now seen back May 31, 2017.  She continues to feel well without any joint disturbance but she does note bilateral malar rash which she tends to cover with makeup.    The patient presented February 18, 2022 to [facility] for preventive examination at this point having fairly heavy periods-?  Ablation or hysterectomy.Subsequent CBC including H&H of 13.3 and 41.4 white count of 9200, MCV 82.8, MCH of 26.6 and platelet count of 68,000, normal automated differential. Subsequent pelvic ultrasound revealed homogenous appearance to endometrium per uterus, endometrial borders well-defined, no intracavitary mass,  normal endometrial thickness, normal cervix, right ovary normal, left ovary of 2 cysts 1 at just over 60 mm and possible endometrioma and 1 at just over 30 mm also possible endometrioma.  Unfortunately by late April 2022 her degree of menorrhagia was worsening and she was referred to Dr. Craven who felt she should undergo a laparoscopic hysterectomy, bilateral salpingo-oophorectomy unilateral oophorectomy and possible staging possible laparotomy.  She is now referred back to try to improve her platelet count prior to any surgical procedure.     The patient is seen 5/31/2022 indicating that she has been routinely assessed by rheumatology at every 6 months while she is remained on Plaquenil undergoing laboratory studies and ophthalmologic assessments every 6 months.  She has had no issues until recently indicating her platelet count has been between 65 and 80,000.  She understands the above issues and the need to try to bring her platelet count up prior to surgical intervention by Dr. Craven.  She has had no symptoms, however, of progressive rheumatologic disease including lack of Raynaud's, further malar rash, joint discomfort, shortness of breath, cough, abdominal pain, change in bowel habit, joint swelling or neuropathy.  Additionally she has had no pelvic discomfort.    Patient responded quickly to p.o. steroids at 1 mg/kg with repeat CBC 6/7/2022 and platelet count of 273,000, IPF now reduced to 3.6, previous  level 90.1.  Patient was dropped to 40 mg daily next seen 6/14/2022 with platelet count 197,000, prednisone dropped to 30 mg daily and plans to reassess 6/20/2022.    The patient seen 6/20/2022 feeling well, platelet count at 111,000, IPF at 4.0.  Her surgery is now scheduled 7/1/2022 we plan to continue her current dosing check and her platelet count preoperatively 6/29/2022.    Patient's assessment 6/29 included a platelet count of 1 13,000 she was admitted 7/1 through 7/2/2022 undergoing robotic  assisted total laparoscopic hysterectomy, BSO and unilateral oophorectomy.  Pathology revealed left ovarian endometrioma, cervix with endometriosis, secretory endometrium, adenomyosis, bilateral fallopian tubes with paratubal cysts, left fallopian tube with endometriosis and uterine serosa with endometriosis.    She is next seen back 7/13/2022.  She continues to recover postoperatively and we discussed a further steroid taper.    Patient assessed at 2-month intervals formally evaluate 11/2/2022 with essentially stable findings per her degree of thrombocytopenia.  Yearly follow-up planned.    Patient is next seen 11/2/2023 stable with acceptable platelet count and IPF level.    Patient is next seen 12/9/2024 with IPF 8.0, CBC with H&H 13.7 and 40.3, white count 8160 and platelet count of 70,000.    She is now  again with an excellent performance status and no change in her symptoms.  She continues on Plaquenil and unchanged doses.  She does have evidence of increasing thrombocytopenia however with IPF only minimally elevated.  After discussion we plan to reassess her antiphospholipid antibody syndrome studies and have her seen back in short-term interval follow-up.    The patient is reviewed 12/30/2024.  Recent studies include additive anticardiolipin antibodies, elevated beta-2 glycoprotein antibody IgG at 83, negative lupus anticoagulant  Case discussed with Dr. Gonzáles and plans made to increase her Plaquenil 300 mg p.o. daily there being evidence of activation of her lupus with the above findings.    Past Medical History:   Diagnosis Date    Cytopenia     Endometriosis 2006    Gestational thrombocytopenia     Hx of folliculitis     IN THE LEFT OUTER ASPECT OF LABIA    ITP (idiopathic thrombocytopenic purpura)     INCLUDING PLATELET CLUMPING    Joint pain     Left ovarian cyst     Lupus     Seasonal allergies     Thrombocytopenia      ONCOLOGIC HISTORY:  (History from previous dates can be found in the separate  "document.)    Current Outpatient Medications on File Prior to Visit   Medication Sig Dispense Refill    cetirizine (zyrTEC) 10 MG tablet Take  by mouth Daily.      hydroxychloroquine (PLAQUENIL) 200 MG tablet Take 1 tablet by mouth Every Night.      multivitamin with minerals tablet tablet Take 1 tablet by mouth Daily. HOLDING FOR DOS      predniSONE (DELTASONE) 20 MG tablet 20mg daily x 2 days, 10mg daily x 3 days, 10mg every other day x 4 days, then off 15 tablet 0     No current facility-administered medications on file prior to visit.     ALLERGIES:     Allergies   Allergen Reactions    Bactrim [Sulfamethoxazole-Trimethoprim] Rash       Social History     Socioeconomic History    Marital status:    Tobacco Use    Smoking status: Never    Smokeless tobacco: Never   Vaping Use    Vaping status: Never Used   Substance and Sexual Activity    Alcohol use: Yes     Comment: SOCIALLY    Drug use: No         Cancer-related family history includes Testicular cancer in her brother. There is no history of Cancer.     Review of Systems  A comprehensive 14 point review of systems was performed and was negative except as mentioned.    Objective     Vitals:    12/30/24 0802   BP: 105/73   Pulse: 89   Resp: 16   Temp: 97.9 °F (36.6 °C)   TempSrc: Oral   SpO2: 98%   Weight: 55.5 kg (122 lb 6.4 oz)   Height: 160 cm (62.99\")   PainSc: 0-No pain               12/30/2024     8:02 AM   Current Status   ECOG score 0     Physical Exam    GENERAL: Well-developed, well-nourished female in no acute distress.   SKIN: Warm, dry without rashes, purpura or petechiae.   HEAD: Normocephalic.   EYES: Pupils equal, round and reactive to light. EOMs intact. Conjunctivae normal.   EARS: Hearing intact.   NOSE: Septum midline. No excoriations or nasal discharge.   MOUTH: Tongue is well-papillated; no stomatitis or ulcers. Lips normal.   THROAT: Oropharynx with occasional whitish plaque?  Early thrush  NECK: Supple with good range of motion; no " thyromegaly or masses, no JVD or bruits.   LYMPHATICS: No cervical, supraclavicular, axillary or inguinal adenopathy.   CHEST: Lungs clear to percussion and auscultation.   CARDIAC: Regular rate and rhythm without murmurs, rubs or gallops.   ABDOMEN: Soft, nontender with no organomegaly or masses.   EXTREMITIES: No clubbing, cyanosis or edema.   NEUROLOGICAL: No focal neurological deficits.   RECENT LABS:  Hematology WBC   Date Value Ref Range Status   12/30/2024 7.76 3.40 - 10.80 10*3/mm3 Final     RBC   Date Value Ref Range Status   12/30/2024 5.23 3.77 - 5.28 10*6/mm3 Final     Hemoglobin   Date Value Ref Range Status   12/30/2024 14.8 12.0 - 15.9 g/dL Final     Hematocrit   Date Value Ref Range Status   12/30/2024 43.9 34.0 - 46.6 % Final     Platelets   Date Value Ref Range Status   12/30/2024 68 (L) 140 - 450 10*3/mm3 Final     Platelets (Citrated Blood)   Date Value Ref Range Status   12/09/2024 68 (L) 140 - 450 10*3/mm3 Final        Assessment & Plan   *ITP  44-year-old female with a history of idiopathic thrombocytopenic purpura during previous pregnancy.  In addition, she has had a degree of pseudothrombocytopenia noted on periodic blood smear and likely a component of gestational thrombocytopenia previously.   She had more recently a relapse of ITP, improved on steroid dosing. Her symptoms suggested she be tested further for rheumatologic disorder. She was seen by Dr. Gonzáles who diagnosed systemic lupus erythematosus, which evidently includes a component of antiphospholipid antibody syndrome as well.   The patient was placed on Plaquenil and later Imuran, to which she continues to respond.   Her platelet count had been noted to improved somewhat when she was seen in June of this year.    Now which is reviewed in December she continues to do well clinically though her platelet count has dropped modestly with a very minimally elevated IPF.  It was suggested rechecking her anticardiolipin antibodies, beta-2  glycoprotein antibodies and lupus anticoagulant today while she remained off Imuran.  These studies were negative.  The patient presented February 18, 2022 to [facility] for preventive examination at this point having fairly heavy periods-?  Ablation or hysterectomy.  Subsequent CBC including H&H of 13.3 and 41.4 white count of 9200, MCV 82.8, MCH of 26.6 and platelet count of 68,000, normal automated differential.   Unfortunately by late April 2022 her degree of menorrhagia was worsening and she was referred to Dr. Craven who felt she should undergo a laparoscopic hysterectomy, bilateral salpingo-oophorectomy unilateral oophorectomy and possible staging possible laparotomy.  She is now referred back to try to improve her platelet count prior to any surgical procedure.  The patient is seen 5/31/2022.  She has had no issues until recently indicating her platelet count has been between 65 and 80,000.  She was initiated on prednisone 20 mg 3 times daily (40 mg at breakfast, 20 mg in the afternoon).  6/7/2022, platelet count today has significantly improved, now normal at 273,000.  Discussed with Dr. Eastman, patient will be cleared from our standpoint for surgery with Dr. Craven.  Because she responded so well to prednisone, we will reduce dose to 40 mg.  She will return in 1 week for CBC and MD follow-up to potentially continue to taper prednisone.  Patient seen 6/14/2022 with platelet count of 197,000, prednisone dropped to 30 mg daily, follow-up assessment 6/20/2022 same day as gynecologic oncology.  Patient assessed 6/20/2022 with platelet count of 1 11,000, normal IPF, prednisone 30 mg daily continued  Upon completion of surgery patient came off of steroids but unfortunately experience steroid withdrawal requiring a steroid taper which we put into place at 20 mg followed by 10 mg followed by every other day dosing.  Patient seen 7/13/2022 with acceptable platelet count, prednisone reduced to 5 mg every other  day for 4 days then discontinue.  Follow-up assessments at 2 and 4 months planned.  Repeat assessments negative for worsening for thrombocytopenia, stable 11/2/2022 with yearly follow-up planned.  Patient stable 11/2/2023, yearly follow-up planned  Patient seen 12/9/2024, stable clinically though with further thrombocytopenia.  Patient reevaluated 12/30/24 with stable thrombocytopenia.  Plans made to increase Plaquenil to 300 mg p.o. daily, monthly CBC IPF x 2, 3-month follow-up MD    *Systemic lupus erythematosus  She was seen by Dr. Gonzáles who diagnosed systemic lupus erythematosus, which evidently includes a component of antiphospholipid antibody syndrome as well.   The patient was placed on Plaquenil and later Imuran, to which she continues to respond.   It was suggested rechecking her anticardiolipin antibodies, beta-2 glycoprotein antibodies and lupus anticoagulant today while she remained off Imuran.  These studies were negative.  She continues to follow with rheumatology every 6 months while she is on Plaquenil.  She undergoes every 6-month ophthalmologic assessment and lab assessment.  She is now seen 12/9/2024 again with an excellent performance status and no change in her symptoms.  She continues on Plaquenil and unchanged doses.  She does have evidence of increasing thrombocytopenia however with IPF only minimally elevated.  After discussion we plan to reassess her antiphospholipid antibody syndrome studies and have her seen back in short-term interval follow-up.  The patient is reviewed 12/ 30/2024.  Recent studies include additive anticardiolipin antibodies, elevated beta-2 glycoprotein antibody IgG at 83, negative lupus anticoagulant.  Again case discussed with Dr. Gonzáles and plans made to increase her Plaquenil to 300 mg daily.  The patient is agreeable to return monthly for CBC and platelet count being seen back in 3 months.      *Menorrhagia  The patient presented February 18, 2022 to [facility] for  preventive examination at this point having fairly heavy periods-?  Ablation or hysterectomy.  Subsequent pelvic ultrasound revealed homogenous appearance to endometrium per uterus, endometrial borders well-defined, no intracavitary mass, normal endometrial thickness, normal cervix, right ovary normal, left ovary of 2 cysts 1 at just over 60 mm and possible endometrioma and 1 at just over 30 mm also possible endometrioma.    Unfortunately by late April 2022 her degree of menorrhagia was worsening and she was referred to Dr. Craven who felt she should undergo a laparoscopic hysterectomy, bilateral salpingo-oophorectomy unilateral oophorectomy and possible staging possible laparotomy.  She is now referred back to try to improve her platelet count prior to any surgical procedure.  Ca1 25 elevated at 90.1 indicating that patient needs to have cysts removed.  Cyst themselves can elevate this marker.  We will work to improve platelet count so patient can have procedure.  Patient will now be cleared for surgery from oncology standpoint, his platelets have returned to normal at 273,000.  Patient subsequently admitted 7/1 through 7/2/2022 undergoing robotic assisted total laparoscopic hysterectomy, BSO and unilateral oophorectomy.  Pathology revealed left ovarian endometrioma, cervix with endometriosis, secretory endometrium, adenomyosis, bilateral fallopian tubes with paratubal cysts, left fallopian tube with endometriosis and uterine serosa with endometriosis.  Stable symptoms 12/30/2024      Jerad Eastman MD  12/30/24

## 2024-12-30 ENCOUNTER — OFFICE VISIT (OUTPATIENT)
Dept: ONCOLOGY | Facility: CLINIC | Age: 44
End: 2024-12-30
Payer: COMMERCIAL

## 2024-12-30 ENCOUNTER — LAB (OUTPATIENT)
Dept: LAB | Facility: HOSPITAL | Age: 44
End: 2024-12-30
Payer: COMMERCIAL

## 2024-12-30 VITALS
DIASTOLIC BLOOD PRESSURE: 73 MMHG | TEMPERATURE: 97.9 F | OXYGEN SATURATION: 98 % | BODY MASS INDEX: 21.69 KG/M2 | HEIGHT: 63 IN | WEIGHT: 122.4 LBS | SYSTOLIC BLOOD PRESSURE: 105 MMHG | RESPIRATION RATE: 16 BRPM | HEART RATE: 89 BPM

## 2024-12-30 DIAGNOSIS — D68.61 ANTIPHOSPHOLIPID ANTIBODY SYNDROME: ICD-10-CM

## 2024-12-30 DIAGNOSIS — D69.3 IDIOPATHIC THROMBOCYTOPENIC PURPURA: Primary | ICD-10-CM

## 2024-12-30 DIAGNOSIS — D69.3 IDIOPATHIC THROMBOCYTOPENIC PURPURA: ICD-10-CM

## 2024-12-30 LAB
BASOPHILS # BLD AUTO: 0.04 10*3/MM3 (ref 0–0.2)
BASOPHILS NFR BLD AUTO: 0.5 % (ref 0–1.5)
DEPRECATED RDW RBC AUTO: 37 FL (ref 37–54)
EOSINOPHIL # BLD AUTO: 0.13 10*3/MM3 (ref 0–0.4)
EOSINOPHIL NFR BLD AUTO: 1.7 % (ref 0.3–6.2)
ERYTHROCYTE [DISTWIDTH] IN BLOOD BY AUTOMATED COUNT: 12.2 % (ref 12.3–15.4)
HCT VFR BLD AUTO: 43.9 % (ref 34–46.6)
HGB BLD-MCNC: 14.8 G/DL (ref 12–15.9)
IMM GRANULOCYTES # BLD AUTO: 0.06 10*3/MM3 (ref 0–0.05)
IMM GRANULOCYTES NFR BLD AUTO: 0.8 % (ref 0–0.5)
LYMPHOCYTES # BLD AUTO: 1.7 10*3/MM3 (ref 0.7–3.1)
LYMPHOCYTES NFR BLD AUTO: 21.9 % (ref 19.6–45.3)
MCH RBC QN AUTO: 28.3 PG (ref 26.6–33)
MCHC RBC AUTO-ENTMCNC: 33.7 G/DL (ref 31.5–35.7)
MCV RBC AUTO: 83.9 FL (ref 79–97)
MONOCYTES # BLD AUTO: 0.55 10*3/MM3 (ref 0.1–0.9)
MONOCYTES NFR BLD AUTO: 7.1 % (ref 5–12)
NEUTROPHILS NFR BLD AUTO: 5.28 10*3/MM3 (ref 1.7–7)
NEUTROPHILS NFR BLD AUTO: 68 % (ref 42.7–76)
NRBC BLD AUTO-RTO: 0 /100 WBC (ref 0–0.2)
PLATELET # BLD AUTO: 68 10*3/MM3 (ref 140–450)
PLATELETS.RETICULATED NFR BLD AUTO: 7.6 % (ref 0.9–6.5)
PMV BLD AUTO: 10.8 FL (ref 6–12)
RBC # BLD AUTO: 5.23 10*6/MM3 (ref 3.77–5.28)
WBC NRBC COR # BLD AUTO: 7.76 10*3/MM3 (ref 3.4–10.8)

## 2024-12-30 PROCEDURE — 36415 COLL VENOUS BLD VENIPUNCTURE: CPT

## 2024-12-30 PROCEDURE — 85025 COMPLETE CBC W/AUTO DIFF WBC: CPT

## 2024-12-30 PROCEDURE — 85055 RETICULATED PLATELET ASSAY: CPT

## 2024-12-30 PROCEDURE — 99214 OFFICE O/P EST MOD 30 MIN: CPT | Performed by: INTERNAL MEDICINE

## 2024-12-30 NOTE — LETTER
December 30, 2024     Shady Rivera MD  [address] [facility] [address]    Patient: Regi Glass   YOB: 1980   Date of Visit: 12/30/2024     Dear Shady Rivera MD:       Thank you for referring Regi Glass to me for evaluation. Below are the relevant portions of my assessment and plan of care.    If you have questions, please do not hesitate to call me. I look forward to following Regi along with you.         Sincerely,        Jerad Eastman MD        CC: MD Minoo Randolph MD Kommor, Michael D., MD  12/30/24 0841  Sign when Signing Visit       REASONS FOR FOLLOWUP:    Idiopathic thrombocytopenic purpura.   Variable thrombocytopenia including platelet clumping. ?Gestational thrombocytopenia noted when reviewed 01/16/2012 and 02/14/2012.   Patient seen 01/16/2012 when 16 weeks pregnant.   Patient seen 05/01/2012 nearing term. Platelet count approximately 80,000.   Patient seen 01/24/2014, further thrombocytopenia, now associated with increasing joint pain-polyarticular joint garo n, rheumatologic assessment initiated, consult requested, and prednisone initiated 20 mg oral daily.   Patient status post rheumatologic assessment and results pending, platelet count responsive to oral steroids, reduced; steroid taper planned.   Patient seen on 02/21/2014, platelet count approximately 70,000 on 5 mg every other day per prednisone. Rheumatologic assessment ongoing. SLA(?). Mild increase in antiphospholipid antibody levels.   The patient seen on 06/13/2014, Plaquenil initiated for apparel sy stemic lupus erythematosus associated elevated antiphospholipid antibody, further thrombocytopenia noted in our office and to restart steroids over a 2 week period.   The patient seen 07/14/2014 - improvement per generalized rheumatologic symptoms from cyto penia persisting, Plaquenil continued. Recheck over the subsequent 2 months planned.   Patient seen 11/10/2014, platelet  count approximately 50,000, pending stabilization of rheumatologic symptoms. Reassessment in 4 months planned.   Patient was seen on 03 /23/2015, platelet count between 40,000 and 50,000, again with stabilization of rheumatologic symptoms, reassessment in 6 months' time.   Patient seen 09/16/2015, platelet count of 92,000, improvement of rheumatologic symptoms, every 6 month assessments pl anned.   Patient seen June 01, 2016 stable, platelet count 1 or 16,000 additional rheumatologic symptoms controlled?  Length of Imuran use  Patient reviewed December 5, off Imuran for approximate 6 months, anticardiolipin antibodies, lupus anticoagulant, beta-2 glycoprotein antibodies rechecked  Patient reviewed May 31, 2017 clinically stable, platelet count acceptable, yearly follow-up planned  Patient reassessed 5/31/22 with bilateral ovarian cysts, plans for surgical intervention per gynecologic oncology, reassessment per antiphospholipid antibody syndrome, steroids initiated with prednisone 1 mg/kg  Patient seen in follow-up 6/14/2022 responding to p.o. steroids, adjusted as needed.  Patient seen 6/20/2022, acceptable platelet count, surgery planned 7/1/2022  Patient status post laparoscopic hysterectomy July 2022 with benign findings  Reassessment 11/2/2022-repeat CBC at acceptable levels.  Again review of pathology with benign findings-endometriosis  Patient seen 11/2/2023 stable for SLE and ITP.  Assessment 12/9/2024 with increasing thrombocytopenia?,  Repeat citrated tube assessment as well as antibody studies-DONNA, beta-2 glycoprotein and lupus anticoagulant.  Subsequent discussion with rheumatology, Plaquenil moved to 300 mg daily           The patient was seen 12/9/2024 with ongoing excellent performance status over the previous year.  We have discussed that she has no increase in her rheumatologic symptoms including assessment in June of this year continuing her current Plaquenil dosing.       Her previous history  included the patient being admitted 7/1 through 7/2/2022 undergoing robotic assisted total laparoscopic hysterectomy, BSO and unilateral oophorectomy.  Pathology revealed left ovarian endometrioma, cervix with endometriosis, secretory endometrium, adenomyosis, bilateral fallopian tubes with paratubal cysts, left fallopian tube with endometriosis and uterine serosa with endometriosis.  Unfortunately she did develop steroid withdrawal symptoms and needed to taper with an additional treatment plan which was put into place and which was effective.  She seen back in office 7/13/2022 feeling considerably better we plan further steroid taper.    A 1 year follow-up was requested and she  was next seen 11/2/2023.  She is feeling well without any symptoms related to her SLE and her degree of thrombocytopenia is generally improved with acceptable IPF..    She is now seen 12/9/2024 again with an excellent performance status and no change in her symptoms.  She continues on Plaquenil and unchanged doses.  She does have evidence of increasing thrombocytopenia however with IPF only minimally elevated.  After discussion we plan to reassess her antiphospholipid antibody syndrome studies and have her seen back in short-term interval follow-up.    The patient is reviewed 12/ 30/2024.  Recent studies include additive anticardiolipin antibodies, elevated beta-2 glycoprotein antibody IgG at 83, negative lupus anticoagulant.  We have discussed that often lower platelet count findings do not need to be treated.  We want to be certain there is no additional issues such as worsening ITP which does not appear to be the case when she is tested now at 12/30/2024 with platelet count of  68,000, IPF of 7.6.  She is feeling well without any additional symptoms referable to her rheumatologic disease.  We have called her rheumatologist-Dr. Gonzáles-and discussed her dosing for Plaquenil deciding to move to 300 mg p.o. daily.        HEMATOLOGY  HISTORY:  The patient is now a 44-year-old female with a history of ITP and variable thrombocytopenia also associated with platelet clumping and gestational thrombocytopenia. The patient was seen during her pregnancy 2012,  and again 2012 at approximately 27 weeks, platelet count 70,000-80,000. She was admitted to [facility] with a platelet count of 1000 along with red purpura and petechiae. She has a history of thrombocytopenia dating to  when she was pregnant with a platelet count in the 50,000 range. She was also treated throughout her pregnancy with Lovenox at prophylactic doses due to prior miscarriages. She tolerated this well with no bleeding complications, platelet count appar ently spontaneously improved to the 80,000 range, eventually was 95,000 at the time of .   Approximately 10 years prior to hospitalization here at [facility], she had an area of folliculitis in the left outer aspect of labia requi ring incision and drainage treated empirically with antibiotics with Bactrim and doxycycline which she received over 10 days.   On the morning of admission she noted development of diffuse erythematous rash on her abdomen and back as well as lower extremities and buccal mucosa. She presented to Dr. Lee in [place] and was found to have a white count of 5000, hemoglobin 14.2 a n d platelet count of 1000. She was transferred to [facility] for admission and treatment for presumed ITP. Admitting studies include a CRP of .6, ANTONI 1:80 positive, , IPF 31.8, initial CBC with platelet count of 2000, H and H 13.9 , 40.7, WBC 4140. She was placed on Decadron orally and IVIG. Sedimentation rate was found to be 8 and on the  IPF was still 31.7. AFO screening was also positive incidentally and CMV antibody IgG was greater than 13.2, IgM .28, EBV antibodies were a lso considerably high, EBV antibody/nuclear antigen  greater than 600 and EBV antibodies ECA IgG of 450 with an EBV antibody early IgG of 17. All of these are excessively high. The patient did fortunately respond however, by the 26th platelet count was u p to 84,000, IPF 6.7. It was elected at this point for the patient to be discharged home and followed back in the office with weekly counts and tapered steroids thereafter.   She had her counts done each week including 12/13/2010 at which time she was 201 ,000 and when seen on 12/20/2010, platelet count was 136,000. In reviewing her circumstances on that day, she went up to 30 mg of steroids and had her cut back very slowly on a weekly basis.   She returned on 01/17/2011, feeling well. Her IPF is also at normal levels today. Additional studies have been done to be certain that she does have additional hypercoagulable state as well including lupus anticoagulant including lipid antibody screening. She has had both Pneumovax and meningococcal vaccine but no Haemophilus.   The patient presented back to the office 02/14/2011 after tapering off steroids and has been off of them for 1 week. Fortunately she remained relatively stable with platelet count 116,000 and we elected to follow her on an every other we ek basis. Fortunately her subsequent counts have been stable. She has been able to complete her vaccinations as necessary. Her subsequent testing here has shown platelets in the 205,000 range on 03/28/2011, 168,000 on 04/11/2011 and 148 on 04/18/2011. She continues to feel well otherwise and has agreed that she would have monthly checks for a period of time longer for at least a 6 month period from her hospitalization.   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 She may have had a viral illness as of late. Her counts checked 05/17/2011 with a platelet count of 198,000,  06/13/2011 of 138,000. As she returns it i s clear that her platelet count is actually lower into the near 90,000 range. We discussed this in part as possibly not necessarily related to ICP since her IPF is actually quite low and her smears do not show enlarged platelets.   As a result of the ab ove the patient was asked to have counts done closer to home and these were done at every two week intervals with a considerable variation seen. This includes on 08/03 of 146,000, 08/10 of 168,000, 08/17 of 113,000, 8/24 of 96,000 and today 106,000 here in the office. As a result of these findings in its variability thereof suggests that perhaps the patient has platelet clumping ongoing, and today we will test her for this as well.   The patient thereafter continued her usual lifestyle. [facility] for her to try to become pregnant and she did quite quickly do so. Thereafter she now sees Dr. Vic Lock, high-risk obstetrics, and her platelet count checked there approximately every other week has been dropping. She is now seen back in our office and actually has a platelet count of 56,000 with an IPF of 7. A peripheral smear is currently pending.   As result of review 01/16/2012 the patient had multifactorial reasons for her thrombocytopenia including ITP, likely additional pl atelet clumping, and? gestational thrombocytopenia. In any case, she was reasonably stable at that point and we elected to follow her counts every other week. She returns back today having done this. She was also placed on aspirin by her high-risk OB a n d she is having increasing gum bleeding likely as a result of several reasons now. Her most recent platelet counts including 84,000 on 02/06/2012 and 68,000 when seen today, 02/14/2012. IPF interestingly is 4.7. Her pregnancy has otherwise gone well wi th no additional issues thus far. She is approximately 20 weeks' gestation.   The patient was asked to be checked at her  family physicians office. She also followed up with her high risk OB and has platelet count ranging from 60,000 to as much as 80,000. Today when seen her platelet count is 79,000, IPF of 6.5. I discussed with Dr. Tomi Lock her high risk OB and plans were to try to hold off steroids unless she is below 50,000 wherein she would also discontinue aspirin. When seen today the pregn leanne has progressed nicely without any complication thus far. It is not clear if her delivery date is to be scheduled, but one expects that it might well be.   The patient's case was discussed with Dr. Tomi Lock with plans for her to again hold off steroids until she is below 50,000 at which time she will discontinue aspirin.   When seen again on 2012, pregnancy has progressed nicely and she is to see Dr. Tomi Lock in approximately 2 weeks. It is likely that she will undergo .   The patient did proceed onto steroids just prior to her pregnancy and fortunately did quite well peripartum. The patient had not been seen since that time approximately a year when she is now seen back 2013. She had presented to her PCP' s office jus t recently with abdominal pain, slowly worsening without nausea, vomiting or change in bowel habits. She underwent CT scan of the abdomen, pelvis without abnormalities noted though her blood count was somewhat suspicious with an elevated white count to 18 , 000 and platelet count down to 33,000. Normal hemoglobin and hematocrit 14.3 and 41.97. Patient rechecked two days later with platelet count of 43,000 and white count apparently normal? She now presents back to our practice for reassessment feeling much b tereza per her abdominal pain with a normal performance status, otherwise no change in bowel function.   The patient thereafter was asked to be seen back and now presents 2014. She has been noticing in the last several weeks to months, pain in multiple joints including right  hip, left shoulder and the bilateral knees. She notes a general stiffness throughout multiple joints in the a.m. as well. This is a new finding for her and she is quite troubled by it. She was undergoing assessment for it recently with additional laboratory studies done including 10/10/2013 with a platelet count found to be reduced to 86,000.   Additional laboratory results done also in [place] in June 2013 include normal serum chemistries; platelet count now is 53,000, WBC 8700, he moglobin and hematocrit 14.6, and 43.6. ANTONI titer positive, RA rheumatoid factor of 7, ASO of 137, uric acid 3.7. The patient was thereafter referred back to our practice for her thrombocytopenia. She feels well today except for again a degree of joint d iscomfort in the distribution as described above.   The patient as a result of the above and suspicions for rheumatologic disease was asked to undergo a series of studies. This included an ANTONI comprehensive panel that revealed anti-double-stranded DNA elevated at 21. Additionally, her RA panel suggested e a rly rheumatoid arthritis with a positive anti-CCP. As the patient's studies with Dr. Gonzáles however were not yet completed at the time she is seen back in our office. The patient at this point had continued steroids though when seen back in the office 01/3 1 /2014 her platelet count was 199,000. She was asked to taper prednisone down to 10 mg daily and when seen 02/07/2014 her platelet count is 89,000. The patient feels well overall today when seen. The joint discomfort has improved remarkably on the steroi d doses.   Patient seen on 02/21/2014 platelet count approximately 70,000 on 5 mg every other day per prednisone. Rheumatologic assessment ongoing mild increase in antiphospholipid antibody level.   The patient, after last being seen, was asked to drop predn isone down to 5 mg daily and then 5 mg every other day. She is now seeing Dr. Minoo Gonzáles who is assessing her as well and we  sent additional laboratory studies, demonstrating mild elevation in antiphospholipid antibodies as well as glycoprotein antibody . It was also noted that her TTP level was up to 63. Ms. Glass indicates that she is being considered for potentially lupus as an underlying diagnosis. Dr. Gonzáles would like to check her off steroids and thus we discussed tapering off further at this po int with platelet count is now reasonably acceptable.   The patient thereafter came off steroids and has been doing relatively well. As she now however, presents back it is recognized that her platelet count has been slowly dropping. This includes the 05/27 /2014 assessment with a platelet count of 30,000 with assessment on 06/11/2014 at 25,000 and today when seen on 06/13/2014 the platelet count is 20,000 with IPF 14.5. The patient states she is having increasing joint pain but has had no evidence of ecchym oses or petechial lesions and no additional gingival bleeding, epistaxis or vaginal bleeding. She in fact feels reasonably good except for the joint pain that she feels is manageable. She is, however, concerned about her platelet count and rightly so.   The patient thereafter was started on Plaquenil through Rheumatology and over the next several weeks, she had felt somewhat better. In fact when she is seen back today, 07/14/2014, she no longer has joint discomfort. She does have thrombocytopenia, however, a t approximately the same levels as she did previously. She had been on steroids in the last several weeks as they were tapered off. This was revealed when her platelet count was 23,000 three weeks ago and is recognized today. Today, however, her platelet count is 28,000. She has had no additional bleeding issues however, since last seen.   The patient, after being reviewed in July 2014, was asked to followup with plans for her to remain on Plaquenil, initiate Imuran which began in August. She stayed on th e medication, fortunately  tolerating it well and as she was seen back today, 11/17/2014, overall feels as if she is having fewer rheumatologic symptoms. Unfortunately, hematologically, she remains [id number] with her platelet count at close to 60, 000.   The patient thereafter has followed up with rheumatology on an every 2 month basis and now returned back here 4 months from previous on 03/23/2015. Her symptoms for her rheumatologic disorder remains under control though her Imuran has been moved to 150 mg a day as well as her Plaquenil, maintained at the same dose. She feels reasonably good overall today without any new symptoms, but concerned about her platelet count that was close to 40,000 when reviewed today. The patient has had no bleeding in g ums, epistaxis, GI or  tract.   The patient is seen back in followup and indicates that her rheumatologic symptoms remain under control with Imuran and Plaquenil. We find wonderfully enough that her platelet count is also improved to approximately 100,000 when seen in the office today as well.   Patient is now next reviewed June 01, 2016.  She is feeling well overall without additional rheumatologic symptoms.  She has been on Imuran for approximately 2 years and is a question of how long she'll need to stay on it.  She fortunately is hematologically stable when reviewed today platelet count of 116,000, and otherwise normal CBC     The patient is now reviewed December 50,016.  As a result of running out of her Imuran not having it refilled by a rheumatologist she has remained off Imuran.  She, fortunately, has not had any additional symptoms including rash development and/or joint discomfort.  We discussed rechecking her anticardiolipin antibodies, beta-2 glycoprotein antibodies and lupus anticoagulant at this point since she has a further degree of thrombocytopenia also present today.    The patient's subsequent laboratory studies were otherwise negative and she is now seen back May  31, 2017.  She continues to feel well without any joint disturbance but she does note bilateral malar rash which she tends to cover with makeup.    The patient presented February 18, 2022 to [facility] for preventive examination at this point having fairly heavy periods-?  Ablation or hysterectomy.Subsequent CBC including H&H of 13.3 and 41.4 white count of 9200, MCV 82.8, MCH of 26.6 and platelet count of 68,000, normal automated differential. Subsequent pelvic ultrasound revealed homogenous appearance to endometrium per uterus, endometrial borders well-defined, no intracavitary mass, normal endometrial thickness, normal cervix, right ovary normal, left ovary of 2 cysts 1 at just over 60 mm and possible endometrioma and 1 at just over 30 mm also possible endometrioma.  Unfortunately by late April 2022 her degree of menorrhagia was worsening and she was referred to Dr. Craven who felt she should undergo a laparoscopic hysterectomy, bilateral salpingo-oophorectomy unilateral oophorectomy and possible staging possible laparotomy.  She is now referred back to try to improve her platelet count prior to any surgical procedure.     The patient is seen 5/31/2022 indicating that she has been routinely assessed by rheumatology at every 6 months while she is remained on Plaquenil undergoing laboratory studies and ophthalmologic assessments every 6 months.  She has had no issues until recently indicating her platelet count has been between 65 and 80,000.  She understands the above issues and the need to try to bring her platelet count up prior to surgical intervention by Dr. Craven.  She has had no symptoms, however, of progressive rheumatologic disease including lack of Raynaud's, further malar rash, joint discomfort, shortness of breath, cough, abdominal pain, change in bowel habit, joint swelling or neuropathy.  Additionally she has had no pelvic discomfort.    Patient responded quickly to p.o. steroids at 1 mg/kg with  repeat CBC 6/7/2022 and platelet count of 273,000, IPF now reduced to 3.6, previous  level 90.1.  Patient was dropped to 40 mg daily next seen 6/14/2022 with platelet count 197,000, prednisone dropped to 30 mg daily and plans to reassess 6/20/2022.    The patient seen 6/20/2022 feeling well, platelet count at 111,000, IPF at 4.0.  Her surgery is now scheduled 7/1/2022 we plan to continue her current dosing check and her platelet count preoperatively 6/29/2022.    Patient's assessment 6/29 included a platelet count of 1 13,000 she was admitted 7/1 through 7/2/2022 undergoing robotic assisted total laparoscopic hysterectomy, BSO and unilateral oophorectomy.  Pathology revealed left ovarian endometrioma, cervix with endometriosis, secretory endometrium, adenomyosis, bilateral fallopian tubes with paratubal cysts, left fallopian tube with endometriosis and uterine serosa with endometriosis.    She is next seen back 7/13/2022.  She continues to recover postoperatively and we discussed a further steroid taper.    Patient assessed at 2-month intervals formally evaluate 11/2/2022 with essentially stable findings per her degree of thrombocytopenia.  Yearly follow-up planned.    Patient is next seen 11/2/2023 stable with acceptable platelet count and IPF level.    Patient is next seen 12/9/2024 with IPF 8.0, CBC with H&H 13.7 and 40.3, white count 8160 and platelet count of 70,000.    She is now  again with an excellent performance status and no change in her symptoms.  She continues on Plaquenil and unchanged doses.  She does have evidence of increasing thrombocytopenia however with IPF only minimally elevated.  After discussion we plan to reassess her antiphospholipid antibody syndrome studies and have her seen back in short-term interval follow-up.    The patient is reviewed 12/30/2024.  Recent studies include additive anticardiolipin antibodies, elevated beta-2 glycoprotein antibody IgG at 83, negative lupus  anticoagulant  Case discussed with Dr. Gonzáles and plans made to increase her Plaquenil 300 mg p.o. daily there being evidence of activation of her lupus with the above findings.    Past Medical History:   Diagnosis Date   • Cytopenia    • Endometriosis 2006   • Gestational thrombocytopenia    • Hx of folliculitis     IN THE LEFT OUTER ASPECT OF LABIA   • ITP (idiopathic thrombocytopenic purpura)     INCLUDING PLATELET CLUMPING   • Joint pain    • Left ovarian cyst    • Lupus    • Seasonal allergies    • Thrombocytopenia      ONCOLOGIC HISTORY:  (History from previous dates can be found in the separate document.)    Current Outpatient Medications on File Prior to Visit   Medication Sig Dispense Refill   • cetirizine (zyrTEC) 10 MG tablet Take  by mouth Daily.     • hydroxychloroquine (PLAQUENIL) 200 MG tablet Take 1 tablet by mouth Every Night.     • multivitamin with minerals tablet tablet Take 1 tablet by mouth Daily. HOLDING FOR DOS     • predniSONE (DELTASONE) 20 MG tablet 20mg daily x 2 days, 10mg daily x 3 days, 10mg every other day x 4 days, then off 15 tablet 0     No current facility-administered medications on file prior to visit.     ALLERGIES:     Allergies   Allergen Reactions   • Bactrim [Sulfamethoxazole-Trimethoprim] Rash       Social History     Socioeconomic History   • Marital status:    Tobacco Use   • Smoking status: Never   • Smokeless tobacco: Never   Vaping Use   • Vaping status: Never Used   Substance and Sexual Activity   • Alcohol use: Yes     Comment: SOCIALLY   • Drug use: No         Cancer-related family history includes Testicular cancer in her brother. There is no history of Cancer.     Review of Systems  A comprehensive 14 point review of systems was performed and was negative except as mentioned.    Objective    Vitals:    12/30/24 0802   BP: 105/73   Pulse: 89   Resp: 16   Temp: 97.9 °F (36.6 °C)   TempSrc: Oral   SpO2: 98%   Weight: 55.5 kg (122 lb 6.4 oz)   Height: 160 cm  "(62.99\")   PainSc: 0-No pain               12/30/2024     8:02 AM   Current Status   ECOG score 0     Physical Exam    GENERAL: Well-developed, well-nourished female in no acute distress.   SKIN: Warm, dry without rashes, purpura or petechiae.   HEAD: Normocephalic.   EYES: Pupils equal, round and reactive to light. EOMs intact. Conjunctivae normal.   EARS: Hearing intact.   NOSE: Septum midline. No excoriations or nasal discharge.   MOUTH: Tongue is well-papillated; no stomatitis or ulcers. Lips normal.   THROAT: Oropharynx with occasional whitish plaque?  Early thrush  NECK: Supple with good range of motion; no thyromegaly or masses, no JVD or bruits.   LYMPHATICS: No cervical, supraclavicular, axillary or inguinal adenopathy.   CHEST: Lungs clear to percussion and auscultation.   CARDIAC: Regular rate and rhythm without murmurs, rubs or gallops.   ABDOMEN: Soft, nontender with no organomegaly or masses.   EXTREMITIES: No clubbing, cyanosis or edema.   NEUROLOGICAL: No focal neurological deficits.   RECENT LABS:  Hematology WBC   Date Value Ref Range Status   12/30/2024 7.76 3.40 - 10.80 10*3/mm3 Final     RBC   Date Value Ref Range Status   12/30/2024 5.23 3.77 - 5.28 10*6/mm3 Final     Hemoglobin   Date Value Ref Range Status   12/30/2024 14.8 12.0 - 15.9 g/dL Final     Hematocrit   Date Value Ref Range Status   12/30/2024 43.9 34.0 - 46.6 % Final     Platelets   Date Value Ref Range Status   12/30/2024 68 (L) 140 - 450 10*3/mm3 Final     Platelets (Citrated Blood)   Date Value Ref Range Status   12/09/2024 68 (L) 140 - 450 10*3/mm3 Final        Assessment & Plan  *ITP  44-year-old female with a history of idiopathic thrombocytopenic purpura during previous pregnancy.  In addition, she has had a degree of pseudothrombocytopenia noted on periodic blood smear and likely a component of gestational thrombocytopenia previously.   She had more recently a relapse of ITP, improved on steroid dosing. Her symptoms " suggested she be tested further for rheumatologic disorder. She was seen by Dr. Gonzáles who diagnosed systemic lupus erythematosus, which evidently includes a component of antiphospholipid antibody syndrome as well.   The patient was placed on Plaquenil and later Imuran, to which she continues to respond.   Her platelet count had been noted to improved somewhat when she was seen in June of this year.    Now which is reviewed in December she continues to do well clinically though her platelet count has dropped modestly with a very minimally elevated IPF.  It was suggested rechecking her anticardiolipin antibodies, beta-2 glycoprotein antibodies and lupus anticoagulant today while she remained off Imuran.  These studies were negative.  The patient presented February 18, 2022 to [facility] for preventive examination at this point having fairly heavy periods-?  Ablation or hysterectomy.  Subsequent CBC including H&H of 13.3 and 41.4 white count of 9200, MCV 82.8, MCH of 26.6 and platelet count of 68,000, normal automated differential.   Unfortunately by late April 2022 her degree of menorrhagia was worsening and she was referred to Dr. Craven who felt she should undergo a laparoscopic hysterectomy, bilateral salpingo-oophorectomy unilateral oophorectomy and possible staging possible laparotomy.  She is now referred back to try to improve her platelet count prior to any surgical procedure.  The patient is seen 5/31/2022.  She has had no issues until recently indicating her platelet count has been between 65 and 80,000.  She was initiated on prednisone 20 mg 3 times daily (40 mg at breakfast, 20 mg in the afternoon).  6/7/2022, platelet count today has significantly improved, now normal at 273,000.  Discussed with Dr. Eastman, patient will be cleared from our standpoint for surgery with Dr. Craven.  Because she responded so well to prednisone, we will reduce dose to 40 mg.  She will return in 1 week for CBC and MD  follow-up to potentially continue to taper prednisone.  Patient seen 6/14/2022 with platelet count of 197,000, prednisone dropped to 30 mg daily, follow-up assessment 6/20/2022 same day as gynecologic oncology.  Patient assessed 6/20/2022 with platelet count of 1 11,000, normal IPF, prednisone 30 mg daily continued  Upon completion of surgery patient came off of steroids but unfortunately experience steroid withdrawal requiring a steroid taper which we put into place at 20 mg followed by 10 mg followed by every other day dosing.  Patient seen 7/13/2022 with acceptable platelet count, prednisone reduced to 5 mg every other day for 4 days then discontinue.  Follow-up assessments at 2 and 4 months planned.  Repeat assessments negative for worsening for thrombocytopenia, stable 11/2/2022 with yearly follow-up planned.  Patient stable 11/2/2023, yearly follow-up planned  Patient seen 12/9/2024, stable clinically though with further thrombocytopenia.  Patient reevaluated 12/30/24 with stable thrombocytopenia.  Plans made to increase Plaquenil to 300 mg p.o. daily, monthly CBC IPF x 2, 3-month follow-up MD    *Systemic lupus erythematosus  She was seen by Dr. Gonzáles who diagnosed systemic lupus erythematosus, which evidently includes a component of antiphospholipid antibody syndrome as well.   The patient was placed on Plaquenil and later Imuran, to which she continues to respond.   It was suggested rechecking her anticardiolipin antibodies, beta-2 glycoprotein antibodies and lupus anticoagulant today while she remained off Imuran.  These studies were negative.  She continues to follow with rheumatology every 6 months while she is on Plaquenil.  She undergoes every 6-month ophthalmologic assessment and lab assessment.  She is now seen 12/9/2024 again with an excellent performance status and no change in her symptoms.  She continues on Plaquenil and unchanged doses.  She does have evidence of increasing thrombocytopenia  however with IPF only minimally elevated.  After discussion we plan to reassess her antiphospholipid antibody syndrome studies and have her seen back in short-term interval follow-up.  The patient is reviewed 12/ 30/2024.  Recent studies include additive anticardiolipin antibodies, elevated beta-2 glycoprotein antibody IgG at 83, negative lupus anticoagulant.  Again case discussed with Dr. Gonzáles and plans made to increase her Plaquenil to 300 mg daily.  The patient is agreeable to return monthly for CBC and platelet count being seen back in 3 months.      *Menorrhagia  The patient presented February 18, 2022 to [facility] for preventive examination at this point having fairly heavy periods-?  Ablation or hysterectomy.  Subsequent pelvic ultrasound revealed homogenous appearance to endometrium per uterus, endometrial borders well-defined, no intracavitary mass, normal endometrial thickness, normal cervix, right ovary normal, left ovary of 2 cysts 1 at just over 60 mm and possible endometrioma and 1 at just over 30 mm also possible endometrioma.    Unfortunately by late April 2022 her degree of menorrhagia was worsening and she was referred to Dr. Craven who felt she should undergo a laparoscopic hysterectomy, bilateral salpingo-oophorectomy unilateral oophorectomy and possible staging possible laparotomy.  She is now referred back to try to improve her platelet count prior to any surgical procedure.  Ca1 25 elevated at 90.1 indicating that patient needs to have cysts removed.  Cyst themselves can elevate this marker.  We will work to improve platelet count so patient can have procedure.  Patient will now be cleared for surgery from oncology standpoint, his platelets have returned to normal at 273,000.  Patient subsequently admitted 7/1 through 7/2/2022 undergoing robotic assisted total laparoscopic hysterectomy, BSO and unilateral oophorectomy.  Pathology revealed left ovarian endometrioma, cervix with  endometriosis, secretory endometrium, adenomyosis, bilateral fallopian tubes with paratubal cysts, left fallopian tube with endometriosis and uterine serosa with endometriosis.  Stable symptoms 12/30/2024      Jerad Eastman MD  12/30/24

## 2025-01-27 ENCOUNTER — LAB (OUTPATIENT)
Dept: LAB | Facility: HOSPITAL | Age: 45
End: 2025-01-27
Payer: COMMERCIAL

## 2025-01-27 DIAGNOSIS — D69.3 IDIOPATHIC THROMBOCYTOPENIC PURPURA: Primary | ICD-10-CM

## 2025-01-27 DIAGNOSIS — D69.3 IDIOPATHIC THROMBOCYTOPENIC PURPURA: ICD-10-CM

## 2025-01-27 DIAGNOSIS — D68.61 ANTIPHOSPHOLIPID ANTIBODY SYNDROME: ICD-10-CM

## 2025-01-27 LAB
BASOPHILS # BLD AUTO: 0.04 10*3/MM3 (ref 0–0.2)
BASOPHILS NFR BLD AUTO: 0.5 % (ref 0–1.5)
DEPRECATED RDW RBC AUTO: 38.1 FL (ref 37–54)
EOSINOPHIL # BLD AUTO: 0.1 10*3/MM3 (ref 0–0.4)
EOSINOPHIL NFR BLD AUTO: 1.3 % (ref 0.3–6.2)
ERYTHROCYTE [DISTWIDTH] IN BLOOD BY AUTOMATED COUNT: 12.1 % (ref 12.3–15.4)
HCT VFR BLD AUTO: 40.2 % (ref 34–46.6)
HGB BLD-MCNC: 13.1 G/DL (ref 12–15.9)
IMM GRANULOCYTES # BLD AUTO: 0.02 10*3/MM3 (ref 0–0.05)
IMM GRANULOCYTES NFR BLD AUTO: 0.3 % (ref 0–0.5)
LYMPHOCYTES # BLD AUTO: 1.86 10*3/MM3 (ref 0.7–3.1)
LYMPHOCYTES NFR BLD AUTO: 25 % (ref 19.6–45.3)
MCH RBC QN AUTO: 28.1 PG (ref 26.6–33)
MCHC RBC AUTO-ENTMCNC: 32.6 G/DL (ref 31.5–35.7)
MCV RBC AUTO: 86.1 FL (ref 79–97)
MONOCYTES # BLD AUTO: 0.5 10*3/MM3 (ref 0.1–0.9)
MONOCYTES NFR BLD AUTO: 6.7 % (ref 5–12)
NEUTROPHILS NFR BLD AUTO: 4.91 10*3/MM3 (ref 1.7–7)
NEUTROPHILS NFR BLD AUTO: 66.2 % (ref 42.7–76)
NRBC BLD AUTO-RTO: 0 /100 WBC (ref 0–0.2)
PLATELET # BLD AUTO: 65 10*3/MM3 (ref 140–450)
PLATELETS.RETICULATED NFR BLD AUTO: 7.3 % (ref 0.9–6.5)
PMV BLD AUTO: 10.6 FL (ref 6–12)
RBC # BLD AUTO: 4.67 10*6/MM3 (ref 3.77–5.28)
WBC NRBC COR # BLD AUTO: 7.43 10*3/MM3 (ref 3.4–10.8)

## 2025-01-27 PROCEDURE — 85025 COMPLETE CBC W/AUTO DIFF WBC: CPT

## 2025-01-27 PROCEDURE — 85055 RETICULATED PLATELET ASSAY: CPT

## 2025-01-27 PROCEDURE — 36415 COLL VENOUS BLD VENIPUNCTURE: CPT

## 2025-02-24 ENCOUNTER — LAB (OUTPATIENT)
Dept: LAB | Facility: HOSPITAL | Age: 45
End: 2025-02-24
Payer: COMMERCIAL

## 2025-02-24 DIAGNOSIS — D68.61 ANTIPHOSPHOLIPID ANTIBODY SYNDROME: ICD-10-CM

## 2025-02-24 DIAGNOSIS — D69.3 IDIOPATHIC THROMBOCYTOPENIC PURPURA: ICD-10-CM

## 2025-02-24 LAB
BASOPHILS # BLD AUTO: 0.03 10*3/MM3 (ref 0–0.2)
BASOPHILS NFR BLD AUTO: 0.3 % (ref 0–1.5)
DEPRECATED RDW RBC AUTO: 38.5 FL (ref 37–54)
EOSINOPHIL # BLD AUTO: 0.1 10*3/MM3 (ref 0–0.4)
EOSINOPHIL NFR BLD AUTO: 1.1 % (ref 0.3–6.2)
ERYTHROCYTE [DISTWIDTH] IN BLOOD BY AUTOMATED COUNT: 12 % (ref 12.3–15.4)
HCT VFR BLD AUTO: 40.8 % (ref 34–46.6)
HGB BLD-MCNC: 13.4 G/DL (ref 12–15.9)
IMM GRANULOCYTES # BLD AUTO: 0.02 10*3/MM3 (ref 0–0.05)
IMM GRANULOCYTES NFR BLD AUTO: 0.2 % (ref 0–0.5)
LYMPHOCYTES # BLD AUTO: 1.72 10*3/MM3 (ref 0.7–3.1)
LYMPHOCYTES NFR BLD AUTO: 19.4 % (ref 19.6–45.3)
MCH RBC QN AUTO: 28.5 PG (ref 26.6–33)
MCHC RBC AUTO-ENTMCNC: 32.8 G/DL (ref 31.5–35.7)
MCV RBC AUTO: 86.6 FL (ref 79–97)
MONOCYTES # BLD AUTO: 0.71 10*3/MM3 (ref 0.1–0.9)
MONOCYTES NFR BLD AUTO: 8 % (ref 5–12)
NEUTROPHILS NFR BLD AUTO: 6.3 10*3/MM3 (ref 1.7–7)
NEUTROPHILS NFR BLD AUTO: 71 % (ref 42.7–76)
NRBC BLD AUTO-RTO: 0 /100 WBC (ref 0–0.2)
PLATELET # BLD AUTO: 63 10*3/MM3 (ref 140–450)
PLATELETS.RETICULATED NFR BLD AUTO: 7.6 % (ref 0.9–6.5)
PMV BLD AUTO: 10 FL (ref 6–12)
RBC # BLD AUTO: 4.71 10*6/MM3 (ref 3.77–5.28)
WBC NRBC COR # BLD AUTO: 8.88 10*3/MM3 (ref 3.4–10.8)

## 2025-02-24 PROCEDURE — 85055 RETICULATED PLATELET ASSAY: CPT

## 2025-02-24 PROCEDURE — 36415 COLL VENOUS BLD VENIPUNCTURE: CPT

## 2025-02-24 PROCEDURE — 85025 COMPLETE CBC W/AUTO DIFF WBC: CPT

## 2025-03-10 ENCOUNTER — APPOINTMENT (OUTPATIENT)
Dept: WOMENS IMAGING | Facility: HOSPITAL | Age: 45
End: 2025-03-10
Payer: COMMERCIAL

## 2025-03-10 PROCEDURE — 76642 ULTRASOUND BREAST LIMITED: CPT | Performed by: RADIOLOGY

## 2025-03-10 PROCEDURE — 77065 DX MAMMO INCL CAD UNI: CPT | Performed by: RADIOLOGY

## 2025-03-10 PROCEDURE — 77061 BREAST TOMOSYNTHESIS UNI: CPT | Performed by: RADIOLOGY

## 2025-03-10 PROCEDURE — G0279 TOMOSYNTHESIS, MAMMO: HCPCS | Performed by: RADIOLOGY

## 2025-03-27 NOTE — PROGRESS NOTES
REASONS FOR FOLLOWUP:    Idiopathic thrombocytopenic purpura.   Variable thrombocytopenia including platelet clumping. ?Gestational thrombocytopenia noted when reviewed 01/16/2012 and 02/14/2012.   Patient seen 01/16/2012 when 16 weeks pregnant.   Patient seen 05/01/2012 nearing term. Platelet count approximately 80,000.   Patient seen 01/24/2014, further thrombocytopenia, now associated with increasing joint pain-polyarticular joint garo n, rheumatologic assessment initiated, consult requested, and prednisone initiated 20 mg oral daily.   Patient status post rheumatologic assessment and results pending, platelet count responsive to oral steroids, reduced; steroid taper planned.   Patient seen on 02/21/2014, platelet count approximately 70,000 on 5 mg every other day per prednisone. Rheumatologic assessment ongoing. SLA(?). Mild increase in antiphospholipid antibody levels.   The patient seen on 06/13/2014, Plaquenil initiated for apparel sy stemic lupus erythematosus associated elevated antiphospholipid antibody, further thrombocytopenia noted in our office and to restart steroids over a 2 week period.   The patient seen 07/14/2014 - improvement per generalized rheumatologic symptoms from cyto penia persisting, Plaquenil continued. Recheck over the subsequent 2 months planned.   Patient seen 11/10/2014, platelet count approximately 50,000, pending stabilization of rheumatologic symptoms. Reassessment in 4 months planned.   Patient was seen on 03 /23/2015, platelet count between 40,000 and 50,000, again with stabilization of rheumatologic symptoms, reassessment in 6 months' time.   Patient seen 09/16/2015, platelet count of 92,000, improvement of rheumatologic symptoms, every 6 month assessments pl anned.   Patient seen June 01, 2016 stable, platelet count 1 or 16,000 additional rheumatologic symptoms controlled?  Length of Imuran use  Patient reviewed December 5, off Imuran for approximate 6 months,  anticardiolipin antibodies, lupus anticoagulant, beta-2 glycoprotein antibodies rechecked  Patient reviewed May 31, 2017 clinically stable, platelet count acceptable, yearly follow-up planned  Patient reassessed 5/31/22 with bilateral ovarian cysts, plans for surgical intervention per gynecologic oncology, reassessment per antiphospholipid antibody syndrome, steroids initiated with prednisone 1 mg/kg  Patient seen in follow-up 6/14/2022 responding to p.o. steroids, adjusted as needed.  Patient seen 6/20/2022, acceptable platelet count, surgery planned 7/1/2022  Patient status post laparoscopic hysterectomy July 2022 with benign findings  Reassessment 11/2/2022-repeat CBC at acceptable levels.  Again review of pathology with benign findings-endometriosis  Patient seen 11/2/2023 stable for SLE and ITP.  Assessment 12/9/2024 with increasing thrombocytopenia?,  Repeat citrated tube assessment as well as antibody studies-DONNA, beta-2 glycoprotein and lupus anticoagulant.  Subsequent discussion with rheumatology, Plaquenil moved to 300 mg daily  Stabilization of thrombocytopenia noted 3/20/2025.  She is tolerating increased baclofen without side effect           The patient was seen 12/9/2024 with ongoing excellent performance status over the previous year.  We have discussed that she has no increase in her rheumatologic symptoms including assessment in June of this year continuing her current Plaquenil dosing.       Her previous history included the patient being admitted 7/1 through 7/2/2022 undergoing robotic assisted total laparoscopic hysterectomy, BSO and unilateral oophorectomy.  Pathology revealed left ovarian endometrioma, cervix with endometriosis, secretory endometrium, adenomyosis, bilateral fallopian tubes with paratubal cysts, left fallopian tube with endometriosis and uterine serosa with endometriosis.  Unfortunately she did develop steroid withdrawal symptoms and needed to taper with an additional treatment  plan which was put into place and which was effective.  She seen back in office 7/13/2022 feeling considerably better we plan further steroid taper.    A 1 year follow-up was requested and she  was next seen 11/2/2023.  She is feeling well without any symptoms related to her SLE and her degree of thrombocytopenia is generally improved with acceptable IPF..    She is now seen 12/9/2024 again with an excellent performance status and no change in her symptoms.  She continues on Plaquenil and unchanged doses.  She does have evidence of increasing thrombocytopenia however with IPF only minimally elevated.  After discussion we plan to reassess her antiphospholipid antibody syndrome studies and have her seen back in short-term interval follow-up.    The patient is reviewed 12/ 30/2024.  Recent studies include additive anticardiolipin antibodies, elevated beta-2 glycoprotein antibody IgG at 83, negative lupus anticoagulant.  We have discussed that often lower platelet count findings do not need to be treated.  We want to be certain there is no additional issues such as worsening ITP which does not appear to be the case when she is tested now at 12/30/2024 with platelet count of  68,000, IPF of 7.6.  She is feeling well without any additional symptoms referable to her rheumatologic disease.  We have called her rheumatologist-Dr. Gonzáles-and discussed her dosing for Plaquenil deciding to move to 300 mg p.o. daily.    The patient is next seen 3/28/2025 with repeat CBC include H&H of 14.1 and 42.2 with white count 7830 and platelet count now 88,000, IPF reduced to 6.3.  Again she is tolerating Plaquenil without additional side effects plan to continue Plaquenil at current dosing        HEMATOLOGY HISTORY:  The patient is now a 45-year-old female with a history of ITP and variable thrombocytopenia also associated with platelet clumping and gestational thrombocytopenia. The patient was seen during her pregnancy 01/16/2012, 02/   and again 2012 at approximately 27 weeks, platelet count 70,000-80,000. She was admitted to [facility] with a platelet count of 1000 along with red purpura and petechiae. She has a history of thrombocytopenia dating to  when she was pregnant with a platelet count in the 50,000 range. She was also treated throughout her pregnancy with Lovenox at prophylactic doses due to prior miscarriages. She tolerated this well with no bleeding complications, platelet count appar ently spontaneously improved to the 80,000 range, eventually was 95,000 at the time of .   Approximately 10 years prior to hospitalization here at [facility], she had an area of folliculitis in the left outer aspect of labia requi ring incision and drainage treated empirically with antibiotics with Bactrim and doxycycline which she received over 10 days.   On the morning of admission she noted development of diffuse erythematous rash on her abdomen and back as well as lower extremities and buccal mucosa. She presented to Dr. Lee in [place] and was found to have a white count of 5000, hemoglobin 14.2 a n d platelet count of 1000. She was transferred to [facility] for admission and treatment for presumed ITP. Admitting studies include a CRP of .6, ANTONI 1:80 positive, , IPF 31.8, initial CBC with platelet count of 2000, H and H 13.9 , 40.7, WBC 4140. She was placed on Decadron orally and IVIG. Sedimentation rate was found to be 8 and on the  IPF was still 31.7. AFO screening was also positive incidentally and CMV antibody IgG was greater than 13.2, IgM .28, EBV antibodies were a lso considerably high, EBV antibody/nuclear antigen greater than 600 and EBV antibodies ECA IgG of 450 with an EBV antibody early IgG of 17. All of these are excessively high. The patient did fortunately respond however, by the  platelet count was u p to 84,000, IPF 6.7. It was  elected at this point for the patient to be discharged home and followed back in the office with weekly counts and tapered steroids thereafter.   She had her counts done each week including 12/13/2010 at which time she was 201 ,000 and when seen on 12/20/2010, platelet count was 136,000. In reviewing her circumstances on that day, she went up to 30 mg of steroids and had her cut back very slowly on a weekly basis.   She returned on 01/17/2011, feeling well. Her IPF is also at normal levels today. Additional studies have been done to be certain that she does have additional hypercoagulable state as well including lupus anticoagulant including lipid antibody screening. She has had both Pneumovax and meningococcal vaccine but no Haemophilus.   The patient presented back to the office 02/14/2011 after tapering off steroids and has been off of them for 1 week. Fortunately she remained relatively stable with platelet count 116,000 and we elected to follow her on an every other we ek basis. Fortunately her subsequent counts have been stable. She has been able to complete her vaccinations as necessary. Her subsequent testing here has shown platelets in the 205,000 range on 03/28/2011, 168,000 on 04/11/2011 and 148 on 04/18/2011. She continues to feel well otherwise and has agreed that she would have monthly checks for a period of time longer for at least a 6 month period from her hospitalization.   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 She may have had a viral illness as of late. Her counts checked 05/17/2011 with a platelet count of 198,000, 06/13/2011 of 138,000. As she returns it i s clear that her platelet count is actually lower into the near 90,000 range. We discussed this in part as possibly not necessarily related to ICP since her IPF is actually quite low and her  smears do not show enlarged platelets.   As a result of the ab ove the patient was asked to have counts done closer to home and these were done at every two week intervals with a considerable variation seen. This includes on 08/03 of 146,000, 08/10 of 168,000, 08/17 of 113,000, 8/24 of 96,000 and today 106,000 here in the office. As a result of these findings in its variability thereof suggests that perhaps the patient has platelet clumping ongoing, and today we will test her for this as well.   The patient thereafter continued her usual lifestyle. [facility] made plans for her to try to become pregnant and she did quite quickly do so. Thereafter she now sees Dr. Vic Lock, high-risk obstetrics, and her platelet count checked there approximately every other week has been dropping. She is now seen back in our office and actually has a platelet count of 56,000 with an IPF of 7. A peripheral smear is currently pending.   As result of review 01/16/2012 the patient had multifactorial reasons for her thrombocytopenia including ITP, likely additional pl atelet clumping, and? gestational thrombocytopenia. In any case, she was reasonably stable at that point and we elected to follow her counts every other week. She returns back today having done this. She was also placed on aspirin by her high-risk OB a n d she is having increasing gum bleeding likely as a result of several reasons now. Her most recent platelet counts including 84,000 on 02/06/2012 and 68,000 when seen today, 02/14/2012. IPF interestingly is 4.7. Her pregnancy has otherwise gone well wi th no additional issues thus far. She is approximately 20 weeks' gestation.   The patient was asked to be checked at her family physicians office. She also followed up with her high risk OB and has platelet count ranging from 60,000 to as much as 80,000. Today when seen her platelet count is 79,000, IPF of 6.5. I discussed with Dr. Tomi Lock her high  risk OB and plans were to try to hold off steroids unless she is below 50,000 wherein she would also discontinue aspirin. When seen today the pregn leanne has progressed nicely without any complication thus far. It is not clear if her delivery date is to be scheduled, but one expects that it might well be.   The patient's case was discussed with Dr. Tomi Lock with plans for her to again hold off steroids until she is below 50,000 at which time she will discontinue aspirin.   When seen again on 2012, pregnancy has progressed nicely and she is to see Dr. Tomi Lock in approximately 2 weeks. It is likely that she will undergo .   The patient did proceed onto steroids just prior to her pregnancy and fortunately did quite well peripartum. The patient had not been seen since that time approximately a year when she is now seen back 2013. She had presented to her PCP' s office jus t recently with abdominal pain, slowly worsening without nausea, vomiting or change in bowel habits. She underwent CT scan of the abdomen, pelvis without abnormalities noted though her blood count was somewhat suspicious with an elevated white count to 18 , 000 and platelet count down to 33,000. Normal hemoglobin and hematocrit 14.3 and 41.97. Patient rechecked two days later with platelet count of 43,000 and white count apparently normal? She now presents back to our practice for reassessment feeling much b tereza per her abdominal pain with a normal performance status, otherwise no change in bowel function.   The patient thereafter was asked to be seen back and now presents 2014. She has been noticing in the last several weeks to months, pain in multiple joints including right hip, left shoulder and the bilateral knees. She notes a general stiffness throughout multiple joints in the a.m. as well. This is a new finding for her and she is quite troubled by it. She was undergoing assessment for it recently with  additional laboratory studies done including 10/10/2013 with a platelet count found to be reduced to 86,000.   Additional laboratory results done also in [place] in June 2013 include normal serum chemistries; platelet count now is 53,000, WBC 8700, he moglobin and hematocrit 14.6, and 43.6. ANTONI titer positive, RA rheumatoid factor of 7, ASO of 137, uric acid 3.7. The patient was thereafter referred back to our practice for her thrombocytopenia. She feels well today except for again a degree of joint d iscomfort in the distribution as described above.   The patient as a result of the above and suspicions for rheumatologic disease was asked to undergo a series of studies. This included an ANTONI comprehensive panel that revealed anti-double-stranded DNA elevated at 21. Additionally, her RA panel suggested e a rly rheumatoid arthritis with a positive anti-CCP. As the patient's studies with Dr. Gonzáles however were not yet completed at the time she is seen back in our office. The patient at this point had continued steroids though when seen back in the office 01/3 1 /2014 her platelet count was 199,000. She was asked to taper prednisone down to 10 mg daily and when seen 02/07/2014 her platelet count is 89,000. The patient feels well overall today when seen. The joint discomfort has improved remarkably on the steroi d doses.   Patient seen on 02/21/2014 platelet count approximately 70,000 on 5 mg every other day per prednisone. Rheumatologic assessment ongoing mild increase in antiphospholipid antibody level.   The patient, after last being seen, was asked to drop predn isone down to 5 mg daily and then 5 mg every other day. She is now seeing Dr. Minoo Gonzáles who is assessing her as well and we sent additional laboratory studies, demonstrating mild elevation in antiphospholipid antibodies as well as glycoprotein antibody . It was also noted that her TTP level was up to 63. Ms. Glass indicates that she is being considered  for potentially lupus as an underlying diagnosis. Dr. Gonzáles would like to check her off steroids and thus we discussed tapering off further at this po int with platelet count is now reasonably acceptable.   The patient thereafter came off steroids and has been doing relatively well. As she now however, presents back it is recognized that her platelet count has been slowly dropping. This includes the 05/27 /2014 assessment with a platelet count of 30,000 with assessment on 06/11/2014 at 25,000 and today when seen on 06/13/2014 the platelet count is 20,000 with IPF 14.5. The patient states she is having increasing joint pain but has had no evidence of ecchym oses or petechial lesions and no additional gingival bleeding, epistaxis or vaginal bleeding. She in fact feels reasonably good except for the joint pain that she feels is manageable. She is, however, concerned about her platelet count and rightly so.   The patient thereafter was started on Plaquenil through Rheumatology and over the next several weeks, she had felt somewhat better. In fact when she is seen back today, 07/14/2014, she no longer has joint discomfort. She does have thrombocytopenia, however, a t approximately the same levels as she did previously. She had been on steroids in the last several weeks as they were tapered off. This was revealed when her platelet count was 23,000 three weeks ago and is recognized today. Today, however, her platelet count is 28,000. She has had no additional bleeding issues however, since last seen.   The patient, after being reviewed in July 2014, was asked to followup with plans for her to remain on Plaquenil, initiate Imuran which began in August. She stayed on th e medication, fortunately tolerating it well and as she was seen back today, 11/17/2014, overall feels as if she is having fewer rheumatologic symptoms. Unfortunately, hematologically, she remains [id number] with her platelet count at close to 60,  000.   The patient thereafter has followed up with rheumatology on an every 2 month basis and now returned back here 4 months from previous on 03/23/2015. Her symptoms for her rheumatologic disorder remains under control though her Imuran has been moved to 150 mg a day as well as her Plaquenil, maintained at the same dose. She feels reasonably good overall today without any new symptoms, but concerned about her platelet count that was close to 40,000 when reviewed today. The patient has had no bleeding in g ums, epistaxis, GI or  tract.   The patient is seen back in followup and indicates that her rheumatologic symptoms remain under control with Imuran and Plaquenil. We find wonderfully enough that her platelet count is also improved to approximately 100,000 when seen in the office today as well.   Patient is now next reviewed June 01, 2016.  She is feeling well overall without additional rheumatologic symptoms.  She has been on Imuran for approximately 2 years and is a question of how long she'll need to stay on it.  She fortunately is hematologically stable when reviewed today platelet count of 116,000, and otherwise normal CBC     The patient is now reviewed December 50,016.  As a result of running out of her Imuran not having it refilled by a rheumatologist she has remained off Imuran.  She, fortunately, has not had any additional symptoms including rash development and/or joint discomfort.  We discussed rechecking her anticardiolipin antibodies, beta-2 glycoprotein antibodies and lupus anticoagulant at this point since she has a further degree of thrombocytopenia also present today.    The patient's subsequent laboratory studies were otherwise negative and she is now seen back May 31, 2017.  She continues to feel well without any joint disturbance but she does note bilateral malar rash which she tends to cover with makeup.    The patient presented February 18, 2022 to [facility] for preventive examination at  this point having fairly heavy periods-?  Ablation or hysterectomy.Subsequent CBC including H&H of 13.3 and 41.4 white count of 9200, MCV 82.8, MCH of 26.6 and platelet count of 68,000, normal automated differential. Subsequent pelvic ultrasound revealed homogenous appearance to endometrium per uterus, endometrial borders well-defined, no intracavitary mass, normal endometrial thickness, normal cervix, right ovary normal, left ovary of 2 cysts 1 at just over 60 mm and possible endometrioma and 1 at just over 30 mm also possible endometrioma.  Unfortunately by late April 2022 her degree of menorrhagia was worsening and she was referred to Dr. Craven who felt she should undergo a laparoscopic hysterectomy, bilateral salpingo-oophorectomy unilateral oophorectomy and possible staging possible laparotomy.  She is now referred back to try to improve her platelet count prior to any surgical procedure.     The patient is seen 5/31/2022 indicating that she has been routinely assessed by rheumatology at every 6 months while she is remained on Plaquenil undergoing laboratory studies and ophthalmologic assessments every 6 months.  She has had no issues until recently indicating her platelet count has been between 65 and 80,000.  She understands the above issues and the need to try to bring her platelet count up prior to surgical intervention by Dr. Craven.  She has had no symptoms, however, of progressive rheumatologic disease including lack of Raynaud's, further malar rash, joint discomfort, shortness of breath, cough, abdominal pain, change in bowel habit, joint swelling or neuropathy.  Additionally she has had no pelvic discomfort.    Patient responded quickly to p.o. steroids at 1 mg/kg with repeat CBC 6/7/2022 and platelet count of 273,000, IPF now reduced to 3.6, previous  level 90.1.  Patient was dropped to 40 mg daily next seen 6/14/2022 with platelet count 197,000, prednisone dropped to 30 mg daily and plans  to reassess 6/20/2022.    The patient seen 6/20/2022 feeling well, platelet count at 111,000, IPF at 4.0.  Her surgery is now scheduled 7/1/2022 we plan to continue her current dosing check and her platelet count preoperatively 6/29/2022.    Patient's assessment 6/29 included a platelet count of 1 13,000 she was admitted 7/1 through 7/2/2022 undergoing robotic assisted total laparoscopic hysterectomy, BSO and unilateral oophorectomy.  Pathology revealed left ovarian endometrioma, cervix with endometriosis, secretory endometrium, adenomyosis, bilateral fallopian tubes with paratubal cysts, left fallopian tube with endometriosis and uterine serosa with endometriosis.    She is next seen back 7/13/2022.  She continues to recover postoperatively and we discussed a further steroid taper.    Patient assessed at 2-month intervals formally evaluate 11/2/2022 with essentially stable findings per her degree of thrombocytopenia.  Yearly follow-up planned.    Patient is next seen 11/2/2023 stable with acceptable platelet count and IPF level.    Patient is next seen 12/9/2024 with IPF 8.0, CBC with H&H 13.7 and 40.3, white count 8160 and platelet count of 70,000.    She is now  again with an excellent performance status and no change in her symptoms.  She continues on Plaquenil and unchanged doses.  She does have evidence of increasing thrombocytopenia however with IPF only minimally elevated.  After discussion we plan to reassess her antiphospholipid antibody syndrome studies and have her seen back in short-term interval follow-up.    The patient is reviewed 12/30/2024.  Recent studies include additive anticardiolipin antibodies, elevated beta-2 glycoprotein antibody IgG at 83, negative lupus anticoagulant  Case discussed with Dr. Gonzáles and plans made to increase her Plaquenil 300 mg p.o. daily there being evidence of activation of her lupus with the above findings.    The patient is next seen 3/28/2025 with repeat CBC include  H&H of 14.1 and 42.2 with white count 7830 and platelet count now 88,000, IPF reduced to 6.3.  Plans made to continue Plaquenil at current dosing.       Past Medical History:   Diagnosis Date    Cytopenia     Endometriosis 2006    Gestational thrombocytopenia     Hx of folliculitis     IN THE LEFT OUTER ASPECT OF LABIA    ITP (idiopathic thrombocytopenic purpura)     INCLUDING PLATELET CLUMPING    Joint pain     Left ovarian cyst     Lupus     Seasonal allergies     Thrombocytopenia      ONCOLOGIC HISTORY:  (History from previous dates can be found in the separate document.)    Current Outpatient Medications on File Prior to Visit   Medication Sig Dispense Refill    cetirizine (zyrTEC) 10 MG tablet Take  by mouth Daily.      hydroxychloroquine (PLAQUENIL) 200 MG tablet Take 1 tablet by mouth Every Night.      multivitamin with minerals tablet tablet Take 1 tablet by mouth Daily. HOLDING FOR DOS      vitamin D3 (Vitamin D) 125 MCG (5000 UT) capsule capsule Take 1 capsule by mouth Daily.      predniSONE (DELTASONE) 20 MG tablet 20mg daily x 2 days, 10mg daily x 3 days, 10mg every other day x 4 days, then off 15 tablet 0     No current facility-administered medications on file prior to visit.     ALLERGIES:     Allergies   Allergen Reactions    Bactrim [Sulfamethoxazole-Trimethoprim] Rash       Social History     Socioeconomic History    Marital status:    Tobacco Use    Smoking status: Never    Smokeless tobacco: Never   Vaping Use    Vaping status: Never Used   Substance and Sexual Activity    Alcohol use: Yes     Comment: SOCIALLY    Drug use: No         Cancer-related family history includes Testicular cancer in her brother. There is no history of Cancer.     Review of Systems  A comprehensive 14 point review of systems was performed and was negative except as mentioned.    Objective     Vitals:    03/28/25 0932   BP: 99/68   Pulse: 86   Temp: 98.6 °F (37 °C)   TempSrc: Oral   SpO2: 100%   Weight: 55.4 kg  "(122 lb 3.2 oz)   Height: 160 cm (62.99\")   PainSc: 0-No pain                 3/28/2025     9:27 AM   Current Status   ECOG score 0     Physical Exam    GENERAL: Well-developed, well-nourished female in no acute distress.   SKIN: Warm, dry without rashes, purpura or petechiae.   HEAD: Normocephalic.   EYES: Pupils equal, round and reactive to light. EOMs intact. Conjunctivae normal.   EARS: Hearing intact.   NOSE: Septum midline. No excoriations or nasal discharge.   MOUTH: Tongue is well-papillated; no stomatitis or ulcers. Lips normal.   THROAT: Oropharynx with occasional whitish plaque?  Early thrush  NECK: Supple with good range of motion; no thyromegaly or masses, no JVD or bruits.   LYMPHATICS: No cervical, supraclavicular, axillary or inguinal adenopathy.   CHEST: Lungs clear to percussion and auscultation.   CARDIAC: Regular rate and rhythm without murmurs, rubs or gallops.   ABDOMEN: Soft, nontender with no organomegaly or masses.   EXTREMITIES: No clubbing, cyanosis or edema.   NEUROLOGICAL: No focal neurological deficits.   RECENT LABS:  Hematology WBC   Date Value Ref Range Status   03/28/2025 7.83 3.40 - 10.80 10*3/mm3 Final     RBC   Date Value Ref Range Status   03/28/2025 4.97 3.77 - 5.28 10*6/mm3 Final     Hemoglobin   Date Value Ref Range Status   03/28/2025 14.1 12.0 - 15.9 g/dL Final     Hematocrit   Date Value Ref Range Status   03/28/2025 42.2 34.0 - 46.6 % Final     Platelets   Date Value Ref Range Status   03/28/2025 88 (L) 140 - 450 10*3/mm3 Final     Platelets (Citrated Blood)   Date Value Ref Range Status   12/09/2024 68 (L) 140 - 450 10*3/mm3 Final        Assessment & Plan   *ITP  45-year-old female with a history of idiopathic thrombocytopenic purpura during previous pregnancy.  In addition, she has had a degree of pseudothrombocytopenia noted on periodic blood smear and likely a component of gestational thrombocytopenia previously.   She had more recently a relapse of ITP, improved on " steroid dosing. Her symptoms suggested she be tested further for rheumatologic disorder. She was seen by Dr. Gonzáles who diagnosed systemic lupus erythematosus, which evidently includes a component of antiphospholipid antibody syndrome as well.   The patient was placed on Plaquenil and later Imuran, to which she continues to respond.   Her platelet count had been noted to improved somewhat when she was seen in June of this year.    Now which is reviewed in December she continues to do well clinically though her platelet count has dropped modestly with a very minimally elevated IPF.  It was suggested rechecking her anticardiolipin antibodies, beta-2 glycoprotein antibodies and lupus anticoagulant today while she remained off Imuran.  These studies were negative.  The patient presented February 18, 2022 to [facility] for preventive examination at this point having fairly heavy periods-?  Ablation or hysterectomy.  Subsequent CBC including H&H of 13.3 and 41.4 white count of 9200, MCV 82.8, MCH of 26.6 and platelet count of 68,000, normal automated differential.   Unfortunately by late April 2022 her degree of menorrhagia was worsening and she was referred to Dr. Craven who felt she should undergo a laparoscopic hysterectomy, bilateral salpingo-oophorectomy unilateral oophorectomy and possible staging possible laparotomy.  She is now referred back to try to improve her platelet count prior to any surgical procedure.  The patient is seen 5/31/2022.  She has had no issues until recently indicating her platelet count has been between 65 and 80,000.  She was initiated on prednisone 20 mg 3 times daily (40 mg at breakfast, 20 mg in the afternoon).  6/7/2022, platelet count today has significantly improved, now normal at 273,000.  Discussed with Dr. Eastman, patient will be cleared from our standpoint for surgery with Dr. Craven.  Because she responded so well to prednisone, we will reduce dose to 40 mg.  She will return  in 1 week for CBC and MD follow-up to potentially continue to taper prednisone.  Patient seen 6/14/2022 with platelet count of 197,000, prednisone dropped to 30 mg daily, follow-up assessment 6/20/2022 same day as gynecologic oncology.  Patient assessed 6/20/2022 with platelet count of 1 11,000, normal IPF, prednisone 30 mg daily continued  Upon completion of surgery patient came off of steroids but unfortunately experience steroid withdrawal requiring a steroid taper which we put into place at 20 mg followed by 10 mg followed by every other day dosing.  Patient seen 7/13/2022 with acceptable platelet count, prednisone reduced to 5 mg every other day for 4 days then discontinue.  Follow-up assessments at 2 and 4 months planned.  Repeat assessments negative for worsening for thrombocytopenia, stable 11/2/2022 with yearly follow-up planned.  Patient stable 11/2/2023, yearly follow-up planned  Patient seen 12/9/2024, stable clinically though with further thrombocytopenia.  Patient reevaluated 12/30/24 with stable thrombocytopenia.  Plans made to increase Plaquenil to 300 mg p.o. daily, monthly CBC IPF x 2, 3-month follow-up MD  The patient is next seen 3/28/2025 with repeat CBC include H&H of 14.1 and 42.2 with white count 7830 and platelet count now 88,000, IPF reduced to 6.3.  Patient assessed 3/28/2025 with repeat CBC include H&H of 14.1 and 42.2 with white count 7830 and platelet count now 88,000, IPF reduced to 6.3.  Plans made to continue Plaquenil at current dosing.  6-month follow-up reassessment.      *Systemic lupus erythematosus  She was seen by Dr. Gonzáles who diagnosed systemic lupus erythematosus, which evidently includes a component of antiphospholipid antibody syndrome as well.   The patient was placed on Plaquenil and later Imuran, to which she continues to respond.   It was suggested rechecking her anticardiolipin antibodies, beta-2 glycoprotein antibodies and lupus anticoagulant today while she  remained off Imuran.  These studies were negative.  She continues to follow with rheumatology every 6 months while she is on Plaquenil.  She undergoes every 6-month ophthalmologic assessment and lab assessment.  She is now seen 12/9/2024 again with an excellent performance status and no change in her symptoms.  She continues on Plaquenil and unchanged doses.  She does have evidence of increasing thrombocytopenia however with IPF only minimally elevated.  After discussion we plan to reassess her antiphospholipid antibody syndrome studies and have her seen back in short-term interval follow-up.  The patient is reviewed 12/ 30/2024.  Recent studies include additive anticardiolipin antibodies, elevated beta-2 glycoprotein antibody IgG at 83, negative lupus anticoagulant.  Again case discussed with Dr. Gonzáles and plans made to increase her Plaquenil to 300 mg daily.  The patient is agreeable to return monthly for CBC and platelet count being seen back in 3 months.  Assessed 3/20/2025-tolerating increased Plaquenil, plans made to continue Plaquenil at current dosing.       *Menorrhagia  The patient presented February 18, 2022 to [facility] for preventive examination at this point having fairly heavy periods-?  Ablation or hysterectomy.  Subsequent pelvic ultrasound revealed homogenous appearance to endometrium per uterus, endometrial borders well-defined, no intracavitary mass, normal endometrial thickness, normal cervix, right ovary normal, left ovary of 2 cysts 1 at just over 60 mm and possible endometrioma and 1 at just over 30 mm also possible endometrioma.    Unfortunately by late April 2022 her degree of menorrhagia was worsening and she was referred to Dr. Craven who felt she should undergo a laparoscopic hysterectomy, bilateral salpingo-oophorectomy unilateral oophorectomy and possible staging possible laparotomy.  She is now referred back to try to improve her platelet count prior to any surgical  procedure.  Ca1 25 elevated at 90.1 indicating that patient needs to have cysts removed.  Cyst themselves can elevate this marker.  We will work to improve platelet count so patient can have procedure.  Patient will now be cleared for surgery from oncology standpoint, his platelets have returned to normal at 273,000.  Patient subsequently admitted 7/1 through 7/2/2022 undergoing robotic assisted total laparoscopic hysterectomy, BSO and unilateral oophorectomy.  Pathology revealed left ovarian endometrioma, cervix with endometriosis, secretory endometrium, adenomyosis, bilateral fallopian tubes with paratubal cysts, left fallopian tube with endometriosis and uterine serosa with endometriosis.  Stable symptoms 12/30/2024      Jerad Eastman MD  03/28/25

## 2025-03-28 ENCOUNTER — OFFICE VISIT (OUTPATIENT)
Dept: ONCOLOGY | Facility: CLINIC | Age: 45
End: 2025-03-28
Payer: COMMERCIAL

## 2025-03-28 ENCOUNTER — LAB (OUTPATIENT)
Dept: LAB | Facility: HOSPITAL | Age: 45
End: 2025-03-28
Payer: COMMERCIAL

## 2025-03-28 VITALS
HEART RATE: 86 BPM | TEMPERATURE: 98.6 F | HEIGHT: 63 IN | SYSTOLIC BLOOD PRESSURE: 99 MMHG | DIASTOLIC BLOOD PRESSURE: 68 MMHG | BODY MASS INDEX: 21.65 KG/M2 | OXYGEN SATURATION: 100 % | WEIGHT: 122.2 LBS

## 2025-03-28 DIAGNOSIS — D69.3 IDIOPATHIC THROMBOCYTOPENIC PURPURA: ICD-10-CM

## 2025-03-28 DIAGNOSIS — D68.61 ANTIPHOSPHOLIPID ANTIBODY SYNDROME: ICD-10-CM

## 2025-03-28 DIAGNOSIS — D68.61 ANTIPHOSPHOLIPID ANTIBODY SYNDROME: Primary | ICD-10-CM

## 2025-03-28 LAB
BASOPHILS # BLD AUTO: 0.05 10*3/MM3 (ref 0–0.2)
BASOPHILS NFR BLD AUTO: 0.6 % (ref 0–1.5)
DEPRECATED RDW RBC AUTO: 36 FL (ref 37–54)
EOSINOPHIL # BLD AUTO: 0.12 10*3/MM3 (ref 0–0.4)
EOSINOPHIL NFR BLD AUTO: 1.5 % (ref 0.3–6.2)
ERYTHROCYTE [DISTWIDTH] IN BLOOD BY AUTOMATED COUNT: 11.9 % (ref 12.3–15.4)
HCT VFR BLD AUTO: 42.2 % (ref 34–46.6)
HGB BLD-MCNC: 14.1 G/DL (ref 12–15.9)
IMM GRANULOCYTES # BLD AUTO: 0.05 10*3/MM3 (ref 0–0.05)
IMM GRANULOCYTES NFR BLD AUTO: 0.6 % (ref 0–0.5)
LYMPHOCYTES # BLD AUTO: 2.03 10*3/MM3 (ref 0.7–3.1)
LYMPHOCYTES NFR BLD AUTO: 25.9 % (ref 19.6–45.3)
MCH RBC QN AUTO: 28.4 PG (ref 26.6–33)
MCHC RBC AUTO-ENTMCNC: 33.4 G/DL (ref 31.5–35.7)
MCV RBC AUTO: 84.9 FL (ref 79–97)
MONOCYTES # BLD AUTO: 0.66 10*3/MM3 (ref 0.1–0.9)
MONOCYTES NFR BLD AUTO: 8.4 % (ref 5–12)
NEUTROPHILS NFR BLD AUTO: 4.92 10*3/MM3 (ref 1.7–7)
NEUTROPHILS NFR BLD AUTO: 63 % (ref 42.7–76)
NRBC BLD AUTO-RTO: 0 /100 WBC (ref 0–0.2)
PLATELET # BLD AUTO: 88 10*3/MM3 (ref 140–450)
PLATELETS.RETICULATED NFR BLD AUTO: 6.3 % (ref 0.9–6.5)
PMV BLD AUTO: 10.7 FL (ref 6–12)
RBC # BLD AUTO: 4.97 10*6/MM3 (ref 3.77–5.28)
WBC NRBC COR # BLD AUTO: 7.83 10*3/MM3 (ref 3.4–10.8)

## 2025-03-28 PROCEDURE — 85025 COMPLETE CBC W/AUTO DIFF WBC: CPT

## 2025-03-28 PROCEDURE — 36415 COLL VENOUS BLD VENIPUNCTURE: CPT

## 2025-03-28 PROCEDURE — 99213 OFFICE O/P EST LOW 20 MIN: CPT | Performed by: INTERNAL MEDICINE

## 2025-03-28 PROCEDURE — 85055 RETICULATED PLATELET ASSAY: CPT

## 2025-03-28 NOTE — LETTER
March 28, 2025     ALBANIA Rocha  [address] [facility] [address]    Patient: Regi Glass   YOB: 1980   Date of Visit: 3/28/2025     Dear ALBANIA Rocha:       Thank you for referring Regi Glass to me for evaluation. Below are the relevant portions of my assessment and plan of care.    If you have questions, please do not hesitate to call me. I look forward to following Regi along with you.         Sincerely,        Jerad Eastman MD        CC: MD Suly Daniels MD Rachel Jane Chase, MD Kommor, Jerad DUNAWAY MD  03/28/25 1208  Sign when Signing Visit       REASONS FOR FOLLOWUP:    Idiopathic thrombocytopenic purpura.   Variable thrombocytopenia including platelet clumping. ?Gestational thrombocytopenia noted when reviewed 01/16/2012 and 02/14/2012.   Patient seen 01/16/2012 when 16 weeks pregnant.   Patient seen 05/01/2012 nearing term. Platelet count approximately 80,000.   Patient seen 01/24/2014, further thrombocytopenia, now associated with increasing joint pain-polyarticular joint garo n, rheumatologic assessment initiated, consult requested, and prednisone initiated 20 mg oral daily.   Patient status post rheumatologic assessment and results pending, platelet count responsive to oral steroids, reduced; steroid taper planned.   Patient seen on 02/21/2014, platelet count approximately 70,000 on 5 mg every other day per prednisone. Rheumatologic assessment ongoing. SLA(?). Mild increase in antiphospholipid antibody levels.   The patient seen on 06/13/2014, Plaquenil initiated for apparel sy stemic lupus erythematosus associated elevated antiphospholipid antibody, further thrombocytopenia noted in our office and to restart steroids over a 2 week period.   The patient seen 07/14/2014 - improvement per generalized rheumatologic symptoms from cyto penia persisting, Plaquenil continued. Recheck over the subsequent 2 months planned.   Patient  seen 11/10/2014, platelet count approximately 50,000, pending stabilization of rheumatologic symptoms. Reassessment in 4 months planned.   Patient was seen on 03 /23/2015, platelet count between 40,000 and 50,000, again with stabilization of rheumatologic symptoms, reassessment in 6 months' time.   Patient seen 09/16/2015, platelet count of 92,000, improvement of rheumatologic symptoms, every 6 month assessments pl anned.   Patient seen June 01, 2016 stable, platelet count 1 or 16,000 additional rheumatologic symptoms controlled?  Length of Imuran use  Patient reviewed December 5, off Imuran for approximate 6 months, anticardiolipin antibodies, lupus anticoagulant, beta-2 glycoprotein antibodies rechecked  Patient reviewed May 31, 2017 clinically stable, platelet count acceptable, yearly follow-up planned  Patient reassessed 5/31/22 with bilateral ovarian cysts, plans for surgical intervention per gynecologic oncology, reassessment per antiphospholipid antibody syndrome, steroids initiated with prednisone 1 mg/kg  Patient seen in follow-up 6/14/2022 responding to p.o. steroids, adjusted as needed.  Patient seen 6/20/2022, acceptable platelet count, surgery planned 7/1/2022  Patient status post laparoscopic hysterectomy July 2022 with benign findings  Reassessment 11/2/2022-repeat CBC at acceptable levels.  Again review of pathology with benign findings-endometriosis  Patient seen 11/2/2023 stable for SLE and ITP.  Assessment 12/9/2024 with increasing thrombocytopenia?,  Repeat citrated tube assessment as well as antibody studies-DONNA, beta-2 glycoprotein and lupus anticoagulant.  Subsequent discussion with rheumatology, Plaquenil moved to 300 mg daily  Stabilization of thrombocytopenia noted 3/20/2025.  She is tolerating increased baclofen without side effect           The patient was seen 12/9/2024 with ongoing excellent performance status over the previous year.  We have discussed that she has no increase in her  rheumatologic symptoms including assessment in June of this year continuing her current Plaquenil dosing.       Her previous history included the patient being admitted 7/1 through 7/2/2022 undergoing robotic assisted total laparoscopic hysterectomy, BSO and unilateral oophorectomy.  Pathology revealed left ovarian endometrioma, cervix with endometriosis, secretory endometrium, adenomyosis, bilateral fallopian tubes with paratubal cysts, left fallopian tube with endometriosis and uterine serosa with endometriosis.  Unfortunately she did develop steroid withdrawal symptoms and needed to taper with an additional treatment plan which was put into place and which was effective.  She seen back in office 7/13/2022 feeling considerably better we plan further steroid taper.    A 1 year follow-up was requested and she  was next seen 11/2/2023.  She is feeling well without any symptoms related to her SLE and her degree of thrombocytopenia is generally improved with acceptable IPF..    She is now seen 12/9/2024 again with an excellent performance status and no change in her symptoms.  She continues on Plaquenil and unchanged doses.  She does have evidence of increasing thrombocytopenia however with IPF only minimally elevated.  After discussion we plan to reassess her antiphospholipid antibody syndrome studies and have her seen back in short-term interval follow-up.    The patient is reviewed 12/ 30/2024.  Recent studies include additive anticardiolipin antibodies, elevated beta-2 glycoprotein antibody IgG at 83, negative lupus anticoagulant.  We have discussed that often lower platelet count findings do not need to be treated.  We want to be certain there is no additional issues such as worsening ITP which does not appear to be the case when she is tested now at 12/30/2024 with platelet count of  68,000, IPF of 7.6.  She is feeling well without any additional symptoms referable to her rheumatologic disease.  We have called  her rheumatologist-Dr. Gonzáles-and discussed her dosing for Plaquenil deciding to move to 300 mg p.o. daily.    The patient is next seen 3/28/2025 with repeat CBC include H&H of 14.1 and 42.2 with white count 7830 and platelet count now 88,000, IPF reduced to 6.3.  Again she is tolerating Plaquenil without additional side effects plan to continue Plaquenil at current dosing        HEMATOLOGY HISTORY:  The patient is now a 45-year-old female with a history of ITP and variable thrombocytopenia also associated with platelet clumping and gestational thrombocytopenia. The patient was seen during her pregnancy 2012,  and again 2012 at approximately 27 weeks, platelet count 70,000-80,000. She was admitted to [facility] with a platelet count of 1000 along with red purpura and petechiae. She has a history of thrombocytopenia dating to  when she was pregnant with a platelet count in the 50,000 range. She was also treated throughout her pregnancy with Lovenox at prophylactic doses due to prior miscarriages. She tolerated this well with no bleeding complications, platelet count appar ently spontaneously improved to the 80,000 range, eventually was 95,000 at the time of .   Approximately 10 years prior to hospitalization here at [facility], she had an area of folliculitis in the left outer aspect of labia requi ring incision and drainage treated empirically with antibiotics with Bactrim and doxycycline which she received over 10 days.   On the morning of admission she noted development of diffuse erythematous rash on her abdomen and back as well as lower extremities and buccal mucosa. She presented to Dr. Lee in [place] and was found to have a white count of 5000, hemoglobin 14.2 a n d platelet count of 1000. She was transferred to [facility] for admission and treatment for presumed ITP. Admitting studies include a CRP of .6, ANTONI 1:80  positive, , IPF 31.8, initial CBC with platelet count of 2000, H and H 13.9 , 40.7, WBC 4140. She was placed on Decadron orally and IVIG. Sedimentation rate was found to be 8 and on the 24th IPF was still 31.7. AFO screening was also positive incidentally and CMV antibody IgG was greater than 13.2, IgM .28, EBV antibodies were a lso considerably high, EBV antibody/nuclear antigen greater than 600 and EBV antibodies ECA IgG of 450 with an EBV antibody early IgG of 17. All of these are excessively high. The patient did fortunately respond however, by the 26th platelet count was u p to 84,000, IPF 6.7. It was elected at this point for the patient to be discharged home and followed back in the office with weekly counts and tapered steroids thereafter.   She had her counts done each week including 12/13/2010 at which time she was 201 ,000 and when seen on 12/20/2010, platelet count was 136,000. In reviewing her circumstances on that day, she went up to 30 mg of steroids and had her cut back very slowly on a weekly basis.   She returned on 01/17/2011, feeling well. Her IPF is also at normal levels today. Additional studies have been done to be certain that she does have additional hypercoagulable state as well including lupus anticoagulant including lipid antibody screening. She has had both Pneumovax and meningococcal vaccine but no Haemophilus.   The patient presented back to the office 02/14/2011 after tapering off steroids and has been off of them for 1 week. Fortunately she remained relatively stable with platelet count 116,000 and we elected to follow her on an every other we ek basis. Fortunately her subsequent counts have been stable. She has been able to complete her vaccinations as necessary. Her subsequent testing here has shown platelets in the 205,000 range on 03/28/2011, 168,000 on 04/11/2011 and 148 on 04/18/2011. She continues to feel well otherwise and has agreed that she would have monthly checks  for a period of time longer for at least a 6 month period from her hospitalization.   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 She may have had a viral illness as of late. Her counts checked 05/17/2011 with a platelet count of 198,000, 06/13/2011 of 138,000. As she returns it i s clear that her platelet count is actually lower into the near 90,000 range. We discussed this in part as possibly not necessarily related to ICP since her IPF is actually quite low and her smears do not show enlarged platelets.   As a result of the ab ove the patient was asked to have counts done closer to home and these were done at every two week intervals with a considerable variation seen. This includes on 08/03 of 146,000, 08/10 of 168,000, 08/17 of 113,000, 8/24 of 96,000 and today 106,000 here in the office. As a result of these findings in its variability thereof suggests that perhaps the patient has platelet clumping ongoing, and today we will test her for this as well.   The patient thereafter continued her usual lifestyle. She and her [facility] nd had made plans for her to try to become pregnant and she did quite quickly do so. Thereafter she now sees Dr. Vic Lock, high-risk obstetrics, and her platelet count checked there approximately every other week has been dropping. She is now seen back in our office and actually has a platelet count of 56,000 with an IPF of 7. A peripheral smear is currently pending.   As result of review 01/16/2012 the patient had multifactorial reasons for her thrombocytopenia including ITP, likely additional pl atelet clumping, and? gestational thrombocytopenia. In any case, she was reasonably stable at that point and we elected to follow her counts every other week. She returns back today having done this. She was also placed on aspirin by her high-risk OB  a n d she is having increasing gum bleeding likely as a result of several reasons now. Her most recent platelet counts including 84,000 on 2012 and 68,000 when seen today, 2012. IPF interestingly is 4.7. Her pregnancy has otherwise gone well wi th no additional issues thus far. She is approximately 20 weeks' gestation.   The patient was asked to be checked at her family physicians office. She also followed up with her high risk OB and has platelet count ranging from 60,000 to as much as 80,000. Today when seen her platelet count is 79,000, IPF of 6.5. I discussed with Dr. Tomi Lock her high risk OB and plans were to try to hold off steroids unless she is below 50,000 wherein she would also discontinue aspirin. When seen today the pregn leanne has progressed nicely without any complication thus far. It is not clear if her delivery date is to be scheduled, but one expects that it might well be.   The patient's case was discussed with Dr. Tomi Lock with plans for her to again hold off steroids until she is below 50,000 at which time she will discontinue aspirin.   When seen again on 2012, pregnancy has progressed nicely and she is to see Dr. Tomi Lock in approximately 2 weeks. It is likely that she will undergo .   The patient did proceed onto steroids just prior to her pregnancy and fortunately did quite well peripartum. The patient had not been seen since that time approximately a year when she is now seen back 2013. She had presented to her PCP' s office jus t recently with abdominal pain, slowly worsening without nausea, vomiting or change in bowel habits. She underwent CT scan of the abdomen, pelvis without abnormalities noted though her blood count was somewhat suspicious with an elevated white count to 18 , 000 and platelet count down to 33,000. Normal hemoglobin and hematocrit 14.3 and 41.97. Patient rechecked two days later with platelet count of 43,000 and white  count apparently normal? She now presents back to our practice for reassessment feeling much b tereza per her abdominal pain with a normal performance status, otherwise no change in bowel function.   The patient thereafter was asked to be seen back and now presents 01/24/2014. She has been noticing in the last several weeks to months, pain in multiple joints including right hip, left shoulder and the bilateral knees. She notes a general stiffness throughout multiple joints in the a.m. as well. This is a new finding for her and she is quite troubled by it. She was undergoing assessment for it recently with additional laboratory studies done including 10/10/2013 with a platelet count found to be reduced to 86,000.   Additional laboratory results done also in [place] in June 2013 include normal serum chemistries; platelet count now is 53,000, WBC 8700, he moglobin and hematocrit 14.6, and 43.6. ANTONI titer positive, RA rheumatoid factor of 7, ASO of 137, uric acid 3.7. The patient was thereafter referred back to our practice for her thrombocytopenia. She feels well today except for again a degree of joint d iscomfort in the distribution as described above.   The patient as a result of the above and suspicions for rheumatologic disease was asked to undergo a series of studies. This included an ANTONI comprehensive panel that revealed anti-double-stranded DNA elevated at 21. Additionally, her RA panel suggested e a rly rheumatoid arthritis with a positive anti-CCP. As the patient's studies with Dr. Gonzáles however were not yet completed at the time she is seen back in our office. The patient at this point had continued steroids though when seen back in the office 01/3 1 /2014 her platelet count was 199,000. She was asked to taper prednisone down to 10 mg daily and when seen 02/07/2014 her platelet count is 89,000. The patient feels well overall today when seen. The joint discomfort has improved remarkably on the steroi d doses.    Patient seen on 02/21/2014 platelet count approximately 70,000 on 5 mg every other day per prednisone. Rheumatologic assessment ongoing mild increase in antiphospholipid antibody level.   The patient, after last being seen, was asked to drop predn isone down to 5 mg daily and then 5 mg every other day. She is now seeing Dr. Minoo Gonzáles who is assessing her as well and we sent additional laboratory studies, demonstrating mild elevation in antiphospholipid antibodies as well as glycoprotein antibody . It was also noted that her TTP level was up to 63. Ms. Glass indicates that she is being considered for potentially lupus as an underlying diagnosis. Dr. Gonzáles would like to check her off steroids and thus we discussed tapering off further at this po int with platelet count is now reasonably acceptable.   The patient thereafter came off steroids and has been doing relatively well. As she now however, presents back it is recognized that her platelet count has been slowly dropping. This includes the 05/27 /2014 assessment with a platelet count of 30,000 with assessment on 06/11/2014 at 25,000 and today when seen on 06/13/2014 the platelet count is 20,000 with IPF 14.5. The patient states she is having increasing joint pain but has had no evidence of ecchym oses or petechial lesions and no additional gingival bleeding, epistaxis or vaginal bleeding. She in fact feels reasonably good except for the joint pain that she feels is manageable. She is, however, concerned about her platelet count and rightly so.   The patient thereafter was started on Plaquenil through Rheumatology and over the next several weeks, she had felt somewhat better. In fact when she is seen back today, 07/14/2014, she no longer has joint discomfort. She does have thrombocytopenia, however, a t approximately the same levels as she did previously. She had been on steroids in the last several weeks as they were tapered off. This was revealed when her  platelet count was 23,000 three weeks ago and is recognized today. Today, however, her platelet count is 28,000. She has had no additional bleeding issues however, since last seen.   The patient, after being reviewed in July 2014, was asked to followup with plans for her to remain on Plaquenil, initiate Imuran which began in August. She stayed on th e medication, fortunately tolerating it well and as she was seen back today, 11/17/2014, overall feels as if she is having fewer rheumatologic symptoms. Unfortunately, hematologically, she remains [id number] with her platelet count at close to 60, 000.   The patient thereafter has followed up with rheumatology on an every 2 month basis and now returned back here 4 months from previous on 03/23/2015. Her symptoms for her rheumatologic disorder remains under control though her Imuran has been moved to 150 mg a day as well as her Plaquenil, maintained at the same dose. She feels reasonably good overall today without any new symptoms, but concerned about her platelet count that was close to 40,000 when reviewed today. The patient has had no bleeding in g ums, epistaxis, GI or  tract.   The patient is seen back in followup and indicates that her rheumatologic symptoms remain under control with Imuran and Plaquenil. We find wonderfully enough that her platelet count is also improved to approximately 100,000 when seen in the office today as well.   Patient is now next reviewed June 01, 2016.  She is feeling well overall without additional rheumatologic symptoms.  She has been on Imuran for approximately 2 years and is a question of how long she'll need to stay on it.  She fortunately is hematologically stable when reviewed today platelet count of 116,000, and otherwise normal CBC     The patient is now reviewed December 50,016.  As a result of running out of her Imuran not having it refilled by a rheumatologist she has remained off Imuran.  She, fortunately, has not  had any additional symptoms including rash development and/or joint discomfort.  We discussed rechecking her anticardiolipin antibodies, beta-2 glycoprotein antibodies and lupus anticoagulant at this point since she has a further degree of thrombocytopenia also present today.    The patient's subsequent laboratory studies were otherwise negative and she is now seen back May 31, 2017.  She continues to feel well without any joint disturbance but she does note bilateral malar rash which she tends to cover with makeup.    The patient presented February 18, 2022 to [facility] for preventive examination at this point having fairly heavy periods-?  Ablation or hysterectomy.Subsequent CBC including H&H of 13.3 and 41.4 white count of 9200, MCV 82.8, MCH of 26.6 and platelet count of 68,000, normal automated differential. Subsequent pelvic ultrasound revealed homogenous appearance to endometrium per uterus, endometrial borders well-defined, no intracavitary mass, normal endometrial thickness, normal cervix, right ovary normal, left ovary of 2 cysts 1 at just over 60 mm and possible endometrioma and 1 at just over 30 mm also possible endometrioma.  Unfortunately by late April 2022 her degree of menorrhagia was worsening and she was referred to Dr. Craven who felt she should undergo a laparoscopic hysterectomy, bilateral salpingo-oophorectomy unilateral oophorectomy and possible staging possible laparotomy.  She is now referred back to try to improve her platelet count prior to any surgical procedure.     The patient is seen 5/31/2022 indicating that she has been routinely assessed by rheumatology at every 6 months while she is remained on Plaquenil undergoing laboratory studies and ophthalmologic assessments every 6 months.  She has had no issues until recently indicating her platelet count has been between 65 and 80,000.  She understands the above issues and the need to try to bring her platelet count up prior to  surgical intervention by Dr. Craven.  She has had no symptoms, however, of progressive rheumatologic disease including lack of Raynaud's, further malar rash, joint discomfort, shortness of breath, cough, abdominal pain, change in bowel habit, joint swelling or neuropathy.  Additionally she has had no pelvic discomfort.    Patient responded quickly to p.o. steroids at 1 mg/kg with repeat CBC 6/7/2022 and platelet count of 273,000, IPF now reduced to 3.6, previous  level 90.1.  Patient was dropped to 40 mg daily next seen 6/14/2022 with platelet count 197,000, prednisone dropped to 30 mg daily and plans to reassess 6/20/2022.    The patient seen 6/20/2022 feeling well, platelet count at 111,000, IPF at 4.0.  Her surgery is now scheduled 7/1/2022 we plan to continue her current dosing check and her platelet count preoperatively 6/29/2022.    Patient's assessment 6/29 included a platelet count of 1 13,000 she was admitted 7/1 through 7/2/2022 undergoing robotic assisted total laparoscopic hysterectomy, BSO and unilateral oophorectomy.  Pathology revealed left ovarian endometrioma, cervix with endometriosis, secretory endometrium, adenomyosis, bilateral fallopian tubes with paratubal cysts, left fallopian tube with endometriosis and uterine serosa with endometriosis.    She is next seen back 7/13/2022.  She continues to recover postoperatively and we discussed a further steroid taper.    Patient assessed at 2-month intervals formally evaluate 11/2/2022 with essentially stable findings per her degree of thrombocytopenia.  Yearly follow-up planned.    Patient is next seen 11/2/2023 stable with acceptable platelet count and IPF level.    Patient is next seen 12/9/2024 with IPF 8.0, CBC with H&H 13.7 and 40.3, white count 8160 and platelet count of 70,000.    She is now  again with an excellent performance status and no change in her symptoms.  She continues on Plaquenil and unchanged doses.  She does have evidence of  increasing thrombocytopenia however with IPF only minimally elevated.  After discussion we plan to reassess her antiphospholipid antibody syndrome studies and have her seen back in short-term interval follow-up.    The patient is reviewed 12/30/2024.  Recent studies include additive anticardiolipin antibodies, elevated beta-2 glycoprotein antibody IgG at 83, negative lupus anticoagulant  Case discussed with Dr. Gonzáles and plans made to increase her Plaquenil 300 mg p.o. daily there being evidence of activation of her lupus with the above findings.    The patient is next seen 3/28/2025 with repeat CBC include H&H of 14.1 and 42.2 with white count 7830 and platelet count now 88,000, IPF reduced to 6.3.  Plans made to continue Plaquenil at current dosing.       Past Medical History:   Diagnosis Date   • Cytopenia    • Endometriosis 2006   • Gestational thrombocytopenia    • Hx of folliculitis     IN THE LEFT OUTER ASPECT OF LABIA   • ITP (idiopathic thrombocytopenic purpura)     INCLUDING PLATELET CLUMPING   • Joint pain    • Left ovarian cyst    • Lupus    • Seasonal allergies    • Thrombocytopenia      ONCOLOGIC HISTORY:  (History from previous dates can be found in the separate document.)    Current Outpatient Medications on File Prior to Visit   Medication Sig Dispense Refill   • cetirizine (zyrTEC) 10 MG tablet Take  by mouth Daily.     • hydroxychloroquine (PLAQUENIL) 200 MG tablet Take 1 tablet by mouth Every Night.     • multivitamin with minerals tablet tablet Take 1 tablet by mouth Daily. HOLDING FOR DOS     • vitamin D3 (Vitamin D) 125 MCG (5000 UT) capsule capsule Take 1 capsule by mouth Daily.     • predniSONE (DELTASONE) 20 MG tablet 20mg daily x 2 days, 10mg daily x 3 days, 10mg every other day x 4 days, then off 15 tablet 0     No current facility-administered medications on file prior to visit.     ALLERGIES:     Allergies   Allergen Reactions   • Bactrim [Sulfamethoxazole-Trimethoprim] Rash  "      Social History     Socioeconomic History   • Marital status:    Tobacco Use   • Smoking status: Never   • Smokeless tobacco: Never   Vaping Use   • Vaping status: Never Used   Substance and Sexual Activity   • Alcohol use: Yes     Comment: SOCIALLY   • Drug use: No         Cancer-related family history includes Testicular cancer in her brother. There is no history of Cancer.     Review of Systems  A comprehensive 14 point review of systems was performed and was negative except as mentioned.    Objective    Vitals:    03/28/25 0932   BP: 99/68   Pulse: 86   Temp: 98.6 °F (37 °C)   TempSrc: Oral   SpO2: 100%   Weight: 55.4 kg (122 lb 3.2 oz)   Height: 160 cm (62.99\")   PainSc: 0-No pain                 3/28/2025     9:27 AM   Current Status   ECOG score 0     Physical Exam    GENERAL: Well-developed, well-nourished female in no acute distress.   SKIN: Warm, dry without rashes, purpura or petechiae.   HEAD: Normocephalic.   EYES: Pupils equal, round and reactive to light. EOMs intact. Conjunctivae normal.   EARS: Hearing intact.   NOSE: Septum midline. No excoriations or nasal discharge.   MOUTH: Tongue is well-papillated; no stomatitis or ulcers. Lips normal.   THROAT: Oropharynx with occasional whitish plaque?  Early thrush  NECK: Supple with good range of motion; no thyromegaly or masses, no JVD or bruits.   LYMPHATICS: No cervical, supraclavicular, axillary or inguinal adenopathy.   CHEST: Lungs clear to percussion and auscultation.   CARDIAC: Regular rate and rhythm without murmurs, rubs or gallops.   ABDOMEN: Soft, nontender with no organomegaly or masses.   EXTREMITIES: No clubbing, cyanosis or edema.   NEUROLOGICAL: No focal neurological deficits.   RECENT LABS:  Hematology WBC   Date Value Ref Range Status   03/28/2025 7.83 3.40 - 10.80 10*3/mm3 Final     RBC   Date Value Ref Range Status   03/28/2025 4.97 3.77 - 5.28 10*6/mm3 Final     Hemoglobin   Date Value Ref Range Status   03/28/2025 14.1 " 12.0 - 15.9 g/dL Final     Hematocrit   Date Value Ref Range Status   03/28/2025 42.2 34.0 - 46.6 % Final     Platelets   Date Value Ref Range Status   03/28/2025 88 (L) 140 - 450 10*3/mm3 Final     Platelets (Citrated Blood)   Date Value Ref Range Status   12/09/2024 68 (L) 140 - 450 10*3/mm3 Final        Assessment & Plan  *ITP  45-year-old female with a history of idiopathic thrombocytopenic purpura during previous pregnancy.  In addition, she has had a degree of pseudothrombocytopenia noted on periodic blood smear and likely a component of gestational thrombocytopenia previously.   She had more recently a relapse of ITP, improved on steroid dosing. Her symptoms suggested she be tested further for rheumatologic disorder. She was seen by Dr. Gonzáles who diagnosed systemic lupus erythematosus, which evidently includes a component of antiphospholipid antibody syndrome as well.   The patient was placed on Plaquenil and later Imuran, to which she continues to respond.   Her platelet count had been noted to improved somewhat when she was seen in June of this year.    Now which is reviewed in December she continues to do well clinically though her platelet count has dropped modestly with a very minimally elevated IPF.  It was suggested rechecking her anticardiolipin antibodies, beta-2 glycoprotein antibodies and lupus anticoagulant today while she remained off Imuran.  These studies were negative.  The patient presented February 18, 2022 to [facility] for preventive examination at this point having fairly heavy periods-?  Ablation or hysterectomy.  Subsequent CBC including H&H of 13.3 and 41.4 white count of 9200, MCV 82.8, MCH of 26.6 and platelet count of 68,000, normal automated differential.   Unfortunately by late April 2022 her degree of menorrhagia was worsening and she was referred to Dr. Craven who felt she should undergo a laparoscopic hysterectomy, bilateral salpingo-oophorectomy unilateral oophorectomy and  possible staging possible laparotomy.  She is now referred back to try to improve her platelet count prior to any surgical procedure.  The patient is seen 5/31/2022.  She has had no issues until recently indicating her platelet count has been between 65 and 80,000.  She was initiated on prednisone 20 mg 3 times daily (40 mg at breakfast, 20 mg in the afternoon).  6/7/2022, platelet count today has significantly improved, now normal at 273,000.  Discussed with Dr. Eastman, patient will be cleared from our standpoint for surgery with Dr. Craven.  Because she responded so well to prednisone, we will reduce dose to 40 mg.  She will return in 1 week for CBC and MD follow-up to potentially continue to taper prednisone.  Patient seen 6/14/2022 with platelet count of 197,000, prednisone dropped to 30 mg daily, follow-up assessment 6/20/2022 same day as gynecologic oncology.  Patient assessed 6/20/2022 with platelet count of 1 11,000, normal IPF, prednisone 30 mg daily continued  Upon completion of surgery patient came off of steroids but unfortunately experience steroid withdrawal requiring a steroid taper which we put into place at 20 mg followed by 10 mg followed by every other day dosing.  Patient seen 7/13/2022 with acceptable platelet count, prednisone reduced to 5 mg every other day for 4 days then discontinue.  Follow-up assessments at 2 and 4 months planned.  Repeat assessments negative for worsening for thrombocytopenia, stable 11/2/2022 with yearly follow-up planned.  Patient stable 11/2/2023, yearly follow-up planned  Patient seen 12/9/2024, stable clinically though with further thrombocytopenia.  Patient reevaluated 12/30/24 with stable thrombocytopenia.  Plans made to increase Plaquenil to 300 mg p.o. daily, monthly CBC IPF x 2, 3-month follow-up MD  The patient is next seen 3/28/2025 with repeat CBC include H&H of 14.1 and 42.2 with white count 7830 and platelet count now 88,000, IPF reduced to 6.3.  Patient  assessed 3/28/2025 with repeat CBC include H&H of 14.1 and 42.2 with white count 7830 and platelet count now 88,000, IPF reduced to 6.3.  Plans made to continue Plaquenil at current dosing.  6-month follow-up reassessment.      *Systemic lupus erythematosus  She was seen by Dr. Gonzáles who diagnosed systemic lupus erythematosus, which evidently includes a component of antiphospholipid antibody syndrome as well.   The patient was placed on Plaquenil and later Imuran, to which she continues to respond.   It was suggested rechecking her anticardiolipin antibodies, beta-2 glycoprotein antibodies and lupus anticoagulant today while she remained off Imuran.  These studies were negative.  She continues to follow with rheumatology every 6 months while she is on Plaquenil.  She undergoes every 6-month ophthalmologic assessment and lab assessment.  She is now seen 12/9/2024 again with an excellent performance status and no change in her symptoms.  She continues on Plaquenil and unchanged doses.  She does have evidence of increasing thrombocytopenia however with IPF only minimally elevated.  After discussion we plan to reassess her antiphospholipid antibody syndrome studies and have her seen back in short-term interval follow-up.  The patient is reviewed 12/ 30/2024.  Recent studies include additive anticardiolipin antibodies, elevated beta-2 glycoprotein antibody IgG at 83, negative lupus anticoagulant.  Again case discussed with Dr. Gonzáles and plans made to increase her Plaquenil to 300 mg daily.  The patient is agreeable to return monthly for CBC and platelet count being seen back in 3 months.  Assessed 3/20/2025-tolerating increased Plaquenil, plans made to continue Plaquenil at current dosing.       *Menorrhagia  The patient presented February 18, 2022 to [facility] for preventive examination at this point having fairly heavy periods-?  Ablation or hysterectomy.  Subsequent pelvic ultrasound revealed homogenous appearance  to endometrium per uterus, endometrial borders well-defined, no intracavitary mass, normal endometrial thickness, normal cervix, right ovary normal, left ovary of 2 cysts 1 at just over 60 mm and possible endometrioma and 1 at just over 30 mm also possible endometrioma.    Unfortunately by late April 2022 her degree of menorrhagia was worsening and she was referred to Dr. Craven who felt she should undergo a laparoscopic hysterectomy, bilateral salpingo-oophorectomy unilateral oophorectomy and possible staging possible laparotomy.  She is now referred back to try to improve her platelet count prior to any surgical procedure.  Ca1 25 elevated at 90.1 indicating that patient needs to have cysts removed.  Cyst themselves can elevate this marker.  We will work to improve platelet count so patient can have procedure.  Patient will now be cleared for surgery from oncology standpoint, his platelets have returned to normal at 273,000.  Patient subsequently admitted 7/1 through 7/2/2022 undergoing robotic assisted total laparoscopic hysterectomy, BSO and unilateral oophorectomy.  Pathology revealed left ovarian endometrioma, cervix with endometriosis, secretory endometrium, adenomyosis, bilateral fallopian tubes with paratubal cysts, left fallopian tube with endometriosis and uterine serosa with endometriosis.  Stable symptoms 12/30/2024      Jerad Eastman MD  03/28/25

## (undated) DEVICE — DRAPE,UNDERBUTTOCKS,PCH,STERILE: Brand: MEDLINE

## (undated) DEVICE — BLADELESS OBTURATOR: Brand: WECK VISTA

## (undated) DEVICE — CANNULA SEAL

## (undated) DEVICE — SUT PDS 0 CT1 36IN Z346H

## (undated) DEVICE — 3M™ STERI-STRIP™ REINFORCED ADHESIVE SKIN CLOSURES, R1547, 1/2 IN X 4 IN (12 MM X 100 MM), 6 STRIPS/ENVELOPE: Brand: 3M™ STERI-STRIP™

## (undated) DEVICE — DRAPE,CHEST,FENES,15X10,STERIL: Brand: MEDLINE

## (undated) DEVICE — PATIENT RETURN ELECTRODE, SINGLE-USE, CONTACT QUALITY MONITORING, ADULT, WITH 9FT CORD, FOR PATIENTS WEIGING OVER 33LBS. (15KG): Brand: MEGADYNE

## (undated) DEVICE — TIP COVER ACCESSORY

## (undated) DEVICE — LAPAROSCOPIC SMOKE FILTRATION SYSTEM: Brand: PALL LAPAROSHIELD® PLUS LAPAROSCOPIC SMOKE FILTRATION SYSTEM

## (undated) DEVICE — SOL NACL 0.9PCT 1000ML

## (undated) DEVICE — SOL ANTISTICK CAUTRY ELECTROLUBE LF

## (undated) DEVICE — SKIN PREP TRAY W/CHG: Brand: MEDLINE INDUSTRIES, INC.

## (undated) DEVICE — TOWEL,OR,DSP,ST,BLUE,STD,4/PK,20PK/CS: Brand: MEDLINE

## (undated) DEVICE — TOTAL TRAY, 16FR 10ML SIL FOLEY, URN: Brand: MEDLINE

## (undated) DEVICE — VCARE MEDIUM, UTERINE MANIPULATOR, VAGINAL-CERVICAL-AHLUWALIA'S-RETRACTOR-ELEVATOR: Brand: VCARE

## (undated) DEVICE — SYR LL TP 10ML STRL

## (undated) DEVICE — GLV SURG BIOGEL LTX PF 6 1/2

## (undated) DEVICE — ARM DRAPE

## (undated) DEVICE — LOU LITHOTOMY ROBOTIC: Brand: MEDLINE INDUSTRIES, INC.

## (undated) DEVICE — GLV SURG BIOGEL LTX PF 5 1/2

## (undated) DEVICE — SUT VIC 0/0 UR6 27IN DYED J603H

## (undated) DEVICE — ENDOPATH XCEL BLADELESS TROCARS WITH STABILITY SLEEVES: Brand: ENDOPATH XCEL

## (undated) DEVICE — DRSNG WND BORDR/ADHS NONADHR/GZ LF 2X2IN STRL

## (undated) DEVICE — GOWN,SIRUS,NON REINFRCD,LARGE,SET IN SL: Brand: MEDLINE

## (undated) DEVICE — FLEX TIP DUPLO SPRAYER

## (undated) DEVICE — VISUALIZATION SYSTEM: Brand: CLEARIFY

## (undated) DEVICE — GLV SURG SENSICARE PI PF LF 7 GRN STRL

## (undated) DEVICE — ANTIBACTERIAL UNDYED BRAIDED (POLYGLACTIN 910), SYNTHETIC ABSORBABLE SUTURE: Brand: COATED VICRYL

## (undated) DEVICE — COLUMN DRAPE